# Patient Record
Sex: FEMALE | Race: WHITE | NOT HISPANIC OR LATINO
[De-identification: names, ages, dates, MRNs, and addresses within clinical notes are randomized per-mention and may not be internally consistent; named-entity substitution may affect disease eponyms.]

---

## 2017-01-05 ENCOUNTER — APPOINTMENT (OUTPATIENT)
Dept: UROGYNECOLOGY | Facility: CLINIC | Age: 82
End: 2017-01-05

## 2017-01-07 ENCOUNTER — RX RENEWAL (OUTPATIENT)
Age: 82
End: 2017-01-07

## 2017-01-10 ENCOUNTER — MEDICATION RENEWAL (OUTPATIENT)
Age: 82
End: 2017-01-10

## 2017-03-01 ENCOUNTER — APPOINTMENT (OUTPATIENT)
Dept: CARDIOLOGY | Facility: CLINIC | Age: 82
End: 2017-03-01

## 2017-03-01 ENCOUNTER — RX RENEWAL (OUTPATIENT)
Age: 82
End: 2017-03-01

## 2017-03-01 ENCOUNTER — APPOINTMENT (OUTPATIENT)
Dept: INTERNAL MEDICINE | Facility: CLINIC | Age: 82
End: 2017-03-01

## 2017-03-01 ENCOUNTER — NON-APPOINTMENT (OUTPATIENT)
Age: 82
End: 2017-03-01

## 2017-03-01 VITALS
SYSTOLIC BLOOD PRESSURE: 148 MMHG | BODY MASS INDEX: 24.6 KG/M2 | DIASTOLIC BLOOD PRESSURE: 78 MMHG | HEIGHT: 59 IN | OXYGEN SATURATION: 96 % | WEIGHT: 122 LBS | HEART RATE: 75 BPM | TEMPERATURE: 97.9 F | RESPIRATION RATE: 12 BRPM

## 2017-03-01 RX ORDER — PNEUMOCOCCAL 23-VAL P-SAC VAC 25MCG/0.5
25 VIAL (ML) INJECTION
Qty: 1 | Refills: 0 | Status: ACTIVE | COMMUNITY
Start: 2016-10-25

## 2017-03-28 ENCOUNTER — MEDICATION RENEWAL (OUTPATIENT)
Age: 82
End: 2017-03-28

## 2017-03-28 ENCOUNTER — RX RENEWAL (OUTPATIENT)
Age: 82
End: 2017-03-28

## 2017-04-03 ENCOUNTER — APPOINTMENT (OUTPATIENT)
Dept: UROGYNECOLOGY | Facility: CLINIC | Age: 82
End: 2017-04-03

## 2017-04-10 ENCOUNTER — RX RENEWAL (OUTPATIENT)
Age: 82
End: 2017-04-10

## 2017-04-27 ENCOUNTER — RX RENEWAL (OUTPATIENT)
Age: 82
End: 2017-04-27

## 2017-06-08 ENCOUNTER — RX RENEWAL (OUTPATIENT)
Age: 82
End: 2017-06-08

## 2017-06-12 ENCOUNTER — APPOINTMENT (OUTPATIENT)
Dept: UROLOGY | Facility: CLINIC | Age: 82
End: 2017-06-12

## 2017-06-12 ENCOUNTER — LABORATORY RESULT (OUTPATIENT)
Age: 82
End: 2017-06-12

## 2017-06-12 ENCOUNTER — APPOINTMENT (OUTPATIENT)
Dept: PULMONOLOGY | Facility: CLINIC | Age: 82
End: 2017-06-12

## 2017-06-12 VITALS
WEIGHT: 122 LBS | DIASTOLIC BLOOD PRESSURE: 77 MMHG | SYSTOLIC BLOOD PRESSURE: 148 MMHG | RESPIRATION RATE: 12 BRPM | OXYGEN SATURATION: 98 % | HEART RATE: 62 BPM | HEIGHT: 59 IN | BODY MASS INDEX: 24.6 KG/M2

## 2017-06-12 DIAGNOSIS — H35.30 UNSPECIFIED MACULAR DEGENERATION: ICD-10-CM

## 2017-06-12 DIAGNOSIS — H02.401 UNSPECIFIED PTOSIS OF RIGHT EYELID: ICD-10-CM

## 2017-06-13 LAB
ALBUMIN SERPL ELPH-MCNC: 4.1 G/DL
ALP BLD-CCNC: 48 U/L
ALT SERPL-CCNC: 17 U/L
ANION GAP SERPL CALC-SCNC: 18 MMOL/L
AST SERPL-CCNC: 29 U/L
BASOPHILS # BLD AUTO: 0.05 K/UL
BASOPHILS NFR BLD AUTO: 0.7 %
BILIRUB SERPL-MCNC: 0.4 MG/DL
BUN SERPL-MCNC: 14 MG/DL
CALCIUM SERPL-MCNC: 9.4 MG/DL
CHLORIDE SERPL-SCNC: 102 MMOL/L
CHOLEST SERPL-MCNC: 164 MG/DL
CHOLEST/HDLC SERPL: 3.4 RATIO
CO2 SERPL-SCNC: 22 MMOL/L
CREAT SERPL-MCNC: 0.72 MG/DL
CRP SERPL-MCNC: <0.2 MG/DL
EOSINOPHIL # BLD AUTO: 0.15 K/UL
EOSINOPHIL NFR BLD AUTO: 2 %
ERYTHROCYTE [SEDIMENTATION RATE] IN BLOOD BY WESTERGREN METHOD: 17 MM/HR
GLUCOSE SERPL-MCNC: 52 MG/DL
HBA1C MFR BLD HPLC: 5.4 %
HCT VFR BLD CALC: 35.4 %
HDLC SERPL-MCNC: 48 MG/DL
HGB BLD-MCNC: 11 G/DL
IMM GRANULOCYTES NFR BLD AUTO: 0.4 %
LDLC SERPL CALC-MCNC: 76 MG/DL
LYMPHOCYTES # BLD AUTO: 1.87 K/UL
LYMPHOCYTES NFR BLD AUTO: 24.7 %
MAN DIFF?: NORMAL
MCHC RBC-ENTMCNC: 18.9 PG
MCHC RBC-ENTMCNC: 31.1 GM/DL
MCV RBC AUTO: 60.7 FL
MONOCYTES # BLD AUTO: 0.37 K/UL
MONOCYTES NFR BLD AUTO: 4.9 %
NEUTROPHILS # BLD AUTO: 5.1 K/UL
NEUTROPHILS NFR BLD AUTO: 67.3 %
PLATELET # BLD AUTO: 183 K/UL
POTASSIUM SERPL-SCNC: 4.2 MMOL/L
PROT SERPL-MCNC: 7.7 G/DL
RBC # BLD: 5.83 M/UL
RBC # FLD: 17 %
SODIUM SERPL-SCNC: 142 MMOL/L
T4 SERPL-MCNC: 8.4 UG/DL
TRIGL SERPL-MCNC: 199 MG/DL
TSH SERPL-ACNC: 2.24 UIU/ML
WBC # FLD AUTO: 7.57 K/UL

## 2017-07-10 ENCOUNTER — APPOINTMENT (OUTPATIENT)
Dept: UROGYNECOLOGY | Facility: CLINIC | Age: 82
End: 2017-07-10

## 2017-07-10 RX ORDER — AZITHROMYCIN 250 MG/1
250 TABLET, FILM COATED ORAL
Qty: 1 | Refills: 0 | Status: DISCONTINUED | COMMUNITY
Start: 2017-03-01 | End: 2017-07-10

## 2017-07-20 ENCOUNTER — NON-APPOINTMENT (OUTPATIENT)
Age: 82
End: 2017-07-20

## 2017-07-20 ENCOUNTER — APPOINTMENT (OUTPATIENT)
Dept: CARDIOLOGY | Facility: CLINIC | Age: 82
End: 2017-07-20

## 2017-07-20 ENCOUNTER — APPOINTMENT (OUTPATIENT)
Dept: PULMONOLOGY | Facility: CLINIC | Age: 82
End: 2017-07-20

## 2017-07-20 ENCOUNTER — LABORATORY RESULT (OUTPATIENT)
Age: 82
End: 2017-07-20

## 2017-07-20 VITALS
RESPIRATION RATE: 12 BRPM | OXYGEN SATURATION: 94 % | HEART RATE: 79 BPM | SYSTOLIC BLOOD PRESSURE: 133 MMHG | TEMPERATURE: 98.3 F | HEIGHT: 59 IN | DIASTOLIC BLOOD PRESSURE: 71 MMHG

## 2017-07-20 DIAGNOSIS — R19.7 DIARRHEA, UNSPECIFIED: ICD-10-CM

## 2017-07-20 RX ORDER — FLUTICASONE PROPIONATE 50 UG/1
50 SPRAY, METERED NASAL
Qty: 16 | Refills: 0 | Status: DISCONTINUED | COMMUNITY
Start: 2017-03-01 | End: 2017-07-20

## 2017-07-21 LAB
ALBUMIN SERPL ELPH-MCNC: 4.5 G/DL
ALP BLD-CCNC: 51 U/L
ALT SERPL-CCNC: 19 U/L
ANION GAP SERPL CALC-SCNC: 19 MMOL/L
APPEARANCE: ABNORMAL
AST SERPL-CCNC: 28 U/L
BACTERIA: ABNORMAL
BASOPHILS # BLD AUTO: 0.3 K/UL
BASOPHILS NFR BLD AUTO: 2.8 %
BILIRUB SERPL-MCNC: 0.6 MG/DL
BILIRUBIN URINE: ABNORMAL
BLOOD URINE: ABNORMAL
BUN SERPL-MCNC: 20 MG/DL
CALCIUM SERPL-MCNC: 9.5 MG/DL
CHLORIDE SERPL-SCNC: 105 MMOL/L
CHOLEST SERPL-MCNC: 165 MG/DL
CHOLEST/HDLC SERPL: 3.4 RATIO
CO2 SERPL-SCNC: 22 MMOL/L
COLOR: ABNORMAL
CREAT SERPL-MCNC: 0.78 MG/DL
EOSINOPHIL # BLD AUTO: 0.1 K/UL
EOSINOPHIL NFR BLD AUTO: 0.9 %
GLUCOSE QUALITATIVE U: NORMAL MG/DL
GLUCOSE SERPL-MCNC: 47 MG/DL
HBA1C MFR BLD HPLC: 5.3 %
HCT VFR BLD CALC: 36.8 %
HDLC SERPL-MCNC: 49 MG/DL
HGB BLD-MCNC: 11.6 G/DL
HYALINE CASTS: 0 /LPF
KETONES URINE: NEGATIVE
LDLC SERPL CALC-MCNC: 83 MG/DL
LEUKOCYTE ESTERASE URINE: ABNORMAL
LYMPHOCYTES # BLD AUTO: 1.5 K/UL
LYMPHOCYTES NFR BLD AUTO: 14.2 %
MAN DIFF?: NORMAL
MCHC RBC-ENTMCNC: 19.2 PG
MCHC RBC-ENTMCNC: 31.5 GM/DL
MCV RBC AUTO: 61 FL
MICROSCOPIC-UA: NORMAL
MONOCYTES # BLD AUTO: 0.4 K/UL
MONOCYTES NFR BLD AUTO: 3.8 %
NEUTROPHILS # BLD AUTO: 8.08 K/UL
NEUTROPHILS NFR BLD AUTO: 76.4 %
NITRITE URINE: POSITIVE
PH URINE: 5
PLATELET # BLD AUTO: 206 K/UL
POTASSIUM SERPL-SCNC: 4.8 MMOL/L
PROT SERPL-MCNC: 7.9 G/DL
PROTEIN URINE: 30 MG/DL
RBC # BLD: 6.03 M/UL
RBC # FLD: 17.6 %
RED BLOOD CELLS URINE: 4 /HPF
SODIUM SERPL-SCNC: 146 MMOL/L
SPECIFIC GRAVITY URINE: 1.02
SQUAMOUS EPITHELIAL CELLS: 1 /HPF
T4 SERPL-MCNC: 9.4 UG/DL
TRIGL SERPL-MCNC: 167 MG/DL
TSH SERPL-ACNC: 2.42 UIU/ML
URINE COMMENTS: NORMAL
UROBILINOGEN URINE: 1 MG/DL
WBC # FLD AUTO: 10.58 K/UL
WHITE BLOOD CELLS URINE: 174 /HPF

## 2017-07-25 ENCOUNTER — RX RENEWAL (OUTPATIENT)
Age: 82
End: 2017-07-25

## 2017-07-31 ENCOUNTER — APPOINTMENT (OUTPATIENT)
Dept: OPHTHALMOLOGY | Facility: CLINIC | Age: 82
End: 2017-07-31
Payer: MEDICARE

## 2017-07-31 DIAGNOSIS — H25.9 UNSPECIFIED AGE-RELATED CATARACT: ICD-10-CM

## 2017-07-31 DIAGNOSIS — H35.372 PUCKERING OF MACULA, LEFT EYE: ICD-10-CM

## 2017-07-31 DIAGNOSIS — H35.052 RETINAL NEOVASCULARIZATION, UNSPECIFIED, LEFT EYE: ICD-10-CM

## 2017-07-31 PROCEDURE — 92015 DETERMINE REFRACTIVE STATE: CPT

## 2017-07-31 PROCEDURE — 92012 INTRM OPH EXAM EST PATIENT: CPT

## 2017-08-21 ENCOUNTER — APPOINTMENT (OUTPATIENT)
Dept: PULMONOLOGY | Facility: CLINIC | Age: 82
End: 2017-08-21
Payer: MEDICARE

## 2017-08-21 VITALS — DIASTOLIC BLOOD PRESSURE: 80 MMHG | SYSTOLIC BLOOD PRESSURE: 140 MMHG

## 2017-08-21 VITALS
SYSTOLIC BLOOD PRESSURE: 160 MMHG | HEART RATE: 79 BPM | DIASTOLIC BLOOD PRESSURE: 70 MMHG | RESPIRATION RATE: 12 BRPM | HEIGHT: 59 IN | OXYGEN SATURATION: 94 % | BODY MASS INDEX: 23.99 KG/M2 | TEMPERATURE: 98 F | WEIGHT: 119 LBS

## 2017-08-21 DIAGNOSIS — M25.521 PAIN IN RIGHT ELBOW: ICD-10-CM

## 2017-08-21 DIAGNOSIS — S43.439A SUPERIOR GLENOID LABRUM LESION OF UNSPECIFIED SHOULDER, INITIAL ENCOUNTER: ICD-10-CM

## 2017-08-21 PROCEDURE — 99214 OFFICE O/P EST MOD 30 MIN: CPT

## 2017-10-04 ENCOUNTER — MEDICATION RENEWAL (OUTPATIENT)
Age: 82
End: 2017-10-04

## 2017-10-04 ENCOUNTER — APPOINTMENT (OUTPATIENT)
Dept: PULMONOLOGY | Facility: CLINIC | Age: 82
End: 2017-10-04
Payer: MEDICARE

## 2017-10-04 VITALS
DIASTOLIC BLOOD PRESSURE: 74 MMHG | HEART RATE: 71 BPM | WEIGHT: 123 LBS | OXYGEN SATURATION: 95 % | TEMPERATURE: 98.3 F | SYSTOLIC BLOOD PRESSURE: 160 MMHG | BODY MASS INDEX: 24.8 KG/M2 | HEIGHT: 59 IN | RESPIRATION RATE: 12 BRPM

## 2017-10-04 DIAGNOSIS — R91.1 SOLITARY PULMONARY NODULE: ICD-10-CM

## 2017-10-04 PROCEDURE — 99214 OFFICE O/P EST MOD 30 MIN: CPT

## 2017-10-06 RX ORDER — LEVOFLOXACIN 500 MG/1
500 TABLET, FILM COATED ORAL
Qty: 5 | Refills: 0 | Status: DISCONTINUED | COMMUNITY
Start: 2017-10-04 | End: 2017-10-06

## 2017-10-09 ENCOUNTER — APPOINTMENT (OUTPATIENT)
Dept: UROGYNECOLOGY | Facility: CLINIC | Age: 82
End: 2017-10-09
Payer: MEDICARE

## 2017-10-09 LAB — BACTERIA UR CULT: ABNORMAL

## 2017-10-09 PROCEDURE — 99213 OFFICE O/P EST LOW 20 MIN: CPT

## 2017-10-17 ENCOUNTER — MESSAGE (OUTPATIENT)
Age: 82
End: 2017-10-17

## 2017-10-23 ENCOUNTER — RX RENEWAL (OUTPATIENT)
Age: 82
End: 2017-10-23

## 2017-11-13 ENCOUNTER — MEDICATION RENEWAL (OUTPATIENT)
Age: 82
End: 2017-11-13

## 2017-11-20 ENCOUNTER — APPOINTMENT (OUTPATIENT)
Dept: CARDIOLOGY | Facility: CLINIC | Age: 82
End: 2017-11-20
Payer: MEDICARE

## 2017-11-20 ENCOUNTER — NON-APPOINTMENT (OUTPATIENT)
Age: 82
End: 2017-11-20

## 2017-11-20 VITALS
HEART RATE: 71 BPM | HEIGHT: 59 IN | OXYGEN SATURATION: 95 % | SYSTOLIC BLOOD PRESSURE: 160 MMHG | TEMPERATURE: 98.1 F | DIASTOLIC BLOOD PRESSURE: 72 MMHG | RESPIRATION RATE: 12 BRPM

## 2017-11-20 VITALS — SYSTOLIC BLOOD PRESSURE: 140 MMHG | DIASTOLIC BLOOD PRESSURE: 70 MMHG

## 2017-11-20 PROCEDURE — 93306 TTE W/DOPPLER COMPLETE: CPT

## 2017-11-20 PROCEDURE — 99215 OFFICE O/P EST HI 40 MIN: CPT

## 2017-11-30 ENCOUNTER — APPOINTMENT (OUTPATIENT)
Dept: INTERNAL MEDICINE | Facility: CLINIC | Age: 82
End: 2017-11-30
Payer: MEDICARE

## 2017-11-30 VITALS
BODY MASS INDEX: 24.8 KG/M2 | WEIGHT: 123 LBS | SYSTOLIC BLOOD PRESSURE: 183 MMHG | RESPIRATION RATE: 12 BRPM | HEART RATE: 77 BPM | DIASTOLIC BLOOD PRESSURE: 85 MMHG | TEMPERATURE: 97.9 F | OXYGEN SATURATION: 98 % | HEIGHT: 59 IN

## 2017-11-30 DIAGNOSIS — J06.9 ACUTE UPPER RESPIRATORY INFECTION, UNSPECIFIED: ICD-10-CM

## 2017-11-30 DIAGNOSIS — J34.89 NASAL CONGESTION: ICD-10-CM

## 2017-11-30 DIAGNOSIS — R09.81 NASAL CONGESTION: ICD-10-CM

## 2017-11-30 PROCEDURE — 99214 OFFICE O/P EST MOD 30 MIN: CPT

## 2017-12-01 ENCOUNTER — RX RENEWAL (OUTPATIENT)
Age: 82
End: 2017-12-01

## 2017-12-01 RX ORDER — LEVALBUTEROL TARTRATE 45 UG/1
45 AEROSOL, METERED ORAL
Qty: 15 | Refills: 0 | Status: DISCONTINUED | COMMUNITY
Start: 2017-11-30 | End: 2017-12-01

## 2017-12-07 ENCOUNTER — APPOINTMENT (OUTPATIENT)
Dept: PULMONOLOGY | Facility: CLINIC | Age: 82
End: 2017-12-07
Payer: MEDICARE

## 2017-12-07 ENCOUNTER — RX RENEWAL (OUTPATIENT)
Age: 82
End: 2017-12-07

## 2017-12-07 VITALS
RESPIRATION RATE: 12 BRPM | HEIGHT: 59 IN | HEART RATE: 85 BPM | BODY MASS INDEX: 24.8 KG/M2 | SYSTOLIC BLOOD PRESSURE: 128 MMHG | OXYGEN SATURATION: 93 % | DIASTOLIC BLOOD PRESSURE: 69 MMHG | WEIGHT: 123 LBS | TEMPERATURE: 97.6 F

## 2017-12-07 PROCEDURE — 99214 OFFICE O/P EST MOD 30 MIN: CPT | Mod: 25

## 2017-12-07 PROCEDURE — 96372 THER/PROPH/DIAG INJ SC/IM: CPT

## 2017-12-07 PROCEDURE — 96374 THER/PROPH/DIAG INJ IV PUSH: CPT

## 2017-12-07 RX ADMIN — METHYLPREDNISOLONE SODIUM SUCCINATE MG: 40 INJECTION, POWDER, FOR SOLUTION INTRAMUSCULAR; INTRAVENOUS at 00:00

## 2017-12-13 ENCOUNTER — RX RENEWAL (OUTPATIENT)
Age: 82
End: 2017-12-13

## 2017-12-30 RX ORDER — METHYLPREDNISOLONE 40 MG/ML
40 INJECTION, POWDER, LYOPHILIZED, FOR SOLUTION INTRAMUSCULAR; INTRAVENOUS
Qty: 1 | Refills: 0 | Status: COMPLETED | OUTPATIENT
Start: 2017-12-07

## 2018-01-11 ENCOUNTER — APPOINTMENT (OUTPATIENT)
Dept: UROGYNECOLOGY | Facility: CLINIC | Age: 83
End: 2018-01-11
Payer: MEDICARE

## 2018-01-11 PROCEDURE — 99213 OFFICE O/P EST LOW 20 MIN: CPT

## 2018-01-13 ENCOUNTER — MEDICATION RENEWAL (OUTPATIENT)
Age: 83
End: 2018-01-13

## 2018-01-17 ENCOUNTER — MEDICATION RENEWAL (OUTPATIENT)
Age: 83
End: 2018-01-17

## 2018-01-19 ENCOUNTER — RX RENEWAL (OUTPATIENT)
Age: 83
End: 2018-01-19

## 2018-03-09 ENCOUNTER — RX RENEWAL (OUTPATIENT)
Age: 83
End: 2018-03-09

## 2018-04-09 ENCOUNTER — MEDICATION RENEWAL (OUTPATIENT)
Age: 83
End: 2018-04-09

## 2018-04-12 ENCOUNTER — APPOINTMENT (OUTPATIENT)
Dept: UROGYNECOLOGY | Facility: CLINIC | Age: 83
End: 2018-04-12
Payer: MEDICARE

## 2018-04-12 PROCEDURE — 99213 OFFICE O/P EST LOW 20 MIN: CPT

## 2018-04-18 ENCOUNTER — MEDICATION RENEWAL (OUTPATIENT)
Age: 83
End: 2018-04-18

## 2018-04-23 ENCOUNTER — LABORATORY RESULT (OUTPATIENT)
Age: 83
End: 2018-04-23

## 2018-04-23 ENCOUNTER — APPOINTMENT (OUTPATIENT)
Dept: PULMONOLOGY | Facility: CLINIC | Age: 83
End: 2018-04-23
Payer: MEDICARE

## 2018-04-23 ENCOUNTER — APPOINTMENT (OUTPATIENT)
Dept: CARDIOLOGY | Facility: CLINIC | Age: 83
End: 2018-04-23

## 2018-04-23 VITALS
SYSTOLIC BLOOD PRESSURE: 162 MMHG | HEART RATE: 90 BPM | BODY MASS INDEX: 24.6 KG/M2 | HEIGHT: 59 IN | RESPIRATION RATE: 12 BRPM | DIASTOLIC BLOOD PRESSURE: 76 MMHG | OXYGEN SATURATION: 96 % | WEIGHT: 122 LBS

## 2018-04-23 VITALS — SYSTOLIC BLOOD PRESSURE: 130 MMHG | DIASTOLIC BLOOD PRESSURE: 70 MMHG

## 2018-04-23 DIAGNOSIS — Z87.09 PERSONAL HISTORY OF OTHER DISEASES OF THE RESPIRATORY SYSTEM: ICD-10-CM

## 2018-04-23 PROCEDURE — 99214 OFFICE O/P EST MOD 30 MIN: CPT

## 2018-04-24 LAB
ALBUMIN SERPL ELPH-MCNC: 4.2 G/DL
ALP BLD-CCNC: 59 U/L
ALT SERPL-CCNC: 13 U/L
ANION GAP SERPL CALC-SCNC: 11 MMOL/L
AST SERPL-CCNC: 24 U/L
BASOPHILS # BLD AUTO: 0.06 K/UL
BASOPHILS NFR BLD AUTO: 0.9 %
BILIRUB SERPL-MCNC: 0.4 MG/DL
BUN SERPL-MCNC: 13 MG/DL
CALCIUM SERPL-MCNC: 9.5 MG/DL
CHLORIDE SERPL-SCNC: 103 MMOL/L
CHOLEST SERPL-MCNC: 158 MG/DL
CHOLEST/HDLC SERPL: 3.9 RATIO
CO2 SERPL-SCNC: 29 MMOL/L
CREAT SERPL-MCNC: 0.55 MG/DL
EOSINOPHIL # BLD AUTO: 0.23 K/UL
EOSINOPHIL NFR BLD AUTO: 3.4 %
GLUCOSE SERPL-MCNC: 97 MG/DL
HBA1C MFR BLD HPLC: 5.4 %
HCT VFR BLD CALC: 35.3 %
HDLC SERPL-MCNC: 41 MG/DL
HGB BLD-MCNC: 11.4 G/DL
IMM GRANULOCYTES NFR BLD AUTO: 0.3 %
LDLC SERPL CALC-MCNC: 67 MG/DL
LYMPHOCYTES # BLD AUTO: 1.64 K/UL
LYMPHOCYTES NFR BLD AUTO: 23.9 %
MAN DIFF?: NORMAL
MCHC RBC-ENTMCNC: 19.3 PG
MCHC RBC-ENTMCNC: 32.3 GM/DL
MCV RBC AUTO: 59.6 FL
MONOCYTES # BLD AUTO: 0.34 K/UL
MONOCYTES NFR BLD AUTO: 5 %
NEUTROPHILS # BLD AUTO: 4.57 K/UL
NEUTROPHILS NFR BLD AUTO: 66.5 %
PLATELET # BLD AUTO: 241 K/UL
POTASSIUM SERPL-SCNC: 4.5 MMOL/L
PROT SERPL-MCNC: 7.8 G/DL
RBC # BLD: 5.92 M/UL
RBC # FLD: 17.4 %
SODIUM SERPL-SCNC: 143 MMOL/L
TRIGL SERPL-MCNC: 249 MG/DL
TSH SERPL-ACNC: 2.33 UIU/ML
WBC # FLD AUTO: 6.86 K/UL

## 2018-04-25 ENCOUNTER — MEDICATION RENEWAL (OUTPATIENT)
Age: 83
End: 2018-04-25

## 2018-04-26 ENCOUNTER — MEDICATION RENEWAL (OUTPATIENT)
Age: 83
End: 2018-04-26

## 2018-04-26 LAB — T4 SERPL-MCNC: 9 UG/DL

## 2018-04-30 ENCOUNTER — NON-APPOINTMENT (OUTPATIENT)
Age: 83
End: 2018-04-30

## 2018-04-30 ENCOUNTER — APPOINTMENT (OUTPATIENT)
Dept: CARDIOLOGY | Facility: CLINIC | Age: 83
End: 2018-04-30
Payer: MEDICARE

## 2018-04-30 VITALS
OXYGEN SATURATION: 97 % | HEART RATE: 105 BPM | SYSTOLIC BLOOD PRESSURE: 158 MMHG | RESPIRATION RATE: 12 BRPM | HEIGHT: 59 IN | WEIGHT: 122 LBS | DIASTOLIC BLOOD PRESSURE: 75 MMHG | BODY MASS INDEX: 24.6 KG/M2

## 2018-04-30 VITALS — DIASTOLIC BLOOD PRESSURE: 70 MMHG | SYSTOLIC BLOOD PRESSURE: 138 MMHG

## 2018-04-30 PROCEDURE — 93000 ELECTROCARDIOGRAM COMPLETE: CPT

## 2018-04-30 PROCEDURE — 99214 OFFICE O/P EST MOD 30 MIN: CPT | Mod: 25

## 2018-07-10 ENCOUNTER — APPOINTMENT (OUTPATIENT)
Dept: UROGYNECOLOGY | Facility: CLINIC | Age: 83
End: 2018-07-10
Payer: MEDICARE

## 2018-07-10 DIAGNOSIS — N76.0 ACUTE VAGINITIS: ICD-10-CM

## 2018-07-10 DIAGNOSIS — B96.89 ACUTE VAGINITIS: ICD-10-CM

## 2018-07-10 PROCEDURE — 99213 OFFICE O/P EST LOW 20 MIN: CPT

## 2018-07-24 ENCOUNTER — MEDICATION RENEWAL (OUTPATIENT)
Age: 83
End: 2018-07-24

## 2018-08-20 ENCOUNTER — APPOINTMENT (OUTPATIENT)
Dept: CARDIOLOGY | Facility: CLINIC | Age: 83
End: 2018-08-20
Payer: MEDICARE

## 2018-08-20 ENCOUNTER — NON-APPOINTMENT (OUTPATIENT)
Age: 83
End: 2018-08-20

## 2018-08-20 VITALS
DIASTOLIC BLOOD PRESSURE: 71 MMHG | BODY MASS INDEX: 24.8 KG/M2 | HEIGHT: 59 IN | WEIGHT: 123 LBS | SYSTOLIC BLOOD PRESSURE: 138 MMHG | RESPIRATION RATE: 12 BRPM | HEART RATE: 75 BPM | TEMPERATURE: 98.3 F | OXYGEN SATURATION: 94 %

## 2018-08-20 PROCEDURE — 99214 OFFICE O/P EST MOD 30 MIN: CPT

## 2018-08-22 ENCOUNTER — APPOINTMENT (OUTPATIENT)
Dept: PULMONOLOGY | Facility: CLINIC | Age: 83
End: 2018-08-22
Payer: MEDICARE

## 2018-08-22 VITALS
HEIGHT: 59 IN | WEIGHT: 123 LBS | BODY MASS INDEX: 24.8 KG/M2 | TEMPERATURE: 98.1 F | HEART RATE: 77 BPM | RESPIRATION RATE: 12 BRPM | SYSTOLIC BLOOD PRESSURE: 146 MMHG | OXYGEN SATURATION: 98 % | DIASTOLIC BLOOD PRESSURE: 70 MMHG

## 2018-08-22 DIAGNOSIS — L04.0 ACUTE LYMPHADENITIS OF FACE, HEAD AND NECK: ICD-10-CM

## 2018-08-22 PROCEDURE — 99214 OFFICE O/P EST MOD 30 MIN: CPT

## 2018-09-12 ENCOUNTER — APPOINTMENT (OUTPATIENT)
Dept: PULMONOLOGY | Facility: CLINIC | Age: 83
End: 2018-09-12
Payer: MEDICARE

## 2018-09-12 VITALS
WEIGHT: 123 LBS | RESPIRATION RATE: 14 BRPM | TEMPERATURE: 98.7 F | OXYGEN SATURATION: 95 % | HEART RATE: 95 BPM | HEIGHT: 59 IN | BODY MASS INDEX: 24.8 KG/M2 | SYSTOLIC BLOOD PRESSURE: 147 MMHG | DIASTOLIC BLOOD PRESSURE: 80 MMHG

## 2018-09-12 PROCEDURE — 99214 OFFICE O/P EST MOD 30 MIN: CPT

## 2018-10-08 ENCOUNTER — APPOINTMENT (OUTPATIENT)
Dept: PULMONOLOGY | Facility: CLINIC | Age: 83
End: 2018-10-08
Payer: MEDICARE

## 2018-10-08 ENCOUNTER — APPOINTMENT (OUTPATIENT)
Dept: UROGYNECOLOGY | Facility: CLINIC | Age: 83
End: 2018-10-08
Payer: MEDICARE

## 2018-10-08 ENCOUNTER — LABORATORY RESULT (OUTPATIENT)
Age: 83
End: 2018-10-08

## 2018-10-08 VITALS
WEIGHT: 122 LBS | HEIGHT: 59 IN | TEMPERATURE: 98.2 F | HEART RATE: 80 BPM | DIASTOLIC BLOOD PRESSURE: 70 MMHG | RESPIRATION RATE: 12 BRPM | OXYGEN SATURATION: 95 % | SYSTOLIC BLOOD PRESSURE: 146 MMHG | BODY MASS INDEX: 24.6 KG/M2

## 2018-10-08 DIAGNOSIS — K81.0 ACUTE CHOLECYSTITIS: ICD-10-CM

## 2018-10-08 PROCEDURE — 99213 OFFICE O/P EST LOW 20 MIN: CPT

## 2018-10-08 PROCEDURE — 99214 OFFICE O/P EST MOD 30 MIN: CPT

## 2018-10-09 LAB
ALBUMIN SERPL ELPH-MCNC: 4.3 G/DL
ALP BLD-CCNC: 62 U/L
ALT SERPL-CCNC: 16 U/L
ANION GAP SERPL CALC-SCNC: 12 MMOL/L
AST SERPL-CCNC: 22 U/L
BASOPHILS # BLD AUTO: 0.08 K/UL
BASOPHILS NFR BLD AUTO: 0.9 %
BILIRUB SERPL-MCNC: 0.4 MG/DL
BUN SERPL-MCNC: 16 MG/DL
CALCIUM SERPL-MCNC: 10.2 MG/DL
CHLORIDE SERPL-SCNC: 107 MMOL/L
CO2 SERPL-SCNC: 25 MMOL/L
CREAT SERPL-MCNC: 0.69 MG/DL
EOSINOPHIL # BLD AUTO: 0.22 K/UL
EOSINOPHIL NFR BLD AUTO: 2.6 %
GLUCOSE SERPL-MCNC: 126 MG/DL
HCT VFR BLD CALC: 35.7 %
HGB BLD-MCNC: 11.2 G/DL
LYMPHOCYTES # BLD AUTO: 1.32 K/UL
LYMPHOCYTES NFR BLD AUTO: 15.8 %
MAN DIFF?: NORMAL
MCHC RBC-ENTMCNC: 18.9 PG
MCHC RBC-ENTMCNC: 31.4 GM/DL
MCV RBC AUTO: 60.3 FL
MONOCYTES # BLD AUTO: 0.37 K/UL
MONOCYTES NFR BLD AUTO: 4.4 %
NEUTROPHILS # BLD AUTO: 6.17 K/UL
NEUTROPHILS NFR BLD AUTO: 73.7 %
PLATELET # BLD AUTO: 213 K/UL
POTASSIUM SERPL-SCNC: 4.2 MMOL/L
PROT SERPL-MCNC: 7.5 G/DL
RBC # BLD: 5.92 M/UL
RBC # FLD: 17 %
SODIUM SERPL-SCNC: 144 MMOL/L
WBC # FLD AUTO: 8.37 K/UL

## 2018-10-22 ENCOUNTER — MEDICATION RENEWAL (OUTPATIENT)
Age: 83
End: 2018-10-22

## 2018-10-29 ENCOUNTER — RX RENEWAL (OUTPATIENT)
Age: 83
End: 2018-10-29

## 2018-11-07 LAB — ACID FAST STN SPT: NORMAL

## 2018-11-28 ENCOUNTER — MEDICATION RENEWAL (OUTPATIENT)
Age: 83
End: 2018-11-28

## 2018-12-12 ENCOUNTER — APPOINTMENT (OUTPATIENT)
Dept: UROGYNECOLOGY | Facility: CLINIC | Age: 83
End: 2018-12-12
Payer: MEDICARE

## 2018-12-12 ENCOUNTER — MESSAGE (OUTPATIENT)
Age: 83
End: 2018-12-12

## 2018-12-12 PROCEDURE — 99213 OFFICE O/P EST LOW 20 MIN: CPT

## 2018-12-20 ENCOUNTER — APPOINTMENT (OUTPATIENT)
Dept: CARDIOLOGY | Facility: CLINIC | Age: 83
End: 2018-12-20

## 2018-12-20 ENCOUNTER — APPOINTMENT (OUTPATIENT)
Dept: PULMONOLOGY | Facility: CLINIC | Age: 83
End: 2018-12-20

## 2018-12-28 ENCOUNTER — APPOINTMENT (OUTPATIENT)
Dept: UROGYNECOLOGY | Facility: CLINIC | Age: 83
End: 2018-12-28
Payer: MEDICARE

## 2018-12-28 PROCEDURE — 99213 OFFICE O/P EST LOW 20 MIN: CPT

## 2018-12-28 RX ORDER — AZITHROMYCIN 250 MG/1
250 TABLET, FILM COATED ORAL
Qty: 6 | Refills: 0 | Status: DISCONTINUED | COMMUNITY
Start: 2018-04-23 | End: 2018-12-28

## 2018-12-28 RX ORDER — OMEPRAZOLE 40 MG/1
40 CAPSULE, DELAYED RELEASE ORAL
Qty: 90 | Refills: 1 | Status: DISCONTINUED | COMMUNITY
Start: 2017-11-30 | End: 2018-12-28

## 2018-12-28 RX ORDER — SULFAMETHOXAZOLE AND TRIMETHOPRIM 800; 160 MG/1; MG/1
800-160 TABLET ORAL TWICE DAILY
Qty: 20 | Refills: 0 | Status: DISCONTINUED | COMMUNITY
Start: 2017-07-20 | End: 2018-12-28

## 2018-12-28 RX ORDER — PHENAZOPYRIDINE HYDROCHLORIDE 100 MG/1
100 TABLET ORAL
Qty: 30 | Refills: 1 | Status: DISCONTINUED | COMMUNITY
Start: 2017-10-06 | End: 2018-12-28

## 2018-12-28 RX ORDER — AMOXICILLIN AND CLAVULANATE POTASSIUM 500; 125 MG/1; MG/1
500-125 TABLET, FILM COATED ORAL
Qty: 14 | Refills: 0 | Status: DISCONTINUED | COMMUNITY
Start: 2018-08-22 | End: 2018-12-28

## 2019-01-03 ENCOUNTER — RX RENEWAL (OUTPATIENT)
Age: 84
End: 2019-01-03

## 2019-01-07 ENCOUNTER — APPOINTMENT (OUTPATIENT)
Dept: UROGYNECOLOGY | Facility: CLINIC | Age: 84
End: 2019-01-07
Payer: MEDICARE

## 2019-01-07 PROCEDURE — 99213 OFFICE O/P EST LOW 20 MIN: CPT

## 2019-01-07 NOTE — DISCUSSION/SUMMARY
[FreeTextEntry1] : Pt doing well with pessary.  She will RTO in 3 months or sooner if needed. Pt agrees to call office with any problems/concerns.

## 2019-01-07 NOTE — HISTORY OF PRESENT ILLNESS
[FreeTextEntry1] : Pt presents to office for f/u of prolapse.  Fitted with a smaller pessary 2 weeks ago.  Denies any issues with pessary.

## 2019-01-07 NOTE — PROCEDURE
[Good Fit] : fits well [Pessary Washed] : washed [H2O] : H2O [None] : no bleeding [Refit] : refit is not needed [Erosion] : no evidence of erosion [Erythema] : no erythema [Discharge] : no vaginal discharge [Infection] : no evidence of infection [FreeTextEntry1] : Ring with support #1 [FreeTextEntry3] : continue vaginal estrogen [FreeTextEntry8] : routine maximilian-care

## 2019-01-07 NOTE — PHYSICAL EXAM
[No Acute Distress] : in no acute distress [Well developed] : well developed [Well Nourished] : ~L well nourished [Good Hygeine] : demonstrates good hygeine [Oriented x3] : oriented to person, place, and time [Normal Memory] : ~T memory was ~L unimpaired [Normal Mood/Affect] : mood and affect are normal [Warm and Dry] : was warm and dry to touch [Normal Gait] : gait was normal [Labia Majora] : were normal [Labia Minora] : were normal [Normal Appearance] : general appearance was normal [Atrophy] : atrophy [No Bleeding] : there was no active vaginal bleeding [Normal] : normal [Anxiety] : patient is not anxious [Tenderness] : ~T no ~M abdominal tenderness observed [Distended] : not distended

## 2019-01-10 ENCOUNTER — MEDICATION RENEWAL (OUTPATIENT)
Age: 84
End: 2019-01-10

## 2019-01-14 ENCOUNTER — APPOINTMENT (OUTPATIENT)
Dept: UROGYNECOLOGY | Facility: CLINIC | Age: 84
End: 2019-01-14

## 2019-01-16 ENCOUNTER — APPOINTMENT (OUTPATIENT)
Dept: PULMONOLOGY | Facility: CLINIC | Age: 84
End: 2019-01-16
Payer: MEDICARE

## 2019-01-16 ENCOUNTER — LABORATORY RESULT (OUTPATIENT)
Age: 84
End: 2019-01-16

## 2019-01-16 VITALS
SYSTOLIC BLOOD PRESSURE: 168 MMHG | RESPIRATION RATE: 12 BRPM | DIASTOLIC BLOOD PRESSURE: 75 MMHG | OXYGEN SATURATION: 97 % | WEIGHT: 122 LBS | HEART RATE: 81 BPM | BODY MASS INDEX: 24.6 KG/M2 | TEMPERATURE: 98 F | HEIGHT: 59 IN

## 2019-01-16 LAB
ALBUMIN SERPL ELPH-MCNC: 4.5 G/DL
ALP BLD-CCNC: 51 U/L
ALT SERPL-CCNC: 20 U/L
ANION GAP SERPL CALC-SCNC: 10 MMOL/L
APPEARANCE: CLEAR
AST SERPL-CCNC: 29 U/L
BACTERIA: NEGATIVE
BILIRUB SERPL-MCNC: 0.4 MG/DL
BILIRUBIN URINE: NEGATIVE
BLOOD URINE: NEGATIVE
BUN SERPL-MCNC: 13 MG/DL
CALCIUM SERPL-MCNC: 9.9 MG/DL
CHLORIDE SERPL-SCNC: 104 MMOL/L
CHOLEST SERPL-MCNC: 163 MG/DL
CHOLEST/HDLC SERPL: 2.9 RATIO
CO2 SERPL-SCNC: 26 MMOL/L
COLOR: YELLOW
CREAT SERPL-MCNC: 0.68 MG/DL
GLUCOSE QUALITATIVE U: NEGATIVE MG/DL
GLUCOSE SERPL-MCNC: 87 MG/DL
HDLC SERPL-MCNC: 56 MG/DL
KETONES URINE: NEGATIVE
LDLC SERPL CALC-MCNC: 87 MG/DL
LEUKOCYTE ESTERASE URINE: NEGATIVE
MICROSCOPIC-UA: NORMAL
NITRITE URINE: NEGATIVE
PH URINE: 7.5
POTASSIUM SERPL-SCNC: 4.1 MMOL/L
PROT SERPL-MCNC: 7.8 G/DL
PROTEIN URINE: NEGATIVE MG/DL
RED BLOOD CELLS URINE: 1 /HPF
SODIUM SERPL-SCNC: 140 MMOL/L
SPECIFIC GRAVITY URINE: 1.01
SQUAMOUS EPITHELIAL CELLS: 1 /HPF
TRIGL SERPL-MCNC: 102 MG/DL
UROBILINOGEN URINE: NEGATIVE MG/DL
WHITE BLOOD CELLS URINE: 3 /HPF

## 2019-01-16 PROCEDURE — 99214 OFFICE O/P EST MOD 30 MIN: CPT

## 2019-01-17 ENCOUNTER — APPOINTMENT (OUTPATIENT)
Dept: CARDIOLOGY | Facility: CLINIC | Age: 84
End: 2019-01-17
Payer: MEDICARE

## 2019-01-17 ENCOUNTER — NON-APPOINTMENT (OUTPATIENT)
Age: 84
End: 2019-01-17

## 2019-01-17 VITALS
BODY MASS INDEX: 24.6 KG/M2 | DIASTOLIC BLOOD PRESSURE: 70 MMHG | SYSTOLIC BLOOD PRESSURE: 166 MMHG | TEMPERATURE: 98.4 F | HEART RATE: 70 BPM | OXYGEN SATURATION: 97 % | WEIGHT: 122 LBS | RESPIRATION RATE: 12 BRPM | HEIGHT: 59 IN

## 2019-01-17 VITALS — DIASTOLIC BLOOD PRESSURE: 70 MMHG | SYSTOLIC BLOOD PRESSURE: 154 MMHG

## 2019-01-17 PROCEDURE — 93306 TTE W/DOPPLER COMPLETE: CPT

## 2019-01-17 PROCEDURE — 99214 OFFICE O/P EST MOD 30 MIN: CPT | Mod: 25

## 2019-01-17 NOTE — REVIEW OF SYSTEMS
[see HPI] : see HPI [Negative] : Heme/Lymph [Shortness Of Breath] : no shortness of breath [Dyspnea on exertion] : not dyspnea during exertion [Chest Pain] : no chest pain [Lower Ext Edema] : no extremity edema [Palpitations] : no palpitations

## 2019-01-17 NOTE — HISTORY OF PRESENT ILLNESS
[FreeTextEntry1] : Manasa is getting ready to go to [place] for the winter. She walks daily with no chest pain, palpitations or shortness of breath.

## 2019-01-17 NOTE — PHYSICAL EXAM
[No Deformities] : no deformities [Normal Conjunctiva] : the conjunctiva exhibited no abnormalities [Eyelids - No Xanthelasma] : the eyelids demonstrated no xanthelasmas [Normal Oral Mucosa] : normal oral mucosa [No Oral Pallor] : no oral pallor [No Oral Cyanosis] : no oral cyanosis [Normal Jugular Venous A Waves Present] : normal jugular venous A waves present [Normal Jugular Venous V Waves Present] : normal jugular venous V waves present [No Jugular Venous Romano A Waves] : no jugular venous romano A waves [Respiration, Rhythm And Depth] : normal respiratory rhythm and effort [Exaggerated Use Of Accessory Muscles For Inspiration] : no accessory muscle use [Auscultation Breath Sounds / Voice Sounds] : lungs were clear to auscultation bilaterally [Heart Rate And Rhythm] : heart rate and rhythm were normal [Heart Sounds] : normal S1 and S2 [Systolic grade ___/6] : A grade [unfilled]/6 systolic murmur was heard. [Abdomen Soft] : soft [Abdomen Tenderness] : non-tender [Abdomen Mass (___ Cm)] : no abdominal mass palpated [Abnormal Walk] : normal gait [Gait - Sufficient For Exercise Testing] : the gait was sufficient for exercise testing [Nail Clubbing] : no clubbing of the fingernails [Cyanosis, Localized] : no localized cyanosis [Petechial Hemorrhages (___cm)] : no petechial hemorrhages [Skin Color & Pigmentation] : normal skin color and pigmentation [] : no rash [No Venous Stasis] : no venous stasis [Skin Lesions] : no skin lesions [No Skin Ulcers] : no skin ulcer [No Xanthoma] : no  xanthoma was observed [Oriented To Time, Place, And Person] : oriented to person, place, and time [Affect] : the affect was normal [Mood] : the mood was normal [No Anxiety] : not feeling anxious

## 2019-01-17 NOTE — REASON FOR VISIT
[Follow-Up - Clinic] : a clinic follow-up of [Aortic Stenosis] : aortic stenosis [Hyperlipidemia] : hyperlipidemia [Hypertension] : hypertension

## 2019-01-17 NOTE — DISCUSSION/SUMMARY
[___ Month(s)] : [unfilled] month(s) [FreeTextEntry1] : The patient is an 85-year-old female history of hypertension, hyperlipidemia, hypothyroidism, esophageal reflux, mitral stenosis and aortic stenosis, who is mildly hypertensive. \par #1 Htn- dietary modifications, on losartan, amlodipine , currently off metoprolol\par #2 MS/AS- no change in exam, ECHO preliminary unchanged\par #3 Lipids- lipid panel\par #4 Hypothyroid- levothyroxine\par #5 General- We discussed adherence to a low fat, low cholesterol diet and regular daily exercise.\par

## 2019-01-22 LAB
BASOPHILS # BLD AUTO: 0.03 K/UL
BASOPHILS NFR BLD AUTO: 0.4 %
EOSINOPHIL # BLD AUTO: 0.15 K/UL
EOSINOPHIL NFR BLD AUTO: 2.1 %
HBA1C MFR BLD HPLC: 5.4 %
HCT VFR BLD CALC: 36.3 %
HGB BLD-MCNC: 11.5 G/DL
IMM GRANULOCYTES NFR BLD AUTO: 0.4 %
LYMPHOCYTES # BLD AUTO: 1.74 K/UL
LYMPHOCYTES NFR BLD AUTO: 24.2 %
MAN DIFF?: NORMAL
MCHC RBC-ENTMCNC: 19 PG
MCHC RBC-ENTMCNC: 31.7 GM/DL
MCV RBC AUTO: 59.9 FL
MONOCYTES # BLD AUTO: 0.4 K/UL
MONOCYTES NFR BLD AUTO: 5.6 %
NEUTROPHILS # BLD AUTO: 4.83 K/UL
NEUTROPHILS NFR BLD AUTO: 67.3 %
PLATELET # BLD AUTO: 225 K/UL
RBC # BLD: 6.06 M/UL
RBC # FLD: 16.8 %
T4 SERPL-MCNC: 9.6 UG/DL
TSH SERPL-ACNC: 3.74 UIU/ML
WBC # FLD AUTO: 7.18 K/UL

## 2019-04-08 ENCOUNTER — APPOINTMENT (OUTPATIENT)
Dept: UROGYNECOLOGY | Facility: CLINIC | Age: 84
End: 2019-04-08
Payer: MEDICARE

## 2019-04-08 LAB
BILIRUB UR QL STRIP: NORMAL
CLARITY UR: CLEAR
COLLECTION METHOD: NORMAL
GLUCOSE UR-MCNC: NORMAL
HCG UR QL: 0.2 EU/DL
HGB UR QL STRIP.AUTO: NORMAL
KETONES UR-MCNC: NORMAL
LEUKOCYTE ESTERASE UR QL STRIP: NORMAL
NITRITE UR QL STRIP: NORMAL
PH UR STRIP: 7
PROT UR STRIP-MCNC: NORMAL
SP GR UR STRIP: 1.01

## 2019-04-08 PROCEDURE — 51701 INSERT BLADDER CATHETER: CPT

## 2019-04-08 PROCEDURE — 99214 OFFICE O/P EST MOD 30 MIN: CPT | Mod: 25

## 2019-04-08 NOTE — PROCEDURE
[Straight Catheterization] : insertion of a straight catheter [___ Fr Straight Tip] : a [unfilled] in [place] straight tip catheter [Clear] : clear [No Complications] : no complications [Tolerated Well] : the patient tolerated the procedure well [Good Fit] : fits well [Pessary Washed] : washed [H2O] : H2O [None] : no bleeding [Refit] : refit is not needed [Erosion] : no evidence of erosion [Erythema] : no erythema [Discharge] : no vaginal discharge [Infection] : no evidence of infection [de-identified] : urethra clear, catheter easily passed. [FreeTextEntry1] : ring with support #1 [FreeTextEntry3] : vaginal estrogen [FreeTextEntry8] : routine maximilian-care

## 2019-04-08 NOTE — DISCUSSION/SUMMARY
[FreeTextEntry1] : In office UA negative. Pt advised to increase hydration with water. She reports drinking 6 oz of tomato juice today and no other liquids.   Otherwise she is doing well with pessary. She will RTO in 3 months or sooner if needed. Advised to continue vaginal estrogen and amitriptyline.  She will RTO with any UTI symptoms.

## 2019-04-08 NOTE — HISTORY OF PRESENT ILLNESS
[FreeTextEntry1] : Pt presents to office for f/u of prolapse. Denies any issues with pessary.  Reports s/o urgency every 30 minutes that began this morning.  Denies dysuria, frequency , low back pain or blood in urine.  She also reports 2 separate occasions (February and march) where she had suprapubic pain. She was in [place] at the time. She went to urgent care and was told that the in office UA indicated likely UTI, but cultures were not sent to her knowledge.   She was treated with Bactrim DS both times.  \par \par She is currently taking low dose vaginal estrogen as directed, and amitriptyline for nocturia. She is doing well on both medications.

## 2019-04-11 ENCOUNTER — APPOINTMENT (OUTPATIENT)
Dept: PULMONOLOGY | Facility: CLINIC | Age: 84
End: 2019-04-11
Payer: MEDICARE

## 2019-04-11 ENCOUNTER — LABORATORY RESULT (OUTPATIENT)
Age: 84
End: 2019-04-11

## 2019-04-11 VITALS
OXYGEN SATURATION: 97 % | RESPIRATION RATE: 12 BRPM | HEART RATE: 77 BPM | HEIGHT: 59 IN | BODY MASS INDEX: 24.6 KG/M2 | WEIGHT: 122 LBS | TEMPERATURE: 98.2 F | DIASTOLIC BLOOD PRESSURE: 71 MMHG | SYSTOLIC BLOOD PRESSURE: 166 MMHG

## 2019-04-11 DIAGNOSIS — Z87.09 PERSONAL HISTORY OF OTHER DISEASES OF THE RESPIRATORY SYSTEM: ICD-10-CM

## 2019-04-11 PROCEDURE — 99214 OFFICE O/P EST MOD 30 MIN: CPT | Mod: 25

## 2019-04-11 PROCEDURE — 94729 DIFFUSING CAPACITY: CPT

## 2019-04-11 PROCEDURE — 94726 PLETHYSMOGRAPHY LUNG VOLUMES: CPT

## 2019-04-11 PROCEDURE — 94060 EVALUATION OF WHEEZING: CPT

## 2019-04-11 NOTE — HISTORY OF PRESENT ILLNESS
[FreeTextEntry1] : Patient is a 86-year-old female with past medical history significant for gastroesophageal reflux disorder, Aortic stenosis, anxiety who presents for an acute visit .Patient presents complaining of increasing cough and shortness of breath for the last 3-4 days. She also complains of occasional fever she denies any chest pain abdominal pain nausea vomiting or diarrhea

## 2019-04-11 NOTE — PHYSICAL EXAM
[Normal Appearance] : normal appearance [General Appearance - Well Developed] : well developed [Well Groomed] : well groomed [General Appearance - Well Nourished] : well nourished [General Appearance - In No Acute Distress] : no acute distress [No Deformities] : no deformities [Eyelids - No Xanthelasma] : the eyelids demonstrated no xanthelasmas [Normal Conjunctiva] : the conjunctiva exhibited no abnormalities [Normal Oropharynx] : normal oropharynx [II] : II [Neck Appearance] : the appearance of the neck was normal [Heart Rate And Rhythm] : heart rate and rhythm were normal [Jugular Venous Distention Increased] : there was no jugular-venous distention [Heart Sounds] : normal S1 and S2 [Exaggerated Use Of Accessory Muscles For Inspiration] : no accessory muscle use [Diffuse Wheezing] : diffuse wheezing [Bowel Sounds] : normal bowel sounds [Kyphosis] : kyphosis [Abdomen Soft] : soft [Nail Clubbing] : no clubbing of the fingernails [Cyanosis, Localized] : no localized cyanosis [Abnormal Walk] : normal gait [No Focal Deficits] : no focal deficits [Oriented To Time, Place, And Person] : oriented to person, place, and time [] : no rash [Impaired Insight] : insight and judgment were intact [Affect] : the affect was normal [Mood] : the mood was normal

## 2019-04-11 NOTE — REASON FOR VISIT
[Acute] : an acute visit [COPD] : COPD [Cough] : cough [Shortness of Breath] : shortness of Breath [Wheezing] : wheezing

## 2019-04-11 NOTE — ASSESSMENT
[FreeTextEntry1] : In summary the patient is an 86-year-old female past medical history significant for anxiety, hypothyroidism who presents with signs and symptoms of acute bronchitis. The patient underwent a pulmonary function test which revealed normal lung volumes.\par \par Patient is started on Zithromax and is instructed to followup in 2 weeks

## 2019-04-11 NOTE — REVIEW OF SYSTEMS
[Fever] : no fever [Chills] : no chills [Poor Appetite] : poor appetite [Fatigue] : fatigue [Cough] : cough [Dyspnea] : dyspnea [Hemoptysis] : no hemoptysis [Sputum] : not coughing up ~M sputum [Chest Tightness] : chest tightness [Frequent URIs] : no frequent upper respiratory infections [Pleuritic Pain] : pleuritic pain [Wheezing] : wheezing [Negative] : Sleep Disorder

## 2019-04-12 LAB
ALBUMIN SERPL ELPH-MCNC: 4.4 G/DL
ALP BLD-CCNC: 64 U/L
ALT SERPL-CCNC: 17 U/L
ANION GAP SERPL CALC-SCNC: 15 MMOL/L
AST SERPL-CCNC: 24 U/L
BASOPHILS # BLD AUTO: 0.05 K/UL
BASOPHILS NFR BLD AUTO: 0.6 %
BILIRUB SERPL-MCNC: 0.4 MG/DL
BUN SERPL-MCNC: 18 MG/DL
CALCIUM SERPL-MCNC: 9.4 MG/DL
CHLORIDE SERPL-SCNC: 105 MMOL/L
CHOLEST SERPL-MCNC: 154 MG/DL
CHOLEST/HDLC SERPL: 3.1 RATIO
CO2 SERPL-SCNC: 23 MMOL/L
CREAT SERPL-MCNC: 0.59 MG/DL
EOSINOPHIL # BLD AUTO: 0.2 K/UL
EOSINOPHIL NFR BLD AUTO: 2.2 %
ESTIMATED AVERAGE GLUCOSE: 105 MG/DL
GLUCOSE SERPL-MCNC: 75 MG/DL
HBA1C MFR BLD HPLC: 5.3 %
HCT VFR BLD CALC: 37.4 %
HDLC SERPL-MCNC: 49 MG/DL
HGB BLD-MCNC: 11.4 G/DL
IMM GRANULOCYTES NFR BLD AUTO: 0.3 %
IRON SATN MFR SERPL: 20 %
IRON SERPL-MCNC: 61 UG/DL
LDLC SERPL CALC-MCNC: 71 MG/DL
LYMPHOCYTES # BLD AUTO: 2.18 K/UL
LYMPHOCYTES NFR BLD AUTO: 24.2 %
MAN DIFF?: NORMAL
MCHC RBC-ENTMCNC: 18.7 PG
MCHC RBC-ENTMCNC: 30.5 GM/DL
MCV RBC AUTO: 61.4 FL
MONOCYTES # BLD AUTO: 0.63 K/UL
MONOCYTES NFR BLD AUTO: 7 %
NEUTROPHILS # BLD AUTO: 5.91 K/UL
NEUTROPHILS NFR BLD AUTO: 65.7 %
PLATELET # BLD AUTO: 222 K/UL
POTASSIUM SERPL-SCNC: 4 MMOL/L
PROT SERPL-MCNC: 7.5 G/DL
RBC # BLD: 6.09 M/UL
RBC # FLD: 18.1 %
SODIUM SERPL-SCNC: 143 MMOL/L
T4 SERPL-MCNC: 9.4 UG/DL
TIBC SERPL-MCNC: 305 UG/DL
TRIGL SERPL-MCNC: 170 MG/DL
TSH SERPL-ACNC: 2.89 UIU/ML
UIBC SERPL-MCNC: 244 UG/DL
VIT B12 SERPL-MCNC: 810 PG/ML
WBC # FLD AUTO: 9 K/UL

## 2019-04-23 ENCOUNTER — APPOINTMENT (OUTPATIENT)
Dept: UROGYNECOLOGY | Facility: CLINIC | Age: 84
End: 2019-04-23
Payer: MEDICARE

## 2019-04-23 LAB
BILIRUB UR QL STRIP: NORMAL
CLARITY UR: CLEAR
COLLECTION METHOD: NORMAL
GLUCOSE UR-MCNC: 100
HCG UR QL: 1 EU/DL
HGB UR QL STRIP.AUTO: NORMAL
KETONES UR-MCNC: NORMAL
LEUKOCYTE ESTERASE UR QL STRIP: NORMAL
NITRITE UR QL STRIP: NORMAL
PH UR STRIP: 5
PROT UR STRIP-MCNC: 30
SP GR UR STRIP: <=1.005

## 2019-04-23 PROCEDURE — 99213 OFFICE O/P EST LOW 20 MIN: CPT

## 2019-04-24 NOTE — HISTORY OF PRESENT ILLNESS
[FreeTextEntry1] : Pt w/ known hx DI on Amytriptyline, vaginal atrophy, POP supported by RS #1 pessary presents to office today c/o recurring urinary frequency, urgency and dysuria.  Pt was evaluated in office 2 weeks ago for same symptoms and catheterized office urine dip was negative for UTI.  Pt has con't applying manually twice weekly Estrace cream per vagina and feels it is causing her symptoms to worsen.  Pt reports she applies cream with clean hands and washes external genitalia area appropriately prior to application.  Pt took Uristat medication with mild improvement.  Denies any abd/pelvic/vaginal pain, hematuria, oliguria, F/C/N/V/D/C.  Reports she drinks seltzer water, carbonated beverages, caffeine.

## 2019-04-24 NOTE — DISCUSSION/SUMMARY
[FreeTextEntry1] : Office urine dip clear orange urine d/t Uristat, +Nitrites, +Small Leukocytes, Neg Blood\par UCS sent (unable send UA d/t discolored urine from Uristat Rx) will call pt when UCS results available if need tx\par Advised pt to increase PO fluids daily, avoid carbonated beverages, con't Uristat meds Q6-8hrs PRN for bladder discomforts\par Con't Amytriptyline dialy for DI/Nocturia\par Instructed pt to call the office immediately if any problems or concerns and she verbalized understanding.\par \par

## 2019-04-24 NOTE — PHYSICAL EXAM
[No Acute Distress] : in no acute distress [Well developed] : well developed [Well Nourished] : ~L well nourished [Good Hygeine] : demonstrates good hygeine [Oriented x3] : oriented to person, place, and time [Normal Memory] : ~T memory was ~L unimpaired [Normal Mood/Affect] : mood and affect are normal [Anxiety] : patient is anxious [Warm and Dry] : was warm and dry to touch [Normal Gait] : gait was normal [No Invol. Movements] : no involuntary movements were seen [Vulvar Atrophy] : vulvar atrophy [Labia Majora] : were normal [No Bleeding] : there was no active vaginal bleeding [Atrophy] : atrophy [Labia Minora] : were normal [Tenderness] : ~T no ~M abdominal tenderness observed [Distended] : not distended [de-identified] : no prolapse or pessary visible at vault with valsalva [FreeTextEntry4] : pessary intact in vault

## 2019-04-24 NOTE — PROCEDURE
[Straight Catheterization] : insertion of a straight catheter [Patient] : the patient [Urinary Frequency] : urinary frequency [___ Fr Straight Tip] : a [unfilled] in [place] straight tip catheter [None] : there were no complications with the catheter insertion [Clear] : clear [Culture] : culture [Urinalysis] : urinalysis [No Complications] : no complications [Tolerated Well] : the patient tolerated the procedure well [Post procedure instructions and information given] : Post procedure instructions and information were given and reviewed with patient. [0] : 0 [de-identified] : PVR 50cc's clear orange urine (pt took Uristat); Neg Blood, Small Leukocytes, +Nitrites (d/t Uristat)

## 2019-04-26 ENCOUNTER — RESULT REVIEW (OUTPATIENT)
Age: 84
End: 2019-04-26

## 2019-04-26 LAB — BACTERIA UR CULT: ABNORMAL

## 2019-04-26 RX ORDER — SULFAMETHOXAZOLE AND TRIMETHOPRIM 400; 80 MG/1; MG/1
400-80 TABLET ORAL TWICE DAILY
Qty: 10 | Refills: 0 | Status: DISCONTINUED | COMMUNITY
Start: 2019-01-16 | End: 2019-04-26

## 2019-05-13 ENCOUNTER — APPOINTMENT (OUTPATIENT)
Dept: CARDIOLOGY | Facility: CLINIC | Age: 84
End: 2019-05-13
Payer: MEDICARE

## 2019-05-13 VITALS — SYSTOLIC BLOOD PRESSURE: 140 MMHG | DIASTOLIC BLOOD PRESSURE: 76 MMHG

## 2019-05-13 VITALS
OXYGEN SATURATION: 94 % | RESPIRATION RATE: 12 BRPM | HEIGHT: 59 IN | WEIGHT: 120 LBS | DIASTOLIC BLOOD PRESSURE: 76 MMHG | SYSTOLIC BLOOD PRESSURE: 166 MMHG | BODY MASS INDEX: 24.19 KG/M2 | HEART RATE: 75 BPM

## 2019-05-13 PROCEDURE — 99214 OFFICE O/P EST MOD 30 MIN: CPT | Mod: 25

## 2019-05-13 RX ORDER — OMEPRAZOLE 20 MG/1
20 TABLET, ORALLY DISINTEGRATING, DELAYED RELEASE ORAL
Qty: 30 | Refills: 0 | Status: DISCONTINUED | COMMUNITY
Start: 2019-04-11 | End: 2019-05-13

## 2019-05-13 RX ORDER — OMEPRAZOLE 40 MG/1
40 CAPSULE, DELAYED RELEASE ORAL
Qty: 90 | Refills: 1 | Status: DISCONTINUED | COMMUNITY
Start: 2018-09-12 | End: 2019-05-13

## 2019-05-13 RX ORDER — AZITHROMYCIN 250 MG/1
250 TABLET, FILM COATED ORAL
Qty: 1 | Refills: 0 | Status: DISCONTINUED | COMMUNITY
Start: 2019-04-11 | End: 2019-05-13

## 2019-05-13 RX ORDER — FAMOTIDINE 20 MG/1
20 TABLET, FILM COATED ORAL
Qty: 15 | Refills: 0 | Status: DISCONTINUED | COMMUNITY
Start: 2017-03-01 | End: 2019-05-13

## 2019-05-13 RX ORDER — FLUTICASONE PROPIONATE 50 UG/1
50 SPRAY, METERED NASAL TWICE DAILY
Qty: 1 | Refills: 2 | Status: DISCONTINUED | COMMUNITY
Start: 2017-11-30 | End: 2019-05-13

## 2019-05-13 NOTE — DISCUSSION/SUMMARY
[___ Month(s)] : [unfilled] month(s) [FreeTextEntry1] : The patient is an 86-year-old female history of hypertension, hyperlipidemia, hypothyroidism, esophageal reflux, mitral stenosis and aortic stenosis, who is doing well. \par #1 Htn- dietary modifications, on losartan, amlodipine , currently off metoprolol\par #2 MS/AS- no change in exam, ECHO stable\par #3 Lipids- lipid panel acceptable\par #4 Hypothyroid- levothyroxine\par #5 General-  Podiatry for feet, if neg then neurology.We discussed adherence to a low fat, low cholesterol diet and regular daily exercise.\par

## 2019-05-13 NOTE — HISTORY OF PRESENT ILLNESS
[FreeTextEntry1] : Manasa has tingling on the bottom of her feet. She walks daily with no chest pain, palpitations or shortness of breath.

## 2019-05-13 NOTE — PHYSICAL EXAM
[No Deformities] : no deformities [Normal Conjunctiva] : the conjunctiva exhibited no abnormalities [Eyelids - No Xanthelasma] : the eyelids demonstrated no xanthelasmas [Normal Oral Mucosa] : normal oral mucosa [No Oral Pallor] : no oral pallor [No Oral Cyanosis] : no oral cyanosis [Normal Jugular Venous A Waves Present] : normal jugular venous A waves present [Normal Jugular Venous V Waves Present] : normal jugular venous V waves present [No Jugular Venous Romano A Waves] : no jugular venous romano A waves [Respiration, Rhythm And Depth] : normal respiratory rhythm and effort [Exaggerated Use Of Accessory Muscles For Inspiration] : no accessory muscle use [Auscultation Breath Sounds / Voice Sounds] : lungs were clear to auscultation bilaterally [Heart Rate And Rhythm] : heart rate and rhythm were normal [Heart Sounds] : normal S1 and S2 [Systolic grade ___/6] : A grade [unfilled]/6 systolic murmur was heard. [Abdomen Soft] : soft [Abdomen Tenderness] : non-tender [Abdomen Mass (___ Cm)] : no abdominal mass palpated [Abnormal Walk] : normal gait [Gait - Sufficient For Exercise Testing] : the gait was sufficient for exercise testing [Nail Clubbing] : no clubbing of the fingernails [Cyanosis, Localized] : no localized cyanosis [Petechial Hemorrhages (___cm)] : no petechial hemorrhages [Skin Color & Pigmentation] : normal skin color and pigmentation [] : no rash [No Venous Stasis] : no venous stasis [Skin Lesions] : no skin lesions [No Skin Ulcers] : no skin ulcer [Oriented To Time, Place, And Person] : oriented to person, place, and time [No Xanthoma] : no  xanthoma was observed [Affect] : the affect was normal [Mood] : the mood was normal [No Anxiety] : not feeling anxious

## 2019-06-10 ENCOUNTER — MESSAGE (OUTPATIENT)
Age: 84
End: 2019-06-10

## 2019-06-13 ENCOUNTER — RX RENEWAL (OUTPATIENT)
Age: 84
End: 2019-06-13

## 2019-06-13 ENCOUNTER — APPOINTMENT (OUTPATIENT)
Dept: OPHTHALMOLOGY | Facility: CLINIC | Age: 84
End: 2019-06-13
Payer: MEDICARE

## 2019-06-13 DIAGNOSIS — H31.011 MACULA SCARS OF POSTERIOR POLE (POSTINFLAMMATORY) (POST-TRAUMATIC), RIGHT EYE: ICD-10-CM

## 2019-06-13 DIAGNOSIS — H25.091 OTHER AGE-RELATED INCIPIENT CATARACT, RIGHT EYE: ICD-10-CM

## 2019-06-13 PROCEDURE — 92012 INTRM OPH EXAM EST PATIENT: CPT

## 2019-06-13 PROCEDURE — 92015 DETERMINE REFRACTIVE STATE: CPT

## 2019-07-10 ENCOUNTER — APPOINTMENT (OUTPATIENT)
Dept: UROGYNECOLOGY | Facility: CLINIC | Age: 84
End: 2019-07-10
Payer: MEDICARE

## 2019-07-10 ENCOUNTER — MESSAGE (OUTPATIENT)
Age: 84
End: 2019-07-10

## 2019-07-10 PROCEDURE — 99214 OFFICE O/P EST MOD 30 MIN: CPT | Mod: 25

## 2019-07-10 PROCEDURE — 51701 INSERT BLADDER CATHETER: CPT

## 2019-07-12 NOTE — DISCUSSION/SUMMARY
[FreeTextEntry1] : Could not perform in office UA due to recent dose of Pyridium.  Urine culture sent. Will treat empirically with nitrofurantoin. She will only take if symptoms return. Otherwise she will wait for culture results to start treatment.   \par \par -I offered abx prophylaxis but pt declined stating that her stomach is very sensitive.  She would like to stop vaginal estrogen as she noticed an increase in UTI symptoms when she started vaginal estrogen.   I advised this is unlikely due to the use of vaginal estrogen but she can trial her self off if she prefers.\par \par -Pt to RTO in 3 months for f/u of prolapse or sooner if needed. She agrees to call office with any problems/concerns.

## 2019-07-12 NOTE — HISTORY OF PRESENT ILLNESS
[FreeTextEntry1] : Pt presents to office for s/o dysuria which started yesterday.  She reports improvement in symptoms after Pyridium.  Denies any other symptoms.  this would be the 4th episode of UTI symptoms  but only one confirmed urine culture.  She reports these episodes started after starting vaginal estrogen. \par \par H/o cystocele. Denies any issues with pessary.  she had an appt for next week for f/u of prolapse.

## 2019-07-12 NOTE — PHYSICAL EXAM
[No Acute Distress] : in no acute distress [Well Nourished] : ~L well nourished [Well developed] : well developed [Good Hygeine] : demonstrates good hygeine [Oriented x3] : oriented to person, place, and time [Normal Memory] : ~T memory was ~L unimpaired [Normal Mood/Affect] : mood and affect are normal [Warm and Dry] : was warm and dry to touch [Normal Gait] : gait was normal [Labia Majora] : were normal [Labia Minora] : were normal [Normal] : was normal [Normal Appearance] : general appearance was normal [Atrophy] : atrophy [No Bleeding] : there was no active vaginal bleeding [Anxiety] : patient is not anxious [Tenderness] : ~T no ~M abdominal tenderness observed [Distended] : not distended

## 2019-07-12 NOTE — PROCEDURE
[Straight Catheterization] : insertion of a straight catheter [Other ___] : [unfilled] [___ Fr Straight Tip] : a [unfilled] in [place] citizen straight tip catheter [Clear] : clear [Other: ___] : [unfilled] [Culture] : culture [Good Fit] : fits well [Pessary Washed] : washed [H2O] : H2O [None] : no bleeding [Erosion] : no evidence of erosion [Refit] : refit is not needed [Erythema] : no erythema [Discharge] : no vaginal discharge [Infection] : no evidence of infection [de-identified] : urethra clear, catheter easily passed [FreeTextEntry1] : RS #1 [FreeTextEntry8] : routine maximilian-care

## 2019-07-15 LAB — BACTERIA UR CULT: ABNORMAL

## 2019-07-15 RX ORDER — NITROFURANTOIN (MONOHYDRATE/MACROCRYSTALS) 25; 75 MG/1; MG/1
100 CAPSULE ORAL
Qty: 10 | Refills: 0 | Status: DISCONTINUED | COMMUNITY
Start: 2019-04-26 | End: 2019-07-15

## 2019-07-15 RX ORDER — SULFAMETHOXAZOLE AND TRIMETHOPRIM 800; 160 MG/1; MG/1
800-160 TABLET ORAL
Qty: 6 | Refills: 0 | Status: DISCONTINUED | COMMUNITY
Start: 2019-04-26 | End: 2019-07-15

## 2019-07-16 ENCOUNTER — CLINICAL ADVICE (OUTPATIENT)
Age: 84
End: 2019-07-16

## 2019-08-28 ENCOUNTER — MESSAGE (OUTPATIENT)
Age: 84
End: 2019-08-28

## 2019-08-28 ENCOUNTER — APPOINTMENT (OUTPATIENT)
Dept: UROGYNECOLOGY | Facility: CLINIC | Age: 84
End: 2019-08-28
Payer: MEDICARE

## 2019-08-28 PROCEDURE — 51701 INSERT BLADDER CATHETER: CPT

## 2019-08-28 PROCEDURE — 99213 OFFICE O/P EST LOW 20 MIN: CPT | Mod: 25

## 2019-08-28 NOTE — HISTORY OF PRESENT ILLNESS
[FreeTextEntry1] : Pt presents to office for s/o dysuria x 2 days.  She denies urgency, frequency, fever, chills, low back pain or blood in urine.   Last positive culture was 7/10/19.  She was treated with Keflex.  She stopped vaginal estrogen because she feels that UTI's began when she started the estrogen cream.    She took Uristat last night with no relief.

## 2019-08-28 NOTE — DISCUSSION/SUMMARY
[FreeTextEntry1] : Could not perform in office UA due to recent dose of uristat.  Urine culture sent.  Will treat empirically with Monurol.  i advised pt to restart vaginal estrogen.  She will RTO in October for f/u of prolapse, or sooner if needed.  She agrees to call office with any problems/concerns.

## 2019-08-28 NOTE — PHYSICAL EXAM
[No Acute Distress] : in no acute distress [Well developed] : well developed [Well Nourished] : ~L well nourished [Good Hygeine] : demonstrates good hygeine [Oriented x3] : oriented to person, place, and time [Normal Mood/Affect] : mood and affect are normal [Normal Memory] : ~T memory was ~L unimpaired [Warm and Dry] : was warm and dry to touch [Normal Gait] : gait was normal [Labia Majora] : were normal [Labia Minora] : were normal [Normal] : was normal [Normal Appearance] : general appearance was normal [Atrophy] : atrophy [No Bleeding] : there was no active vaginal bleeding [Tenderness] : ~T no ~M abdominal tenderness observed [Anxiety] : patient is not anxious [Distended] : not distended

## 2019-08-28 NOTE — PROCEDURE
[Straight Catheterization] : insertion of a straight catheter [Other ___] : [unfilled] [___ Fr Straight Tip] : a [unfilled] in [place] straight tip catheter [Other: ___] : [unfilled] [Culture] : culture [de-identified] : urethra clear catheter easily passed.

## 2019-09-03 ENCOUNTER — RESULT REVIEW (OUTPATIENT)
Age: 84
End: 2019-09-03

## 2019-09-03 LAB — BACTERIA UR CULT: ABNORMAL

## 2019-09-11 ENCOUNTER — APPOINTMENT (OUTPATIENT)
Dept: UROGYNECOLOGY | Facility: CLINIC | Age: 84
End: 2019-09-11
Payer: MEDICARE

## 2019-09-11 ENCOUNTER — MESSAGE (OUTPATIENT)
Age: 84
End: 2019-09-11

## 2019-09-11 PROCEDURE — 51701 INSERT BLADDER CATHETER: CPT

## 2019-09-11 PROCEDURE — 99214 OFFICE O/P EST MOD 30 MIN: CPT | Mod: 25

## 2019-09-11 NOTE — PHYSICAL EXAM
[No Acute Distress] : in no acute distress [Well Nourished] : ~L well nourished [Well developed] : well developed [Oriented x3] : oriented to person, place, and time [Good Hygeine] : demonstrates good hygeine [Normal Mood/Affect] : mood and affect are normal [Normal Memory] : ~T memory was ~L unimpaired [Warm and Dry] : was warm and dry to touch [Normal Gait] : gait was normal [Labia Majora] : were normal [Labia Minora] : were normal [Normal Appearance] : general appearance was normal [Atrophy] : atrophy [No Bleeding] : there was no active vaginal bleeding [Normal] : normal [Anxiety] : patient is not anxious [Tenderness] : ~T no ~M abdominal tenderness observed [Distended] : not distended

## 2019-09-11 NOTE — HISTORY OF PRESENT ILLNESS
[FreeTextEntry1] : Pt presents to office for s/o dysuria since yesterday.  She denies urgency, frequency, fever, chills, low back pain or blood in urine. Took Uristat last night with no relief.  Declines to take vaginal estrogen.\par \par This is the 4th set of UTI symptoms  since June.  We have 3 positive urine cultures on file.  On one occasion she was treated without obtaining a urine culture.  \par \par

## 2019-09-11 NOTE — PROCEDURE
[Straight Catheterization] : insertion of a straight catheter [Other ___] : [unfilled] [___ Fr Straight Tip] : a [unfilled] in [place] straight tip catheter [Cloudy] : cloudy [Other: ___] : [unfilled] [Culture] : culture [No Complications] : no complications [Good Fit] : fits well [Tolerated Well] : the patient tolerated the procedure well [Pessary Washed] : washed [H2O] : H2O [None] : no bleeding [Erosion] : no evidence of erosion [Refit] : refit is not needed [Erythema] : no erythema [Discharge] : no vaginal discharge [Infection] : no evidence of infection [de-identified] : urethra clear, catheter easily passed [FreeTextEntry1] : RS #3 [FreeTextEntry8] : routine maximilian-care

## 2019-09-16 ENCOUNTER — OUTPATIENT (OUTPATIENT)
Dept: OUTPATIENT SERVICES | Facility: HOSPITAL | Age: 84
LOS: 1 days | End: 2019-09-16
Payer: MEDICARE

## 2019-09-16 ENCOUNTER — RESULT REVIEW (OUTPATIENT)
Age: 84
End: 2019-09-16

## 2019-09-16 ENCOUNTER — APPOINTMENT (OUTPATIENT)
Dept: ULTRASOUND IMAGING | Facility: CLINIC | Age: 84
End: 2019-09-16
Payer: MEDICARE

## 2019-09-16 DIAGNOSIS — Z00.8 ENCOUNTER FOR OTHER GENERAL EXAMINATION: ICD-10-CM

## 2019-09-16 LAB — BACTERIA UR CULT: ABNORMAL

## 2019-09-16 PROCEDURE — 76775 US EXAM ABDO BACK WALL LIM: CPT | Mod: 26

## 2019-09-16 PROCEDURE — 76775 US EXAM ABDO BACK WALL LIM: CPT

## 2019-10-02 ENCOUNTER — NON-APPOINTMENT (OUTPATIENT)
Age: 84
End: 2019-10-02

## 2019-10-02 ENCOUNTER — APPOINTMENT (OUTPATIENT)
Dept: PULMONOLOGY | Facility: CLINIC | Age: 84
End: 2019-10-02
Payer: MEDICARE

## 2019-10-02 VITALS
BODY MASS INDEX: 23.99 KG/M2 | SYSTOLIC BLOOD PRESSURE: 148 MMHG | HEIGHT: 59 IN | DIASTOLIC BLOOD PRESSURE: 70 MMHG | WEIGHT: 119 LBS | RESPIRATION RATE: 12 BRPM | HEART RATE: 81 BPM | TEMPERATURE: 98.4 F | OXYGEN SATURATION: 95 %

## 2019-10-02 PROCEDURE — 94060 EVALUATION OF WHEEZING: CPT

## 2019-10-02 PROCEDURE — 99214 OFFICE O/P EST MOD 30 MIN: CPT | Mod: 25

## 2019-10-02 NOTE — PHYSICAL EXAM
[General Appearance - Well Developed] : well developed [Normal Appearance] : normal appearance [Well Groomed] : well groomed [General Appearance - Well Nourished] : well nourished [No Deformities] : no deformities [General Appearance - In No Acute Distress] : no acute distress [Eyelids - No Xanthelasma] : the eyelids demonstrated no xanthelasmas [Normal Conjunctiva] : the conjunctiva exhibited no abnormalities [Normal Oropharynx] : normal oropharynx [II] : II [Neck Appearance] : the appearance of the neck was normal [Jugular Venous Distention Increased] : there was no jugular-venous distention [Heart Rate And Rhythm] : heart rate and rhythm were normal [Heart Sounds] : normal S1 and S2 [Diffuse Wheezing] : diffuse wheezing [Exaggerated Use Of Accessory Muscles For Inspiration] : no accessory muscle use [Kyphosis] : kyphosis [Abdomen Soft] : soft [Bowel Sounds] : normal bowel sounds [Abnormal Walk] : normal gait [Nail Clubbing] : no clubbing of the fingernails [] : no rash [Cyanosis, Localized] : no localized cyanosis [No Focal Deficits] : no focal deficits [Oriented To Time, Place, And Person] : oriented to person, place, and time [Impaired Insight] : insight and judgment were intact [Affect] : the affect was normal [Mood] : the mood was normal

## 2019-10-02 NOTE — ASSESSMENT
[FreeTextEntry1] : In summary the patient is an 86-year-old female past medical history significant for anxiety, hypothyroidism who presents with signs and symptoms of acute bronchitis. The patient underwent which revealed normal lung volumes.\par \par patient is started on Symbicort and instructed to followup 2 weeks

## 2019-10-02 NOTE — REVIEW OF SYSTEMS
[Fever] : no fever [Chills] : no chills [Fatigue] : fatigue [Poor Appetite] : poor appetite [Cough] : cough [Sputum] : not coughing up ~M sputum [Hemoptysis] : no hemoptysis [Dyspnea] : dyspnea [Chest Tightness] : chest tightness [Pleuritic Pain] : pleuritic pain [Frequent URIs] : no frequent upper respiratory infections [Wheezing] : wheezing [Negative] : Sleep Disorder

## 2019-10-02 NOTE — REASON FOR VISIT
[Follow-Up] : a follow-up visit [Asthma] : asthma [Shortness of Breath] : shortness of Breath [Wheezing] : wheezing

## 2019-10-04 RX ORDER — FOSFOMYCIN TROMETHAMINE 3 G/1
3 POWDER ORAL
Qty: 1 | Refills: 0 | Status: DISCONTINUED | COMMUNITY
Start: 2019-08-28 | End: 2019-10-04

## 2019-10-14 ENCOUNTER — APPOINTMENT (OUTPATIENT)
Dept: CARDIOLOGY | Facility: CLINIC | Age: 84
End: 2019-10-14
Payer: MEDICARE

## 2019-10-14 ENCOUNTER — NON-APPOINTMENT (OUTPATIENT)
Age: 84
End: 2019-10-14

## 2019-10-14 VITALS
SYSTOLIC BLOOD PRESSURE: 151 MMHG | RESPIRATION RATE: 12 BRPM | HEART RATE: 82 BPM | HEIGHT: 59 IN | DIASTOLIC BLOOD PRESSURE: 73 MMHG | OXYGEN SATURATION: 90 %

## 2019-10-14 VITALS — SYSTOLIC BLOOD PRESSURE: 130 MMHG | DIASTOLIC BLOOD PRESSURE: 74 MMHG

## 2019-10-14 PROCEDURE — 99214 OFFICE O/P EST MOD 30 MIN: CPT

## 2019-10-14 NOTE — PHYSICAL EXAM
[No Deformities] : no deformities [Normal Conjunctiva] : the conjunctiva exhibited no abnormalities [Eyelids - No Xanthelasma] : the eyelids demonstrated no xanthelasmas [Normal Oral Mucosa] : normal oral mucosa [No Oral Pallor] : no oral pallor [No Oral Cyanosis] : no oral cyanosis [Normal Jugular Venous V Waves Present] : normal jugular venous V waves present [Normal Jugular Venous A Waves Present] : normal jugular venous A waves present [Respiration, Rhythm And Depth] : normal respiratory rhythm and effort [Exaggerated Use Of Accessory Muscles For Inspiration] : no accessory muscle use [No Jugular Venous Romano A Waves] : no jugular venous romano A waves [Heart Sounds] : normal S1 and S2 [Auscultation Breath Sounds / Voice Sounds] : lungs were clear to auscultation bilaterally [Heart Rate And Rhythm] : heart rate and rhythm were normal [Abdomen Soft] : soft [Systolic grade ___/6] : A grade [unfilled]/6 systolic murmur was heard. [Abdomen Tenderness] : non-tender [Abdomen Mass (___ Cm)] : no abdominal mass palpated [Abnormal Walk] : normal gait [Cyanosis, Localized] : no localized cyanosis [Gait - Sufficient For Exercise Testing] : the gait was sufficient for exercise testing [Nail Clubbing] : no clubbing of the fingernails [Petechial Hemorrhages (___cm)] : no petechial hemorrhages [Skin Color & Pigmentation] : normal skin color and pigmentation [No Venous Stasis] : no venous stasis [] : no rash [No Xanthoma] : no  xanthoma was observed [Skin Lesions] : no skin lesions [No Skin Ulcers] : no skin ulcer [Mood] : the mood was normal [Oriented To Time, Place, And Person] : oriented to person, place, and time [No Anxiety] : not feeling anxious [Affect] : the affect was normal

## 2019-10-14 NOTE — DISCUSSION/SUMMARY
[___ Month(s)] : [unfilled] month(s) [FreeTextEntry1] : The patient is an 86-year-old female history of hypertension, hyperlipidemia, hypothyroidism, esophageal reflux, mitral stenosis and aortic stenosis, who is doing well. \par #1 Htn- dietary modifications, on losartan, amlodipine , metoprolol\par #2 MS/AS- no change in exam, ECHO next visit\par #3 Lipids- lipid panel acceptable\par #4 Hypothyroid- levothyroxine\par #5 General- We discussed adherence to a low fat, low cholesterol diet and regular daily exercise.\par

## 2019-10-14 NOTE — REVIEW OF SYSTEMS
[see HPI] : see HPI [Negative] : Heme/Lymph [Dyspnea on exertion] : not dyspnea during exertion [Shortness Of Breath] : no shortness of breath [Chest Pain] : no chest pain [Lower Ext Edema] : no extremity edema [Palpitations] : no palpitations

## 2019-10-14 NOTE — HISTORY OF PRESENT ILLNESS
[FreeTextEntry1] : Manasa is walking more recently and the tingling on bottom of her feet slightly improved.  She walks daily with no chest pain, palpitations or shortness of breath.

## 2019-10-14 NOTE — REASON FOR VISIT
[Follow-Up - Clinic] : a clinic follow-up of [Hypertension] : hypertension [Hyperlipidemia] : hyperlipidemia [Aortic Stenosis] : aortic stenosis

## 2019-11-13 ENCOUNTER — RESULT REVIEW (OUTPATIENT)
Age: 84
End: 2019-11-13

## 2019-11-21 ENCOUNTER — MEDICATION RENEWAL (OUTPATIENT)
Age: 84
End: 2019-11-21

## 2019-12-06 ENCOUNTER — APPOINTMENT (OUTPATIENT)
Dept: UROGYNECOLOGY | Facility: CLINIC | Age: 84
End: 2019-12-06
Payer: MEDICARE

## 2019-12-06 PROCEDURE — 99214 OFFICE O/P EST MOD 30 MIN: CPT | Mod: 25

## 2019-12-06 PROCEDURE — 51701 INSERT BLADDER CATHETER: CPT

## 2019-12-10 ENCOUNTER — RESULT REVIEW (OUTPATIENT)
Age: 84
End: 2019-12-10

## 2019-12-10 LAB — BACTERIA UR CULT: ABNORMAL

## 2019-12-12 NOTE — PHYSICAL EXAM
[Well Nourished] : ~L well nourished [Well developed] : well developed [No Acute Distress] : in no acute distress [Good Hygeine] : demonstrates good hygeine [Oriented x3] : oriented to person, place, and time [Normal Memory] : ~T memory was ~L unimpaired [Normal Mood/Affect] : mood and affect are normal [Soft, Nontender] : the abdomen was soft and nontender [None] : no CVA tenderness [Warm and Dry] : was warm and dry to touch [Normal Gait] : gait was normal [Normal Appearance] : general appearance was normal [Atrophy] : atrophy [Cystocele] : a cystocele [Discharge] : a  ~M vaginal discharge was present [Christine] : yellow [Moderate] : there was moderate vaginal bleeding [Normal] : normal [de-identified] : significantly narrow introitus [FreeTextEntry4] : + irritations to right and posterior vaginal wall with bleeding

## 2019-12-12 NOTE — HISTORY OF PRESENT ILLNESS
[FreeTextEntry1] : Pt has hx recurrent UTI.  She is on methenamine ppx daily (as she states BID dosing causes side effects).  She has reactions to many antibiotics including sulfa, nitrofurantoin and cipro.  States she started with mild dysuria last week and took one "sulfa pill" she had at home.   Notes symptoms improved somewhat but returned yesterday with increased frequency, nocturia and dysuria.  Denies any blood in the urine, back pain, fever or chills.  She took 1 dose of pyridium about 5 hours ago.\par \par She also has hx cystocele managed with RS #1 pessary.  Reports good support but notes some increased brown discharge x 1 week.  Denies any pelvic pain, pressure or bleeding.  C/o some vaginal irritation, tried vasiline.  Denies any problems with BM.  She has appointment for pessary next week but will evaluate today.

## 2019-12-12 NOTE — DISCUSSION/SUMMARY
[FreeTextEntry1] : Unable to perform in office dip due to recent use of pyridium.  Will send CS and treat empirically with keflex.  Advised adequate fluid intake.  To stop methenamine during treatment course.\par \par Pessary left out to allow for healing.  Discussed manually reducing prolapse as needed.  F/u in 2 weeks for re-evaluation. \par \par I spent 25 minutes with patient.\par \par

## 2019-12-12 NOTE — PROCEDURE
[Straight Catheterization] : insertion of a straight catheter [Urinary Frequency] : urinary frequency [Patient] : the patient [___ Fr Straight Tip] : a [unfilled] in [place] straight tip catheter [None] : there were no complications with the catheter insertion [Clear] : clear [Culture] : culture [No Complications] : no complications [Tolerated Well] : the patient tolerated the procedure well [Post procedure instructions and information given] : Post procedure instructions and information were given and reviewed with patient. [0] : 0 [Erosion] : evidence of erosion [Good Fit] : fits well [Erythema] : erythema noted [Discharge] : there is vaginal discharge [Pessary Out] : removed [H2O] : H2O [Moderate] : moderate bleeding [Resolved w/pressure] : was resolved by applying pressure [Infection] : no evidence of infection [FreeTextEntry1] : RS #1

## 2019-12-20 ENCOUNTER — APPOINTMENT (OUTPATIENT)
Dept: UROGYNECOLOGY | Facility: CLINIC | Age: 84
End: 2019-12-20
Payer: MEDICARE

## 2019-12-20 PROCEDURE — 99213 OFFICE O/P EST LOW 20 MIN: CPT

## 2019-12-20 RX ORDER — CEPHALEXIN 500 MG/1
500 CAPSULE ORAL
Qty: 14 | Refills: 0 | Status: DISCONTINUED | COMMUNITY
Start: 2019-12-06 | End: 2019-12-20

## 2019-12-20 NOTE — PROCEDURE
[Good Fit] : fits well [Pessary Washed] : washed [H2O] : H2O [None] : no bleeding [Erythema] : no erythema [Refit] : refit is not needed [Infection] : no evidence of infection [FreeTextEntry1] : RS #1 reinserted [Discharge] : no vaginal discharge [de-identified] : small area of irritation on prolapsed portion on uterus.   [FreeTextEntry8] : routine maximilian-care

## 2019-12-20 NOTE — PHYSICAL EXAM
[Well developed] : well developed [Well Nourished] : ~L well nourished [No Acute Distress] : in no acute distress [Good Hygeine] : demonstrates good hygeine [Oriented x3] : oriented to person, place, and time [Normal Memory] : ~T memory was ~L unimpaired [Normal Mood/Affect] : mood and affect are normal [Warm and Dry] : was warm and dry to touch [Normal Gait] : gait was normal [Labia Majora] : were normal [Labia Minora] : were normal [Normal] : was normal [Normal Appearance] : general appearance was normal [Atrophy] : atrophy [Anxiety] : patient is not anxious [Tenderness] : ~T no ~M abdominal tenderness observed [Distended] : not distended

## 2019-12-20 NOTE — HISTORY OF PRESENT ILLNESS
[FreeTextEntry1] : Pt presents to office for f/u of vaginal irritation from pessary.  She reports seeing staining  x 2 days with pessary out.  She sees that the blood is coming from prolapse. \par \par Taking methenamine for recurrent uti's and amitriptyline for nocturia.

## 2019-12-20 NOTE — DISCUSSION/SUMMARY
[FreeTextEntry1] : Pt will RTO in 3 months for f/u of prolapse. Advised to restart vaginal estrogen cream.  She will call office with any problems/concerns.

## 2020-01-02 ENCOUNTER — RX RENEWAL (OUTPATIENT)
Age: 85
End: 2020-01-02

## 2020-01-27 ENCOUNTER — APPOINTMENT (OUTPATIENT)
Dept: CARDIOLOGY | Facility: CLINIC | Age: 85
End: 2020-01-27
Payer: MEDICARE

## 2020-01-27 ENCOUNTER — NON-APPOINTMENT (OUTPATIENT)
Age: 85
End: 2020-01-27

## 2020-01-27 VITALS
SYSTOLIC BLOOD PRESSURE: 150 MMHG | OXYGEN SATURATION: 97 % | DIASTOLIC BLOOD PRESSURE: 80 MMHG | RESPIRATION RATE: 12 BRPM | TEMPERATURE: 98.6 F | HEIGHT: 59 IN | WEIGHT: 117 LBS | BODY MASS INDEX: 23.59 KG/M2 | HEART RATE: 74 BPM

## 2020-01-27 VITALS — SYSTOLIC BLOOD PRESSURE: 142 MMHG | DIASTOLIC BLOOD PRESSURE: 70 MMHG

## 2020-01-27 PROCEDURE — 93306 TTE W/DOPPLER COMPLETE: CPT

## 2020-01-27 PROCEDURE — 99214 OFFICE O/P EST MOD 30 MIN: CPT

## 2020-01-27 RX ORDER — CEPHALEXIN 500 MG/1
500 CAPSULE ORAL
Qty: 14 | Refills: 0 | Status: DISCONTINUED | COMMUNITY
Start: 2019-07-15 | End: 2020-01-27

## 2020-01-27 RX ORDER — SULFAMETHOXAZOLE AND TRIMETHOPRIM 800; 160 MG/1; MG/1
800-160 TABLET ORAL
Qty: 10 | Refills: 0 | Status: DISCONTINUED | COMMUNITY
Start: 2019-09-11 | End: 2020-01-27
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
## 2020-01-27 NOTE — DISCUSSION/SUMMARY
[___ Month(s)] : [unfilled] month(s) [FreeTextEntry1] : The patient is an 86-year-old female history of hypertension, hyperlipidemia, hypothyroidism, esophageal reflux, mitral stenosis and aortic stenosis, who is very anxious\par #1 Htn- dietary modifications, on losartan, amlodipine , metoprolol\par #2 MS/AS- no change in exam, ECHO rsults pending\par #3 Lipids- lipid panel with Dr. Umanzor\par #4 Hypothyroid- levothyroxine\par #5 General- Emotional support provided. She does get exercise daily and gets out of the house daily. \par

## 2020-01-30 ENCOUNTER — RX RENEWAL (OUTPATIENT)
Age: 85
End: 2020-01-30

## 2020-02-10 ENCOUNTER — APPOINTMENT (OUTPATIENT)
Dept: PULMONOLOGY | Facility: CLINIC | Age: 85
End: 2020-02-10
Payer: MEDICARE

## 2020-02-10 ENCOUNTER — LABORATORY RESULT (OUTPATIENT)
Age: 85
End: 2020-02-10

## 2020-02-10 VITALS
BODY MASS INDEX: 23.43 KG/M2 | HEART RATE: 74 BPM | WEIGHT: 116 LBS | RESPIRATION RATE: 12 BRPM | TEMPERATURE: 98.4 F | OXYGEN SATURATION: 97 % | DIASTOLIC BLOOD PRESSURE: 62 MMHG | SYSTOLIC BLOOD PRESSURE: 158 MMHG

## 2020-02-10 DIAGNOSIS — Z00.00 ENCOUNTER FOR GENERAL ADULT MEDICAL EXAMINATION W/OUT ABNORMAL FINDINGS: ICD-10-CM

## 2020-02-10 PROCEDURE — G0439: CPT

## 2020-02-10 PROCEDURE — 99397 PER PM REEVAL EST PAT 65+ YR: CPT | Mod: GY

## 2020-02-14 LAB
ALBUMIN SERPL ELPH-MCNC: 4.7 G/DL
ALP BLD-CCNC: 59 U/L
ALT SERPL-CCNC: 12 U/L
ANION GAP SERPL CALC-SCNC: 13 MMOL/L
AST SERPL-CCNC: 20 U/L
BASOPHILS # BLD AUTO: 0.06 K/UL
BASOPHILS NFR BLD AUTO: 0.7 %
BILIRUB SERPL-MCNC: 0.6 MG/DL
BUN SERPL-MCNC: 17 MG/DL
CALCIUM SERPL-MCNC: 9.8 MG/DL
CHLORIDE SERPL-SCNC: 105 MMOL/L
CHOLEST SERPL-MCNC: 158 MG/DL
CHOLEST/HDLC SERPL: 2.9 RATIO
CO2 SERPL-SCNC: 25 MMOL/L
CREAT SERPL-MCNC: 0.62 MG/DL
EOSINOPHIL # BLD AUTO: 0.15 K/UL
EOSINOPHIL NFR BLD AUTO: 1.6 %
ESTIMATED AVERAGE GLUCOSE: 111 MG/DL
GLUCOSE SERPL-MCNC: 82 MG/DL
HBA1C MFR BLD HPLC: 5.5 %
HCT VFR BLD CALC: 40.8 %
HDLC SERPL-MCNC: 55 MG/DL
HGB BLD-MCNC: 11.7 G/DL
IMM GRANULOCYTES NFR BLD AUTO: 0.3 %
LDLC SERPL CALC-MCNC: 81 MG/DL
LYMPHOCYTES # BLD AUTO: 1.5 K/UL
LYMPHOCYTES NFR BLD AUTO: 16.3 %
MAN DIFF?: NORMAL
MCHC RBC-ENTMCNC: 18.3 PG
MCHC RBC-ENTMCNC: 28.7 GM/DL
MCV RBC AUTO: 63.7 FL
MONOCYTES # BLD AUTO: 0.6 K/UL
MONOCYTES NFR BLD AUTO: 6.5 %
NEUTROPHILS # BLD AUTO: 6.84 K/UL
NEUTROPHILS NFR BLD AUTO: 74.6 %
PLATELET # BLD AUTO: 220 K/UL
POTASSIUM SERPL-SCNC: 4.9 MMOL/L
PROT SERPL-MCNC: 7.1 G/DL
RBC # BLD: 6.41 M/UL
RBC # FLD: 18.9 %
SODIUM SERPL-SCNC: 144 MMOL/L
TRIGL SERPL-MCNC: 108 MG/DL
TSH SERPL-ACNC: 2.19 UIU/ML
WBC # FLD AUTO: 9.18 K/UL

## 2020-03-05 ENCOUNTER — APPOINTMENT (OUTPATIENT)
Dept: UROGYNECOLOGY | Facility: CLINIC | Age: 85
End: 2020-03-05
Payer: MEDICARE

## 2020-03-05 LAB
BILIRUB UR QL STRIP: NORMAL
CLARITY UR: NORMAL
COLLECTION METHOD: NORMAL
GLUCOSE UR-MCNC: NORMAL
HCG UR QL: 0.2 EU/DL
HGB UR QL STRIP.AUTO: NORMAL
KETONES UR-MCNC: NORMAL
LEUKOCYTE ESTERASE UR QL STRIP: NORMAL
NITRITE UR QL STRIP: NORMAL
PH UR STRIP: 6
PROT UR STRIP-MCNC: 30
SP GR UR STRIP: 1.02

## 2020-03-05 PROCEDURE — 51701 INSERT BLADDER CATHETER: CPT

## 2020-03-05 PROCEDURE — 99214 OFFICE O/P EST MOD 30 MIN: CPT | Mod: 25

## 2020-03-05 RX ORDER — TRIMETHOPRIM 100 MG/1
100 TABLET ORAL
Qty: 90 | Refills: 1 | Status: DISCONTINUED | COMMUNITY
Start: 2019-10-11 | End: 2020-03-05

## 2020-03-09 NOTE — PROCEDURE
[Straight Catheterization] : insertion of a straight catheter [Urinary Frequency] : urinary frequency [Other ___] : [unfilled] [___ Fr Straight Tip] : a [unfilled] in [place] straight tip catheter [Cloudy] : cloudy [Culture] : culture [Urinalysis] : urinalysis [No Complications] : no complications [Tolerated Well] : the patient tolerated the procedure well [Good Fit] : fits well [Pessary Washed] : washed [H2O] : H2O [None] : no bleeding [Refit] : refit is not needed [Erythema] : no erythema [Discharge] : no vaginal discharge [Infection] : no evidence of infection [de-identified] : urethra clear, catheter easily passed [FreeTextEntry1] : RS #1 reinserted [de-identified] : small area of irritation on prolapsed portion on uterus.   [FreeTextEntry8] : routine maximilian-care

## 2020-03-09 NOTE — HISTORY OF PRESENT ILLNESS
[FreeTextEntry1] : Pt presents to office with s/o dysuria and frequency x 2 days. She denies fever, chills, low back pain or blood in urine.  Pt has h/o POP as well and denies any issues with pessary.  \par \par Pt has h/o recurrent UTI's for which she was to be taking Methenamine.  She was also taking Amitriptyline for nocturia. She stopped both of these medications when she ran out of supply.  The last dose of Methenamine was 2 weeks ago.  She may have UTI due to this lapse in taking Methenamine.

## 2020-03-09 NOTE — DISCUSSION/SUMMARY
[FreeTextEntry1] : In office UA indicates likely UTI.  Will treat empirically with Keflex.  UA and c/s sent.  She will restart methenamine after Keflex is completed.  For nocturia she will restart Amitriptyline.  She is doing well with pessary. She will RTO in 3 month for f/u of prolapse. She agrees to call office with any problems/concerns.   Pt understands plan and provided with a written copy of plan.

## 2020-03-10 LAB
APPEARANCE: ABNORMAL
BACTERIA: NEGATIVE
BILIRUBIN URINE: NEGATIVE
BLOOD URINE: NORMAL
COLOR: YELLOW
GLUCOSE QUALITATIVE U: NEGATIVE
HYALINE CASTS: 2 /LPF
KETONES URINE: NEGATIVE
LEUKOCYTE ESTERASE URINE: ABNORMAL
MICROSCOPIC-UA: NORMAL
NITRITE URINE: NEGATIVE
PH URINE: 6.5
PROTEIN URINE: ABNORMAL
RED BLOOD CELLS URINE: 13 /HPF
SPECIFIC GRAVITY URINE: 1.02
SQUAMOUS EPITHELIAL CELLS: 0 /HPF
UROBILINOGEN URINE: NORMAL
WHITE BLOOD CELLS URINE: >720 /HPF

## 2020-03-18 ENCOUNTER — APPOINTMENT (OUTPATIENT)
Dept: CARDIOLOGY | Facility: CLINIC | Age: 85
End: 2020-03-18
Payer: MEDICARE

## 2020-03-18 ENCOUNTER — NON-APPOINTMENT (OUTPATIENT)
Age: 85
End: 2020-03-18

## 2020-03-18 VITALS
DIASTOLIC BLOOD PRESSURE: 72 MMHG | RESPIRATION RATE: 12 BRPM | BODY MASS INDEX: 23.39 KG/M2 | HEIGHT: 59 IN | WEIGHT: 116 LBS | SYSTOLIC BLOOD PRESSURE: 147 MMHG | OXYGEN SATURATION: 95 % | HEART RATE: 88 BPM

## 2020-03-18 PROCEDURE — 99214 OFFICE O/P EST MOD 30 MIN: CPT | Mod: 25

## 2020-03-18 PROCEDURE — 93000 ELECTROCARDIOGRAM COMPLETE: CPT

## 2020-03-18 NOTE — REASON FOR VISIT
[Aortic Stenosis] : aortic stenosis [Acute Exacerbation] : an acute exacerbation of [Chest Pain] : chest pain

## 2020-03-18 NOTE — DISCUSSION/SUMMARY
[___ Month(s)] : [unfilled] month(s) [FreeTextEntry1] : The patient is an 87-year-old female history of hypertension, hyperlipidemia, hypothyroidism, esophageal reflux, mitral stenosis and aortic stenosis, with atypical chest pain may be related to breast\par #1 Htn- dietary modifications, on losartan, amlodipine , metoprolol\par #2 MS/AS- no change in exam\par #3 Lipids- lipid panel acceptable\par #4 Hypothyroid- levothyroxine\par #5 General- Refer to gyn for breast lump and pain.\par

## 2020-03-18 NOTE — HISTORY OF PRESENT ILLNESS
[FreeTextEntry1] : Manasa developed pain under left breast two days ago. She felt worse when lying on left side. Last night slept in chair and feels a little better today. Feels lump in left breast.

## 2020-03-30 ENCOUNTER — RX RENEWAL (OUTPATIENT)
Age: 85
End: 2020-03-30

## 2020-04-02 PROBLEM — R09.81 NASAL CONGESTION WITH RHINORRHEA: Status: ACTIVE | Noted: 2017-11-30

## 2020-04-15 ENCOUNTER — APPOINTMENT (OUTPATIENT)
Dept: UROGYNECOLOGY | Facility: CLINIC | Age: 85
End: 2020-04-15
Payer: MEDICARE

## 2020-04-15 PROCEDURE — 99213 OFFICE O/P EST LOW 20 MIN: CPT

## 2020-04-16 NOTE — DISCUSSION/SUMMARY
[FreeTextEntry1] : Pt informed that on exam, there was a friable area of erythema that is the cause for her staining.  Pessary was reinserted. She was counseled on using vaginal estrogen, but she declined. She will try non hormonal vaginal lubricant such as Luvena or Replens.  Pessary precautions and instructions reviewed \par \par She will continue using methenamine hippurate as directed.\par \par She will RTO in 6-8 weeks for f/u of prolapse or sooner if needed.  Pt agrees to call office with any problems/concerns.

## 2020-04-16 NOTE — PROCEDURE
[Good Fit] : fits well [Erythema] : erythema noted [Pessary Washed] : washed [H2O] : H2O [None] : no bleeding [Refit] : refit is not needed [Erosion] : no evidence of erosion [Discharge] : no vaginal discharge [Infection] : no evidence of infection [FreeTextEntry1] : RS #1  [de-identified] : small area of erythema on right proximal vaginal wall that is friable.   [FreeTextEntry8] : routine maximilian-care

## 2020-04-16 NOTE — HISTORY OF PRESENT ILLNESS
[FreeTextEntry1] : Pt presents to office with c/o vaginal staining x once today and prior to this had some days of brown discharge.  h/o POP managed with pessary.  Denies any issues with fit and support of pessary. She stopped using vaginal estrogen as she believes this was causing her to have UTI's.  She was still getting UTI's despite discontinuing estrogen. She is now on methenamine and has been doing well.  Last UTI was 6 week ago.

## 2020-04-29 ENCOUNTER — NON-APPOINTMENT (OUTPATIENT)
Age: 85
End: 2020-04-29

## 2020-04-29 ENCOUNTER — APPOINTMENT (OUTPATIENT)
Dept: CARDIOLOGY | Facility: CLINIC | Age: 85
End: 2020-04-29
Payer: MEDICARE

## 2020-04-29 VITALS
SYSTOLIC BLOOD PRESSURE: 138 MMHG | BODY MASS INDEX: 23.59 KG/M2 | WEIGHT: 117 LBS | HEIGHT: 59 IN | DIASTOLIC BLOOD PRESSURE: 72 MMHG | OXYGEN SATURATION: 99 % | HEART RATE: 85 BPM | RESPIRATION RATE: 12 BRPM | TEMPERATURE: 98.6 F

## 2020-04-29 PROCEDURE — 99214 OFFICE O/P EST MOD 30 MIN: CPT

## 2020-04-29 NOTE — PHYSICAL EXAM
[No Deformities] : no deformities [Normal Conjunctiva] : the conjunctiva exhibited no abnormalities [Normal Oral Mucosa] : normal oral mucosa [Eyelids - No Xanthelasma] : the eyelids demonstrated no xanthelasmas [No Oral Cyanosis] : no oral cyanosis [No Oral Pallor] : no oral pallor [Normal Jugular Venous V Waves Present] : normal jugular venous V waves present [No Jugular Venous Romano A Waves] : no jugular venous romano A waves [Normal Jugular Venous A Waves Present] : normal jugular venous A waves present [Respiration, Rhythm And Depth] : normal respiratory rhythm and effort [Exaggerated Use Of Accessory Muscles For Inspiration] : no accessory muscle use [Auscultation Breath Sounds / Voice Sounds] : lungs were clear to auscultation bilaterally [Heart Rate And Rhythm] : heart rate and rhythm were normal [Heart Sounds] : normal S1 and S2 [Systolic grade ___/6] : A grade [unfilled]/6 systolic murmur was heard. [Abdomen Soft] : soft [Abdomen Tenderness] : non-tender [Abdomen Mass (___ Cm)] : no abdominal mass palpated [Abnormal Walk] : normal gait [Gait - Sufficient For Exercise Testing] : the gait was sufficient for exercise testing [Nail Clubbing] : no clubbing of the fingernails [Cyanosis, Localized] : no localized cyanosis [Petechial Hemorrhages (___cm)] : no petechial hemorrhages [Skin Color & Pigmentation] : normal skin color and pigmentation [No Venous Stasis] : no venous stasis [] : no rash [Skin Lesions] : no skin lesions [No Skin Ulcers] : no skin ulcer [No Xanthoma] : no  xanthoma was observed [Affect] : the affect was normal [Oriented To Time, Place, And Person] : oriented to person, place, and time [Mood] : the mood was normal [No Anxiety] : not feeling anxious

## 2020-04-29 NOTE — DISCUSSION/SUMMARY
[___ Month(s)] : [unfilled] month(s) [FreeTextEntry1] : The patient is an 87-year-old female history of hypertension, hyperlipidemia, hypothyroidism, esophageal reflux, mitral stenosis and aortic stenosis, who is doing well \par #1 Htn- dietary modifications, on losartan, amlodipine , metoprolol\par #2 MS/AS- no change in exam, ECHO next visit\par #3 Lipids- lipid panel acceptable\par #4 Hypothyroid- levothyroxine\par #5 General- Continue daily walking with face covering. \par

## 2020-04-29 NOTE — REASON FOR VISIT
sudden onset [Aortic Stenosis] : aortic stenosis [Acute Exacerbation] : an acute exacerbation of [Chest Pain] : chest pain

## 2020-04-29 NOTE — REVIEW OF SYSTEMS
[see HPI] : see HPI [Negative] : Endocrine [Shortness Of Breath] : no shortness of breath [Dyspnea on exertion] : not dyspnea during exertion [Chest Pain] : no chest pain [Lower Ext Edema] : no extremity edema [Palpitations] : no palpitations

## 2020-04-29 NOTE — HISTORY OF PRESENT ILLNESS
[FreeTextEntry1] : Manasa has been feeling well but very stressed about COVID. Anxious to be sure she is ok. Denies any fever, chills, cough, headaches, abdominal discomfort. She goes for a walk with her son daily and no change in ability.

## 2020-05-11 ENCOUNTER — RX RENEWAL (OUTPATIENT)
Age: 85
End: 2020-05-11

## 2020-05-26 ENCOUNTER — RX RENEWAL (OUTPATIENT)
Age: 85
End: 2020-05-26

## 2020-06-01 ENCOUNTER — APPOINTMENT (OUTPATIENT)
Dept: UROGYNECOLOGY | Facility: CLINIC | Age: 85
End: 2020-06-01
Payer: MEDICARE

## 2020-06-01 VITALS — TEMPERATURE: 97.4 F

## 2020-06-01 PROCEDURE — 99213 OFFICE O/P EST LOW 20 MIN: CPT

## 2020-06-01 NOTE — PROCEDURE
[Good Fit] : fits well [Erythema] : erythema noted [Pessary Washed] : washed [None] : no bleeding [H2O] : H2O [Refit] : refit is not needed [Erosion] : no evidence of erosion [Discharge] : no vaginal discharge [Infection] : no evidence of infection [FreeTextEntry1] : RS #1  [de-identified] : small area of erythema on right proximal vaginal wall that is friable.   [FreeTextEntry8] : routine maximilian-care

## 2020-06-01 NOTE — DISCUSSION/SUMMARY
[FreeTextEntry1] : She will continue using methenamine hippurate as directed.\par \par Pt doing well with pessary. She will RTO in 12 weeks for f/u of prolapse or sooner if needed.  Pt agrees to call office with any problems/concerns.

## 2020-06-01 NOTE — PHYSICAL EXAM
[No Acute Distress] : in no acute distress [Well developed] : well developed [Well Nourished] : ~L well nourished [Good Hygeine] : demonstrates good hygeine [Oriented x3] : oriented to person, place, and time [Normal Memory] : ~T memory was ~L unimpaired [Normal Mood/Affect] : mood and affect are normal [Normal Gait] : gait was normal [Warm and Dry] : was warm and dry to touch [Labia Majora] : were normal [Labia Minora] : were normal [Normal] : was normal [Normal Appearance] : general appearance was normal [Atrophy] : atrophy [Anxiety] : patient is not anxious [Tenderness] : ~T no ~M abdominal tenderness observed [Distended] : not distended

## 2020-06-01 NOTE — HISTORY OF PRESENT ILLNESS
[FreeTextEntry1] : Pt presents to office for f/u prolapse. Denies any issues with pessary.  H/o recurrent UTI's and doing well on methenamine.

## 2020-07-09 ENCOUNTER — APPOINTMENT (OUTPATIENT)
Dept: UROGYNECOLOGY | Facility: CLINIC | Age: 85
End: 2020-07-09
Payer: MEDICARE

## 2020-07-09 PROCEDURE — 99213 OFFICE O/P EST LOW 20 MIN: CPT | Mod: 25

## 2020-07-09 RX ORDER — METHENAMINE HIPPURATE 1 G/1
1 TABLET ORAL TWICE DAILY
Qty: 180 | Refills: 3 | Status: DISCONTINUED | COMMUNITY
Start: 2019-09-11 | End: 2020-07-09

## 2020-07-09 RX ORDER — AMITRIPTYLINE HYDROCHLORIDE 25 MG/1
25 TABLET, FILM COATED ORAL
Qty: 90 | Refills: 1 | Status: DISCONTINUED | COMMUNITY
Start: 2017-10-09 | End: 2020-07-09

## 2020-07-09 RX ORDER — CEPHALEXIN 500 MG/1
500 CAPSULE ORAL
Qty: 14 | Refills: 0 | Status: DISCONTINUED | COMMUNITY
Start: 2020-03-05 | End: 2020-07-09

## 2020-07-13 NOTE — PROCEDURE
[Other ___] : [unfilled] [Straight Catheterization] : insertion of a straight catheter [Other: ___] : [unfilled] [___ Fr Straight Tip] : a [unfilled] in [place] straight tip catheter [Culture] : culture [Tolerated Well] : the patient tolerated the procedure well [No Complications] : no complications [Good Fit] : fits well [Erythema] : erythema noted [Pessary Washed] : washed [Pessary Inserted] : inserted [H2O] : H2O [Refit] : refit is not needed [Erosion] : no evidence of erosion [Discharge] : no vaginal discharge [FreeTextEntry1] : RS #2 [de-identified] : urethra clear, catheter easily passed [de-identified] : +friable cervix [de-identified] : friable cervix, no active bleeding [FreeTextEntry8] : routine maximilian-care

## 2020-07-13 NOTE — DISCUSSION/SUMMARY
[FreeTextEntry1] : Unable to perform in office UA due to recent dose of AZO.  Urine culture sent. Will treat empirically with Bactrim DS.  Regarding prophylaxis, pt declines to take Methenamine hippurate. I counseled pt that it is unlikely that the once daily dose of Methenamine is causing gross hematuria.  It may cause gross hematuria if Methenamine is taken in high doses such as 4 G daily.  Pt would still like to stop Methenamine.  \par \par Will discuss abx prophylaxis once c/s results return.  \par \par "Pink urine" could also be from friable cervix seen on exam, which can cause vaginal staining and might present itself as blood in urine.  She declines vaginal estrogen.  Will monitor her report of "pink urine," as she noted resolution once she stopped methenamine.  \par \par Pt to RTO in 3 months for f/u of prolapse or sooner if needed.  Pt agrees to call office with any problems/concerns.

## 2020-07-13 NOTE — HISTORY OF PRESENT ILLNESS
[FreeTextEntry1] : Pt presents to office for s/o dysuria x 2 days.  She reports that symptoms improved with a dose of AZO last night.  Pt has a h/o recurrent UTI's. She was recently on Methenamine but she feels that it is turning her urine pink. She then decreased it to once daily.  With once daily, she noted "pink urine," that she voided into a cup and occasionally on pad.  She stopped the methenamine and reports that urine went back to yellow color.  \par \par Denies fever, chills, low back pain or blood in urine.\par \par Declines to take vaginal estrogen as she feels this caused her to have UTI's in the past.\par

## 2020-07-13 NOTE — PHYSICAL EXAM
[Well developed] : well developed [No Acute Distress] : in no acute distress [Well Nourished] : ~L well nourished [Good Hygeine] : demonstrates good hygeine [Normal Memory] : ~T memory was ~L unimpaired [Normal Mood/Affect] : mood and affect are normal [Oriented x3] : oriented to person, place, and time [Normal Gait] : gait was normal [Warm and Dry] : was warm and dry to touch [Labia Majora] : were normal [Labia Minora] : were normal [Normal Appearance] : general appearance was normal [Atrophy] : atrophy [Normal] : was normal [No Bleeding] : there was no active vaginal bleeding [Anxiety] : patient is not anxious [Tenderness] : ~T no ~M abdominal tenderness observed [Distended] : not distended

## 2020-07-30 ENCOUNTER — APPOINTMENT (OUTPATIENT)
Dept: CARDIOLOGY | Facility: CLINIC | Age: 85
End: 2020-07-30
Payer: MEDICARE

## 2020-07-30 ENCOUNTER — NON-APPOINTMENT (OUTPATIENT)
Age: 85
End: 2020-07-30

## 2020-07-30 VITALS
BODY MASS INDEX: 21.77 KG/M2 | RESPIRATION RATE: 12 BRPM | SYSTOLIC BLOOD PRESSURE: 148 MMHG | TEMPERATURE: 98.3 F | HEIGHT: 59 IN | DIASTOLIC BLOOD PRESSURE: 77 MMHG | HEART RATE: 69 BPM | OXYGEN SATURATION: 97 % | WEIGHT: 108 LBS

## 2020-07-30 VITALS — DIASTOLIC BLOOD PRESSURE: 72 MMHG | SYSTOLIC BLOOD PRESSURE: 132 MMHG

## 2020-07-30 PROCEDURE — 99214 OFFICE O/P EST MOD 30 MIN: CPT

## 2020-07-30 PROCEDURE — 93306 TTE W/DOPPLER COMPLETE: CPT

## 2020-07-30 NOTE — PHYSICAL EXAM
[No Deformities] : no deformities [Normal Conjunctiva] : the conjunctiva exhibited no abnormalities [Eyelids - No Xanthelasma] : the eyelids demonstrated no xanthelasmas [No Oral Pallor] : no oral pallor [Normal Oral Mucosa] : normal oral mucosa [No Oral Cyanosis] : no oral cyanosis [Normal Jugular Venous V Waves Present] : normal jugular venous V waves present [Normal Jugular Venous A Waves Present] : normal jugular venous A waves present [No Jugular Venous Romano A Waves] : no jugular venous romano A waves [Respiration, Rhythm And Depth] : normal respiratory rhythm and effort [Auscultation Breath Sounds / Voice Sounds] : lungs were clear to auscultation bilaterally [Exaggerated Use Of Accessory Muscles For Inspiration] : no accessory muscle use [Heart Rate And Rhythm] : heart rate and rhythm were normal [Heart Sounds] : normal S1 and S2 [Systolic grade ___/6] : A grade [unfilled]/6 systolic murmur was heard. [Abdomen Tenderness] : non-tender [Abdomen Soft] : soft [Abdomen Mass (___ Cm)] : no abdominal mass palpated [Abnormal Walk] : normal gait [Gait - Sufficient For Exercise Testing] : the gait was sufficient for exercise testing [Nail Clubbing] : no clubbing of the fingernails [Cyanosis, Localized] : no localized cyanosis [Petechial Hemorrhages (___cm)] : no petechial hemorrhages [Skin Color & Pigmentation] : normal skin color and pigmentation [] : no rash [No Venous Stasis] : no venous stasis [Skin Lesions] : no skin lesions [No Xanthoma] : no  xanthoma was observed [No Skin Ulcers] : no skin ulcer [Affect] : the affect was normal [Oriented To Time, Place, And Person] : oriented to person, place, and time [No Anxiety] : not feeling anxious [Mood] : the mood was normal

## 2020-07-30 NOTE — REASON FOR VISIT
[Aortic Stenosis] : aortic stenosis [Chest Pain] : chest pain [Follow-Up - Clinic] : a clinic follow-up of

## 2020-07-30 NOTE — REVIEW OF SYSTEMS
[see HPI] : see HPI [Negative] : Heme/Lymph [Shortness Of Breath] : no shortness of breath [Chest Pain] : no chest pain [Dyspnea on exertion] : not dyspnea during exertion [Lower Ext Edema] : no extremity edema [Palpitations] : no palpitations

## 2020-07-30 NOTE — DISCUSSION/SUMMARY
[___ Month(s)] : [unfilled] month(s) [FreeTextEntry1] : The patient is an 87-year-old female history of hypertension, hyperlipidemia, hypothyroidism, esophageal reflux, mitral stenosis and aortic stenosis, who is doing well \par #1 Htn- dietary modifications, on losartan, amlodipine , metoprolol\par #2 MS/AS- no change in exam, ECHO preliminary no progression of disease\par #3 Lipids- lipid panel acceptable\par #4 Hypothyroid- levothyroxine\par #5 General- Continue daily walking with face covering. \par

## 2020-07-30 NOTE — HISTORY OF PRESENT ILLNESS
[FreeTextEntry1] : Manasa has been feeling well but very stressed about COVID. She goes for a walk with her son daily and no change in ability.

## 2020-08-05 ENCOUNTER — APPOINTMENT (OUTPATIENT)
Dept: PULMONOLOGY | Facility: CLINIC | Age: 85
End: 2020-08-05
Payer: MEDICARE

## 2020-08-05 VITALS
DIASTOLIC BLOOD PRESSURE: 70 MMHG | OXYGEN SATURATION: 96 % | TEMPERATURE: 97.1 F | HEART RATE: 83 BPM | SYSTOLIC BLOOD PRESSURE: 120 MMHG

## 2020-08-05 LAB
BASOPHILS # BLD AUTO: 0.04 K/UL
BASOPHILS NFR BLD AUTO: 0.7 %
EOSINOPHIL # BLD AUTO: 0.1 K/UL
EOSINOPHIL NFR BLD AUTO: 1.7 %
HCT VFR BLD CALC: 37.5 %
HGB BLD-MCNC: 11.4 G/DL
IMM GRANULOCYTES NFR BLD AUTO: 0.3 %
LYMPHOCYTES # BLD AUTO: 1.27 K/UL
LYMPHOCYTES NFR BLD AUTO: 22 %
MAN DIFF?: NORMAL
MCHC RBC-ENTMCNC: 18.5 PG
MCHC RBC-ENTMCNC: 30.4 GM/DL
MCV RBC AUTO: 60.9 FL
MONOCYTES # BLD AUTO: 0.42 K/UL
MONOCYTES NFR BLD AUTO: 7.3 %
NEUTROPHILS # BLD AUTO: 3.93 K/UL
NEUTROPHILS NFR BLD AUTO: 68 %
PLATELET # BLD AUTO: 196 K/UL
RBC # BLD: 6.16 M/UL
RBC # FLD: 18.7 %
WBC # FLD AUTO: 5.78 K/UL

## 2020-08-05 PROCEDURE — 99214 OFFICE O/P EST MOD 30 MIN: CPT

## 2020-08-06 LAB
ALBUMIN SERPL ELPH-MCNC: 4.6 G/DL
ALP BLD-CCNC: 60 U/L
ALT SERPL-CCNC: 13 U/L
ANION GAP SERPL CALC-SCNC: 16 MMOL/L
AST SERPL-CCNC: 19 U/L
BILIRUB SERPL-MCNC: 0.7 MG/DL
BUN SERPL-MCNC: 16 MG/DL
CALCIUM SERPL-MCNC: 9.3 MG/DL
CHLORIDE SERPL-SCNC: 105 MMOL/L
CHOLEST SERPL-MCNC: 155 MG/DL
CHOLEST/HDLC SERPL: 2.9 RATIO
CO2 SERPL-SCNC: 23 MMOL/L
CREAT SERPL-MCNC: 0.64 MG/DL
ESTIMATED AVERAGE GLUCOSE: 105 MG/DL
GLUCOSE SERPL-MCNC: 87 MG/DL
HBA1C MFR BLD HPLC: 5.3 %
HDLC SERPL-MCNC: 53 MG/DL
LDLC SERPL CALC-MCNC: 84 MG/DL
POTASSIUM SERPL-SCNC: 4.4 MMOL/L
PROT SERPL-MCNC: 7.2 G/DL
SARS-COV-2 IGG SERPL IA-ACNC: 0.09 INDEX
SARS-COV-2 IGG SERPL QL IA: NEGATIVE
SODIUM SERPL-SCNC: 143 MMOL/L
TRIGL SERPL-MCNC: 86 MG/DL
TSH SERPL-ACNC: 1.75 UIU/ML

## 2020-08-17 ENCOUNTER — APPOINTMENT (OUTPATIENT)
Dept: UROGYNECOLOGY | Facility: CLINIC | Age: 85
End: 2020-08-17
Payer: MEDICARE

## 2020-08-17 PROCEDURE — 99213 OFFICE O/P EST LOW 20 MIN: CPT | Mod: 25

## 2020-08-17 PROCEDURE — 51701 INSERT BLADDER CATHETER: CPT

## 2020-08-17 NOTE — DISCUSSION/SUMMARY
[FreeTextEntry1] : Unable to perform in office dip d/t recent uristat use.  CS sent.  Pt insists that she "does well" with bactrim.  Discussed resistance as she was recently treated with bactrim DS and discussed treating with a different abx.  She states she gets "bad diarrhea" and is nervous to use keflex.  Will send bactrim DS and adjust based on culture results if needed.  She will consider restarting methenamine after treatment.  Instructed to call with any questions or concerns and she verbalizes understanding.\par

## 2020-08-17 NOTE — PHYSICAL EXAM
[No Acute Distress] : in no acute distress [Well developed] : well developed [Well Nourished] : ~L well nourished [Good Hygeine] : demonstrates good hygeine [Oriented x3] : oriented to person, place, and time [Normal Memory] : ~T memory was ~L unimpaired [Normal Mood/Affect] : mood and affect are normal [Soft, Nontender] : the abdomen was soft and nontender [Normal Gait] : gait was normal [Warm and Dry] : was warm and dry to touch [Normal] : normal external genitalia [FreeTextEntry3] : significantly narrow introitus

## 2020-08-17 NOTE — HISTORY OF PRESENT ILLNESS
[FreeTextEntry1] : Pt presents to office with 1 day history of dysuria and increased frequency.  She has hx OAB.  She also has hx recurrent UTI, not currently on any ppx.  Last UTI was on 7/9/20:50-99K proteus mirabilis treated successfully with bactrim DS.  She had declined wanting to take methenamine ppx as she thought it was causing her to have blood in the urine.  She currently denies any blood in the urine, back pain, fevers or chills.  She has POP managed with RS #2 pessary, last pessary care done 7/9.  She took uristat at 6am.

## 2020-08-17 NOTE — PROCEDURE
[Straight Catheterization] : insertion of a straight catheter [Urinary Tract Infection] : a urinary tract infection [Patient] : the patient [___ Fr Straight Tip] : a [unfilled] in [place] straight tip catheter [None] : there were no complications with the catheter insertion [Other: ___] : [unfilled] [Culture] : culture [No Complications] : no complications [Tolerated Well] : the patient tolerated the procedure well [0] : 0 [Post procedure instructions and information given] : Post procedure instructions and information were given and reviewed with patient.

## 2020-08-31 ENCOUNTER — APPOINTMENT (OUTPATIENT)
Dept: UROGYNECOLOGY | Facility: CLINIC | Age: 85
End: 2020-08-31

## 2020-09-16 ENCOUNTER — APPOINTMENT (OUTPATIENT)
Dept: CARDIOLOGY | Facility: CLINIC | Age: 85
End: 2020-09-16
Payer: MEDICARE

## 2020-09-16 ENCOUNTER — NON-APPOINTMENT (OUTPATIENT)
Age: 85
End: 2020-09-16

## 2020-09-16 VITALS
RESPIRATION RATE: 12 BRPM | OXYGEN SATURATION: 97 % | WEIGHT: 116 LBS | SYSTOLIC BLOOD PRESSURE: 149 MMHG | HEART RATE: 78 BPM | HEIGHT: 59 IN | BODY MASS INDEX: 23.39 KG/M2 | DIASTOLIC BLOOD PRESSURE: 73 MMHG

## 2020-09-16 VITALS — SYSTOLIC BLOOD PRESSURE: 130 MMHG | DIASTOLIC BLOOD PRESSURE: 70 MMHG

## 2020-09-16 DIAGNOSIS — R07.89 OTHER CHEST PAIN: ICD-10-CM

## 2020-09-16 PROCEDURE — 99214 OFFICE O/P EST MOD 30 MIN: CPT

## 2020-09-16 NOTE — DISCUSSION/SUMMARY
[___ Month(s)] : [unfilled] month(s) [FreeTextEntry1] : The patient is an 87-year-old female history of hypertension, hyperlipidemia, hypothyroidism, esophageal reflux, mitral stenosis and aortic stenosis, who is doing well \par #1 Htn- dietary modifications, on losartan, amlodipine , metoprolol\par #2 MS/AS- no change in exam, ECHO no progression of disease\par #3 Lipids- lipid panel acceptable\par #4 Hypothyroid- levothyroxine\par #5 General- Continue daily walking with face covering. \par

## 2020-09-16 NOTE — REASON FOR VISIT
[Follow-Up - Clinic] : a clinic follow-up of [Aortic Stenosis] : aortic stenosis [Chest Pain] : chest pain

## 2020-09-24 ENCOUNTER — APPOINTMENT (OUTPATIENT)
Dept: OPHTHALMOLOGY | Facility: CLINIC | Age: 85
End: 2020-09-24
Payer: MEDICARE

## 2020-09-24 ENCOUNTER — NON-APPOINTMENT (OUTPATIENT)
Age: 85
End: 2020-09-24

## 2020-09-24 PROCEDURE — 92012 INTRM OPH EXAM EST PATIENT: CPT

## 2020-10-09 ENCOUNTER — APPOINTMENT (OUTPATIENT)
Dept: UROGYNECOLOGY | Facility: CLINIC | Age: 85
End: 2020-10-09
Payer: MEDICARE

## 2020-10-09 PROCEDURE — 99213 OFFICE O/P EST LOW 20 MIN: CPT

## 2020-10-09 NOTE — REASON FOR VISIT
After Visit Summary   11/5/2018    Carrie Munoz    MRN: [id number]           Patient Information     Date Of Birth          1982        Visit Information        Provider Department      11/5/2018 8:30 AM Riki Burk MD [facility]        Today's Diagnoses     Need for prophylactic vaccination and inoculation against influenza    -  1    Fibromyalgia        Sacroiliac joint pain          Care Instructions    -Start duloxetine today  -Follow-up with Dr. Sanford in 3 months  -Physical therapy  -continue gabapentin, low-intensity exercise, good sleep hygeine, and healthy diet.   -No need to schedule Rheum follow-up          Follow-ups after your visit        Additional Services     PHYSICAL THERAPY REFERRAL       If you have not heard from the scheduling office within 2 business days, please call [phone] for all locations, with the exception of [place], please call [phone] and [place], please call [phone].    Please be aware that coverage of these services is subject to the terms and limitations of your health insurance plan.  Call member services at your health plan with any benefit or coverage questions.                  Who to contact     If you have questions or need follow up information about today's clinic visit or your schedule please contact [facility] directly at [phone].  Normal or non-critical lab and imaging results will be communicated to you by MyChart, letter or phone within 4 business days after the clinic has received the results. If you do not hear from us within 7 days, please contact the clinic through MyChart or phone. If you have a critical or abnormal lab result, we will notify you by phone as soon as possible.  Submit refill requests through [employer] or call your pharmacy and they will forward the refill request to us. Please allow 3 business days for your refill to be completed.          Additional Information About  Your Visit        [employer] Information     [employer] gives you secure access to your electronic health record. If you see a primary care provider, you can also send messages to your care team and make appointments. If you have questions, please call your primary care clinic.  If you do not have a primary care provider, please call [phone] and they will assist you.        Care EveryWhere ID     This is your Care EveryWhere ID. This could be used by other organizations to access your [facility] medical records  [id number]        Your Vitals Were     Pulse Temperature Pulse Oximetry BMI (Body Mass Index)          79 97.9  F (36.6  C) (Oral) 98% 35.85 kg/m2         Blood Pressure from Last 3 Encounters:   11/05/18 114/77   08/13/18 111/78   06/29/18 110/74    Weight from Last 3 Encounters:   11/05/18 107 kg (235 lb 12.8 oz)   08/13/18 110 kg (242 lb 9.6 oz)   07/02/18 109.1 kg (240 lb 9.6 oz)                 Today's Medication Changes          These changes are accurate as of 11/5/18 11:59 PM.  If you have any questions, ask your nurse or doctor.               Start taking these medicines.        Dose/Directions    DULoxetine 30 MG EC capsule   Commonly known as:  CYMBALTA   Used for:  Fibromyalgia, Sacroiliac joint pain   Started by:  Riki Burk MD        Dose:  30 mg   Take 1 capsule (30 mg) by mouth 2 times daily   Quantity:  90 capsule   Refills:  3            Where to get your medicines      These medications were sent to [place] [id number] [facility] [address] [place] [address] [address] [address]    Hours:  TRANSPLANT PHONE NUMBER [phone] Phone:  [phone]     DULoxetine 30 MG EC capsule                Primary Care Provider Office Phone # Fax #    Narciso Sanford [phone]       [address] [address]        Equal Access to Services     JUANA MOMIN AH: Anthony Hobbs  wadomingoda erikaadaha, qaybta kawarren tirado, myriam chuberonica ah. So [facility] [phone].    ATENCIÓN: Si tamara noland, tiene a kapoor disposición servicios gratuitos de asistencia lingüística. Omar al [phone].    We comply with applicable federal civil rights laws and [place] laws. We do not discriminate on the basis of race, color, national origin, age, disability, sex, sexual orientation, or gender identity.            Thank you!     Thank you for choosing [facility] R[facility]UMATOLOG[facility]  for your care. Our goal is always to provide you with excellent care. Hearing back from our patients is one way we can continue to improve our services. Please take a few minutes to complete the written survey that you may receive in the mail after your visit with us. Thank you!             Your Updated Medication List - Protect others around you: Learn how to safely use, store and throw away your medicines at www.disposemymeds.org.          This list is accurate as of 11/5/18 11:59 PM.  Always use your most recent med list.                   Brand Name Dispense Instructions for use Diagnosis    DULoxetine 30 MG EC capsule    CYMBALTA    90 capsule    Take 1 capsule (30 mg) by mouth 2 times daily    Fibromyalgia, Sacroiliac joint pain       fluticasone 50 MCG/ACT spray    FLONASE    3 Bottle    Spray 1-2 sprays into both nostrils daily    Postnasal drip       gabapentin 300 MG capsule    NEURONTIN    150 capsule    Take 2 tablets (59140 mg) by mouth in AM/morning, 1 tablet (300 mg) at noon and 2 tablets (900 mg) at bedtime for sleep    Acute midline low back pain with right-sided sciatica       ibuprofen 600 MG tablet    ADVIL/MOTRIN    120 tablet    Take 1 tablet (600 mg) by mouth every 6 hours as needed for moderate pain    Acute midline low back pain with right-sided sciatica       levonorgestrel 20 MCG/24HR IUD    MIRENA (52 MG)    1 each    1 each (20 mcg) by Intrauterine route once for 1 dose    Encounter for  IUD insertion          [Follow-Up Visit_____] : a follow-up visit for [unfilled]

## 2020-10-09 NOTE — DISCUSSION/SUMMARY
[FreeTextEntry1] : Follow up in 3 months or sooner.  IF pt have any problems/concern to call office.  PT aware and agrees.

## 2020-10-09 NOTE — PROCEDURE
[Good Fit] : fits well [Discharge] : there is vaginal discharge [Pessary Inserted] : inserted [Pessary Washed] : washed [H2O] : H2O [None] : no bleeding [Erythema] : no erythema [Infection] : no evidence of infection [FreeTextEntry1] : Ring with support # 2 [FreeTextEntry8] : Reinforced daily pericare.

## 2020-10-09 NOTE — HISTORY OF PRESENT ILLNESS
[FreeTextEntry1] : PT doing well with pessary.  Denies any pain, discomfort, or vaginal bleeding.  Voiding and moving BM without problems.  PT on medication Methenamine to help with her recurrent UTI.  She is managing well on it.

## 2020-10-09 NOTE — PHYSICAL EXAM
[No Acute Distress] : in no acute distress [Well Nourished] : ~L well nourished [Good Hygeine] : demonstrates good hygeine [Oriented x3] : oriented to person, place, and time [Normal Memory] : ~T memory was ~L unimpaired [Normal Mood/Affect] : mood and affect are normal [Soft, Nontender] : the abdomen was soft and nontender [Warm and Dry] : was warm and dry to touch [Normal Gait] : gait was normal [Vulvar Atrophy] : vulvar atrophy [Atrophy] : atrophy [Cystocele] : a cystocele [Discharge] : a  ~M vaginal discharge was present [Scant] : scant [Christine] : yellow [No Bleeding] : there was no active vaginal bleeding

## 2020-10-12 ENCOUNTER — APPOINTMENT (OUTPATIENT)
Dept: INTERNAL MEDICINE | Facility: CLINIC | Age: 85
End: 2020-10-12
Payer: MEDICARE

## 2020-10-12 DIAGNOSIS — Z23 ENCOUNTER FOR IMMUNIZATION: ICD-10-CM

## 2020-10-12 PROCEDURE — 90686 IIV4 VACC NO PRSV 0.5 ML IM: CPT

## 2020-10-12 PROCEDURE — G0008: CPT

## 2020-11-04 ENCOUNTER — NON-APPOINTMENT (OUTPATIENT)
Age: 85
End: 2020-11-04

## 2020-11-04 ENCOUNTER — RX RENEWAL (OUTPATIENT)
Age: 85
End: 2020-11-04

## 2020-11-04 ENCOUNTER — APPOINTMENT (OUTPATIENT)
Dept: CARDIOLOGY | Facility: CLINIC | Age: 85
End: 2020-11-04
Payer: MEDICARE

## 2020-11-04 VITALS
RESPIRATION RATE: 12 BRPM | SYSTOLIC BLOOD PRESSURE: 136 MMHG | DIASTOLIC BLOOD PRESSURE: 72 MMHG | HEART RATE: 81 BPM | BODY MASS INDEX: 23.18 KG/M2 | WEIGHT: 115 LBS | TEMPERATURE: 97.1 F | OXYGEN SATURATION: 98 % | HEIGHT: 59 IN

## 2020-11-04 PROCEDURE — 99214 OFFICE O/P EST MOD 30 MIN: CPT

## 2020-11-04 NOTE — HISTORY OF PRESENT ILLNESS
[FreeTextEntry1] : Manasa had an episode getting into car when she got dizziness lasting a few seconds. She opened the windows and eventually felt better. No recurrence. Urinary frequency and now hematuria of concern.

## 2020-11-04 NOTE — DISCUSSION/SUMMARY
[___ Month(s)] : [unfilled] month(s) [FreeTextEntry1] : The patient is an 87-year-old female history of hypertension, hyperlipidemia, hypothyroidism, esophageal reflux, mitral stenosis and aortic stenosis, with episode of dizziness of unclear etiology.\par #1 Htn- dietary modifications, on losartan, amlodipine , metoprolol\par #2 MS/AS- no change in exam, ECHO no progression of disease\par #3 Lipids- lipid panel acceptable\par #4 Hypothyroid- levothyroxine\par #5 General- Continue daily walking with face covering. Refer back to Dr. Meadows for urinary frequency and UTI.\par

## 2020-11-04 NOTE — REASON FOR VISIT
[Follow-Up - Clinic] : a clinic follow-up of [Aortic Stenosis] : aortic stenosis [Chest Pain] : chest pain [Dizziness] : dizziness

## 2020-11-04 NOTE — REVIEW OF SYSTEMS
[see HPI] : see HPI [Negative] : Heme/Lymph [Palpitations] : palpitations [Shortness Of Breath] : no shortness of breath [Dyspnea on exertion] : not dyspnea during exertion [Chest Pain] : no chest pain [Lower Ext Edema] : no extremity edema

## 2020-11-06 ENCOUNTER — APPOINTMENT (OUTPATIENT)
Dept: UROGYNECOLOGY | Facility: CLINIC | Age: 85
End: 2020-11-06
Payer: MEDICARE

## 2020-11-06 DIAGNOSIS — N89.9 NONINFLAMMATORY DISORDER OF VAGINA, UNSPECIFIED: ICD-10-CM

## 2020-11-06 PROCEDURE — 99213 OFFICE O/P EST LOW 20 MIN: CPT

## 2020-11-06 NOTE — PHYSICAL EXAM
[No Acute Distress] : in no acute distress [Well Nourished] : ~L well nourished [Good Hygeine] : demonstrates good hygeine [Oriented x3] : oriented to person, place, and time [Normal Memory] : ~T memory was ~L unimpaired [Normal Mood/Affect] : mood and affect are normal [Soft, Nontender] : the abdomen was soft and nontender [Warm and Dry] : was warm and dry to touch [Normal Gait] : gait was normal [Vulvar Atrophy] : vulvar atrophy [Atrophy] : atrophy [Cystocele] : a cystocele [Discharge] : a  ~M vaginal discharge was present [Scant] : scant [Christine] : yellow [No Bleeding] : there was no active vaginal bleeding [de-identified] : No bleeding noted.  Only mild irritation along right vaginal wall rubbing from pessary.

## 2020-11-06 NOTE — HISTORY OF PRESENT ILLNESS
[FreeTextEntry1] : PT doing well with pessary.  Denies any pain, discomfort.  Voiding and moving BM without problems.  PT on medication Methenamine to help with her recurrent UTI.  She is managing well on it.\par Pt saw a tiny speck of blood but was brown and wanted to make sure she doesn't have bleeding the vagina from pessary.  \par She is using Replens and symptoms subsided but wants to be examined to make sure.

## 2020-11-18 ENCOUNTER — APPOINTMENT (OUTPATIENT)
Dept: UROGYNECOLOGY | Facility: CLINIC | Age: 85
End: 2020-11-18
Payer: MEDICARE

## 2020-11-18 ENCOUNTER — NON-APPOINTMENT (OUTPATIENT)
Age: 85
End: 2020-11-18

## 2020-11-18 VITALS — TEMPERATURE: 94.3 F

## 2020-11-18 LAB
BILIRUB UR QL STRIP: NORMAL
CLARITY UR: CLEAR
COLLECTION METHOD: NORMAL
GLUCOSE UR-MCNC: NORMAL
HCG UR QL: 0.2 EU/DL
HGB UR QL STRIP.AUTO: NORMAL
KETONES UR-MCNC: NORMAL
LEUKOCYTE ESTERASE UR QL STRIP: NORMAL
NITRITE UR QL STRIP: NORMAL
PH UR STRIP: 7
PROT UR STRIP-MCNC: 30
SP GR UR STRIP: 1.02

## 2020-11-18 PROCEDURE — 99213 OFFICE O/P EST LOW 20 MIN: CPT

## 2020-11-18 PROCEDURE — 81003 URINALYSIS AUTO W/O SCOPE: CPT | Mod: QW

## 2020-11-18 NOTE — HISTORY OF PRESENT ILLNESS
[FreeTextEntry1] : Pt presents to the office with c/o some blood she noticed in the morning when she wiped herself after she urinated.  PT has a hx of recurrent UTIs.  She knows what are her symptoms of urinary frequency, urgency, and dysuria but denies any of these symptoms.  She denies any fever, chills, or back pain.  \par The tissue she wiped with noticed the blood, only in the morning and the rest of the day is fine.\par She is on Methenamine once day.\par Last Renal US in 2019 was negative.

## 2020-11-18 NOTE — DISCUSSION/SUMMARY
[FreeTextEntry1] : Urine protein of 30.  Sending formal UA and CS.  Discuss getting another imaging and possible cysto appt.  Pt would like to wait for results UA and CS.  Pt will continue regimen Rx.\par If pt have any problems/concern to call office.  PT aware and agrees.

## 2020-11-18 NOTE — PROCEDURE
[Straight Catheterization] : insertion of a straight catheter [Hematuria] : hematuria [Patient] : the patient [___ Fr Straight Tip] : a [unfilled] in [place] straight tip catheter [Clear] : clear [Culture] : culture [Urinalysis] : urinalysis [No Complications] : no complications [Tolerated Well] : the patient tolerated the procedure well [de-identified] : PVR 50mL [FreeTextEntry9] : Urethra cleared, catheter passed.  No CVAT.

## 2020-11-20 LAB — BACTERIA UR CULT: NORMAL

## 2020-11-23 ENCOUNTER — LABORATORY RESULT (OUTPATIENT)
Age: 85
End: 2020-11-23

## 2020-11-23 ENCOUNTER — APPOINTMENT (OUTPATIENT)
Dept: PULMONOLOGY | Facility: CLINIC | Age: 85
End: 2020-11-23
Payer: MEDICARE

## 2020-11-23 VITALS
HEIGHT: 59 IN | RESPIRATION RATE: 12 BRPM | DIASTOLIC BLOOD PRESSURE: 81 MMHG | BODY MASS INDEX: 22.78 KG/M2 | SYSTOLIC BLOOD PRESSURE: 154 MMHG | WEIGHT: 113 LBS | TEMPERATURE: 97.1 F | OXYGEN SATURATION: 95 % | HEART RATE: 76 BPM

## 2020-11-23 DIAGNOSIS — R53.83 OTHER MALAISE: ICD-10-CM

## 2020-11-23 DIAGNOSIS — R53.81 OTHER MALAISE: ICD-10-CM

## 2020-11-23 PROCEDURE — 99214 OFFICE O/P EST MOD 30 MIN: CPT

## 2020-11-24 ENCOUNTER — NON-APPOINTMENT (OUTPATIENT)
Age: 85
End: 2020-11-24

## 2020-11-24 LAB
ALBUMIN SERPL ELPH-MCNC: 4.6 G/DL
ALP BLD-CCNC: 69 U/L
ALT SERPL-CCNC: 13 U/L
ANION GAP SERPL CALC-SCNC: 12 MMOL/L
AST SERPL-CCNC: 22 U/L
BASOPHILS # BLD AUTO: 0.06 K/UL
BASOPHILS NFR BLD AUTO: 0.7 %
BILIRUB SERPL-MCNC: 0.6 MG/DL
BUN SERPL-MCNC: 16 MG/DL
CALCIUM SERPL-MCNC: 10 MG/DL
CHLORIDE SERPL-SCNC: 103 MMOL/L
CHOLEST SERPL-MCNC: 146 MG/DL
CO2 SERPL-SCNC: 26 MMOL/L
CREAT SERPL-MCNC: 0.59 MG/DL
EOSINOPHIL # BLD AUTO: 0.11 K/UL
EOSINOPHIL NFR BLD AUTO: 1.4 %
ESTIMATED AVERAGE GLUCOSE: 103 MG/DL
GLUCOSE SERPL-MCNC: 87 MG/DL
HBA1C MFR BLD HPLC: 5.2 %
HCT VFR BLD CALC: 39.4 %
HDLC SERPL-MCNC: 58 MG/DL
HGB BLD-MCNC: 11.7 G/DL
IMM GRANULOCYTES NFR BLD AUTO: 0.2 %
LDLC SERPL CALC-MCNC: 77 MG/DL
LYMPHOCYTES # BLD AUTO: 1.59 K/UL
LYMPHOCYTES NFR BLD AUTO: 19.6 %
MAN DIFF?: NORMAL
MCHC RBC-ENTMCNC: 18.4 PG
MCHC RBC-ENTMCNC: 29.7 GM/DL
MCV RBC AUTO: 61.9 FL
MONOCYTES # BLD AUTO: 0.49 K/UL
MONOCYTES NFR BLD AUTO: 6 %
NEUTROPHILS # BLD AUTO: 5.84 K/UL
NEUTROPHILS NFR BLD AUTO: 72.1 %
NONHDLC SERPL-MCNC: 88 MG/DL
PLATELET # BLD AUTO: 211 K/UL
POTASSIUM SERPL-SCNC: 5.3 MMOL/L
PROT SERPL-MCNC: 7.1 G/DL
RBC # BLD: 6.36 M/UL
RBC # FLD: 19.1 %
SODIUM SERPL-SCNC: 141 MMOL/L
TRIGL SERPL-MCNC: 56 MG/DL
TSH SERPL-ACNC: 2.96 UIU/ML
WBC # FLD AUTO: 8.11 K/UL

## 2020-11-25 LAB
APPEARANCE: CLEAR
BACTERIA: NEGATIVE
BILIRUBIN URINE: NEGATIVE
BLOOD URINE: NEGATIVE
COLOR: YELLOW
GLUCOSE QUALITATIVE U: NEGATIVE
HYALINE CASTS: 2 /LPF
KETONES URINE: NEGATIVE
LEUKOCYTE ESTERASE URINE: NEGATIVE
MICROSCOPIC-UA: NORMAL
NITRITE URINE: NEGATIVE
PH URINE: 6.5
PROTEIN URINE: ABNORMAL
RED BLOOD CELLS URINE: 3 /HPF
SPECIFIC GRAVITY URINE: 1.02
SQUAMOUS EPITHELIAL CELLS: 3 /HPF
UROBILINOGEN URINE: NORMAL
WHITE BLOOD CELLS URINE: 5 /HPF

## 2020-12-09 ENCOUNTER — APPOINTMENT (OUTPATIENT)
Dept: PULMONOLOGY | Facility: CLINIC | Age: 85
End: 2020-12-09
Payer: MEDICARE

## 2020-12-09 VITALS
RESPIRATION RATE: 12 BRPM | TEMPERATURE: 97.1 F | DIASTOLIC BLOOD PRESSURE: 72 MMHG | HEART RATE: 77 BPM | HEIGHT: 59 IN | OXYGEN SATURATION: 97 % | SYSTOLIC BLOOD PRESSURE: 168 MMHG

## 2020-12-09 DIAGNOSIS — E87.1 HYPO-OSMOLALITY AND HYPONATREMIA: ICD-10-CM

## 2020-12-09 PROCEDURE — 99214 OFFICE O/P EST MOD 30 MIN: CPT

## 2020-12-10 ENCOUNTER — APPOINTMENT (OUTPATIENT)
Dept: ULTRASOUND IMAGING | Facility: CLINIC | Age: 85
End: 2020-12-10

## 2020-12-10 ENCOUNTER — OUTPATIENT (OUTPATIENT)
Dept: OUTPATIENT SERVICES | Facility: HOSPITAL | Age: 85
LOS: 1 days | End: 2020-12-10
Payer: MEDICARE

## 2020-12-10 DIAGNOSIS — Z00.8 ENCOUNTER FOR OTHER GENERAL EXAMINATION: ICD-10-CM

## 2020-12-10 PROCEDURE — 76775 US EXAM ABDO BACK WALL LIM: CPT

## 2020-12-10 PROCEDURE — 76775 US EXAM ABDO BACK WALL LIM: CPT | Mod: 26

## 2020-12-14 ENCOUNTER — APPOINTMENT (OUTPATIENT)
Dept: UROGYNECOLOGY | Facility: CLINIC | Age: 85
End: 2020-12-14
Payer: MEDICARE

## 2020-12-14 ENCOUNTER — OUTPATIENT (OUTPATIENT)
Dept: OUTPATIENT SERVICES | Facility: HOSPITAL | Age: 85
LOS: 1 days | End: 2020-12-14
Payer: MEDICARE

## 2020-12-14 VITALS
TEMPERATURE: 96.8 F | SYSTOLIC BLOOD PRESSURE: 161 MMHG | OXYGEN SATURATION: 97 % | HEART RATE: 77 BPM | DIASTOLIC BLOOD PRESSURE: 75 MMHG

## 2020-12-14 DIAGNOSIS — Z01.818 ENCOUNTER FOR OTHER PREPROCEDURAL EXAMINATION: ICD-10-CM

## 2020-12-14 DIAGNOSIS — R31.29 OTHER MICROSCOPIC HEMATURIA: ICD-10-CM

## 2020-12-14 PROCEDURE — 52000 CYSTOURETHROSCOPY: CPT

## 2020-12-15 PROBLEM — J06.9 ACUTE URI: Status: RESOLVED | Noted: 2017-11-30 | Resolved: 2020-12-15

## 2020-12-21 PROBLEM — Z87.09 HISTORY OF ACUTE BRONCHITIS WITH BRONCHOSPASM: Status: RESOLVED | Noted: 2017-11-30 | Resolved: 2020-12-21

## 2021-01-06 ENCOUNTER — APPOINTMENT (OUTPATIENT)
Dept: CARDIOLOGY | Facility: CLINIC | Age: 86
End: 2021-01-06

## 2021-01-14 ENCOUNTER — RX RENEWAL (OUTPATIENT)
Age: 86
End: 2021-01-14

## 2021-01-15 ENCOUNTER — APPOINTMENT (OUTPATIENT)
Dept: UROGYNECOLOGY | Facility: CLINIC | Age: 86
End: 2021-01-15
Payer: MEDICARE

## 2021-01-15 DIAGNOSIS — N39.41 URGE INCONTINENCE: ICD-10-CM

## 2021-01-15 PROCEDURE — 99213 OFFICE O/P EST LOW 20 MIN: CPT

## 2021-01-15 NOTE — PROCEDURE
[Good Fit] : fits well [Discharge] : there is vaginal discharge [Pessary Inserted] : inserted [Pessary Washed] : washed [H2O] : H2O [None] : no bleeding [Erythema] : no erythema [FreeTextEntry1] : Ring with support # 2 [Infection] : no evidence of infection [FreeTextEntry8] : Reinforced daily pericare.

## 2021-01-15 NOTE — PHYSICAL EXAM
[No Acute Distress] : in no acute distress [Well Nourished] : ~L well nourished [Good Hygeine] : demonstrates good hygeine [Oriented x3] : oriented to person, place, and time [Normal Memory] : ~T memory was ~L unimpaired [Normal Mood/Affect] : mood and affect are normal [Soft, Nontender] : the abdomen was soft and nontender [Warm and Dry] : was warm and dry to touch [Normal Gait] : gait was normal [Vulvar Atrophy] : vulvar atrophy [Atrophy] : atrophy [Cystocele] : a cystocele [Discharge] : a  ~M vaginal discharge was present [Scant] : scant [Christine] : yellow [No Bleeding] : there was no active vaginal bleeding [de-identified] : No bleeding noted.  Only mild irritation along right vaginal wall rubbing from pessary.

## 2021-01-15 NOTE — END OF VISIT
[FreeTextEntry3] : I have reviewed the above and agree with all pertinent clinical information including history and physical examination and agree with treatment plan.\par \par

## 2021-01-15 NOTE — HISTORY OF PRESENT ILLNESS
[FreeTextEntry1] : PT doing well with pessary.  Denies any pain, discomfort.  Voiding and moving BM without problems.  PT on medication Methenamine to help with her recurrent UTI.  She is managing well on it.\par   \par

## 2021-01-15 NOTE — DISCUSSION/SUMMARY
The following page was sent to Dr. Cates: PACU 1. [place]. Pt will meet Phase I DC criteria shortly pending a brief op note. Just wanted to verify you did not need labwork or DC meds. Thanks! Berto BAIRD [phone]   [FreeTextEntry1] : \par Follow up in 3 months or sooner.  IF pt have any problems/concern to call office.  PT aware and agrees.

## 2021-01-27 ENCOUNTER — APPOINTMENT (OUTPATIENT)
Dept: CARDIOLOGY | Facility: CLINIC | Age: 86
End: 2021-01-27
Payer: MEDICARE

## 2021-01-27 ENCOUNTER — NON-APPOINTMENT (OUTPATIENT)
Age: 86
End: 2021-01-27

## 2021-01-27 VITALS
HEART RATE: 81 BPM | DIASTOLIC BLOOD PRESSURE: 75 MMHG | OXYGEN SATURATION: 96 % | TEMPERATURE: 96.9 F | BODY MASS INDEX: 23.79 KG/M2 | WEIGHT: 118 LBS | SYSTOLIC BLOOD PRESSURE: 152 MMHG | HEIGHT: 59 IN | RESPIRATION RATE: 12 BRPM

## 2021-01-27 VITALS — SYSTOLIC BLOOD PRESSURE: 134 MMHG | DIASTOLIC BLOOD PRESSURE: 74 MMHG

## 2021-01-27 PROCEDURE — 99214 OFFICE O/P EST MOD 30 MIN: CPT

## 2021-01-27 PROCEDURE — 93000 ELECTROCARDIOGRAM COMPLETE: CPT

## 2021-01-27 NOTE — HISTORY OF PRESENT ILLNESS
[FreeTextEntry1] : Manasa is upset about last injection for macular degeneration and now eye totally bloody appearing. She otherwise feels well with no chest pain, palpitations or shortness of breath.

## 2021-01-27 NOTE — DISCUSSION/SUMMARY
[___ Month(s)] : [unfilled] month(s) [FreeTextEntry1] : The patient is an 87-year-old female history of hypertension, hyperlipidemia, hypothyroidism, esophageal reflux, mitral stenosis and aortic stenosis, who is doing well. \par #1 Htn- dietary modifications, on losartan, amlodipine , metoprolol\par #2 MS/AS- no change in exam, ECHO no progression of disease\par #3 Lipids- lipid panel acceptable\par #4 Hypothyroid- levothyroxine\par #5 General- Continue daily walking with face covering. F/u ophtho for bleeding post injection for macular degeneration.\par

## 2021-01-27 NOTE — REVIEW OF SYSTEMS
[see HPI] : see HPI [Palpitations] : palpitations [Negative] : Heme/Lymph [Shortness Of Breath] : no shortness of breath [Dyspnea on exertion] : not dyspnea during exertion [Chest Pain] : no chest pain [Lower Ext Edema] : no extremity edema

## 2021-03-10 ENCOUNTER — APPOINTMENT (OUTPATIENT)
Dept: PULMONOLOGY | Facility: CLINIC | Age: 86
End: 2021-03-10
Payer: MEDICARE

## 2021-03-10 ENCOUNTER — LABORATORY RESULT (OUTPATIENT)
Age: 86
End: 2021-03-10

## 2021-03-10 VITALS
RESPIRATION RATE: 12 BRPM | OXYGEN SATURATION: 97 % | SYSTOLIC BLOOD PRESSURE: 155 MMHG | DIASTOLIC BLOOD PRESSURE: 72 MMHG | TEMPERATURE: 97.7 F | BODY MASS INDEX: 23.18 KG/M2 | HEART RATE: 75 BPM | WEIGHT: 115 LBS | HEIGHT: 59 IN

## 2021-03-10 DIAGNOSIS — S09.8XXA OTHER SPECIFIED INJURIES OF HEAD, INITIAL ENCOUNTER: ICD-10-CM

## 2021-03-10 DIAGNOSIS — R51.9 HEADACHE, UNSPECIFIED: ICD-10-CM

## 2021-03-10 PROCEDURE — 99214 OFFICE O/P EST MOD 30 MIN: CPT

## 2021-03-11 LAB
ALBUMIN SERPL ELPH-MCNC: 4.2 G/DL
ALP BLD-CCNC: 59 U/L
ALT SERPL-CCNC: 13 U/L
ANION GAP SERPL CALC-SCNC: 14 MMOL/L
AST SERPL-CCNC: 24 U/L
BASOPHILS # BLD AUTO: 0.07 K/UL
BASOPHILS NFR BLD AUTO: 1.1 %
BILIRUB SERPL-MCNC: 0.5 MG/DL
BUN SERPL-MCNC: 17 MG/DL
CALCIUM SERPL-MCNC: 9.6 MG/DL
CHLORIDE SERPL-SCNC: 105 MMOL/L
CHOLEST SERPL-MCNC: 149 MG/DL
CO2 SERPL-SCNC: 23 MMOL/L
CREAT SERPL-MCNC: 0.56 MG/DL
EOSINOPHIL # BLD AUTO: 0.11 K/UL
EOSINOPHIL NFR BLD AUTO: 1.7 %
ESTIMATED AVERAGE GLUCOSE: 105 MG/DL
GLUCOSE SERPL-MCNC: 72 MG/DL
HBA1C MFR BLD HPLC: 5.3 %
HCT VFR BLD CALC: 35.5 %
HDLC SERPL-MCNC: 52 MG/DL
HGB BLD-MCNC: 11.2 G/DL
IMM GRANULOCYTES NFR BLD AUTO: 0.3 %
LDLC SERPL CALC-MCNC: 78 MG/DL
LYMPHOCYTES # BLD AUTO: 1.49 K/UL
LYMPHOCYTES NFR BLD AUTO: 23.1 %
MAN DIFF?: NORMAL
MCHC RBC-ENTMCNC: 19 PG
MCHC RBC-ENTMCNC: 31.5 GM/DL
MCV RBC AUTO: 60.2 FL
MONOCYTES # BLD AUTO: 0.48 K/UL
MONOCYTES NFR BLD AUTO: 7.5 %
NEUTROPHILS # BLD AUTO: 4.27 K/UL
NEUTROPHILS NFR BLD AUTO: 66.3 %
NONHDLC SERPL-MCNC: 97 MG/DL
PLATELET # BLD AUTO: 204 K/UL
POTASSIUM SERPL-SCNC: 3.9 MMOL/L
PROT SERPL-MCNC: 7.2 G/DL
RBC # BLD: 5.9 M/UL
RBC # FLD: 18.2 %
SODIUM SERPL-SCNC: 142 MMOL/L
TRIGL SERPL-MCNC: 93 MG/DL
TSH SERPL-ACNC: 2.04 UIU/ML
WBC # FLD AUTO: 6.44 K/UL

## 2021-03-24 ENCOUNTER — APPOINTMENT (OUTPATIENT)
Dept: PULMONOLOGY | Facility: CLINIC | Age: 86
End: 2021-03-24
Payer: MEDICARE

## 2021-03-24 VITALS
OXYGEN SATURATION: 97 % | DIASTOLIC BLOOD PRESSURE: 79 MMHG | BODY MASS INDEX: 22.38 KG/M2 | HEART RATE: 85 BPM | TEMPERATURE: 96.9 F | WEIGHT: 111 LBS | HEIGHT: 59 IN | SYSTOLIC BLOOD PRESSURE: 147 MMHG

## 2021-03-24 PROCEDURE — 99214 OFFICE O/P EST MOD 30 MIN: CPT

## 2021-04-07 DIAGNOSIS — N32.81 OVERACTIVE BLADDER: ICD-10-CM

## 2021-04-08 ENCOUNTER — APPOINTMENT (OUTPATIENT)
Dept: INTERNAL MEDICINE | Facility: CLINIC | Age: 86
End: 2021-04-08

## 2021-04-16 ENCOUNTER — APPOINTMENT (OUTPATIENT)
Dept: UROGYNECOLOGY | Facility: CLINIC | Age: 86
End: 2021-04-16
Payer: MEDICARE

## 2021-04-16 VITALS — HEART RATE: 79 BPM | SYSTOLIC BLOOD PRESSURE: 124 MMHG | DIASTOLIC BLOOD PRESSURE: 71 MMHG | TEMPERATURE: 97.1 F

## 2021-04-16 DIAGNOSIS — N81.4 UTEROVAGINAL PROLAPSE, UNSPECIFIED: ICD-10-CM

## 2021-04-16 DIAGNOSIS — N39.3 STRESS INCONTINENCE (FEMALE) (MALE): ICD-10-CM

## 2021-04-16 PROCEDURE — 99213 OFFICE O/P EST LOW 20 MIN: CPT

## 2021-04-16 NOTE — PHYSICAL EXAM
[No Acute Distress] : in no acute distress [Well Nourished] : ~L well nourished [Good Hygeine] : demonstrates good hygeine [Oriented x3] : oriented to person, place, and time [Normal Memory] : ~T memory was ~L unimpaired [Normal Mood/Affect] : mood and affect are normal [Soft, Nontender] : the abdomen was soft and nontender [Warm and Dry] : was warm and dry to touch [Normal Gait] : gait was normal [Vulvar Atrophy] : vulvar atrophy [Atrophy] : atrophy [Cystocele] : a cystocele [Discharge] : a  ~M vaginal discharge was present [Scant] : scant [Christine] : yellow [No Bleeding] : there was no active vaginal bleeding [de-identified] : No bleeding noted.  Only mild irritation along right vaginal wall rubbing from pessary.

## 2021-04-16 NOTE — DISCUSSION/SUMMARY
[FreeTextEntry1] : Pelvic Prolapse:\par -Continue with Ring with support # 2.\par -Precaution and instruction were reviewed.\par \par Recurrent UTI:\par -Pt on Methenamine and will finish course.\par \par Follow up in 3 months or sooner.  IF pt have any problems/concern to call office.  PT aware and agrees.

## 2021-04-20 ENCOUNTER — RX RENEWAL (OUTPATIENT)
Age: 86
End: 2021-04-20

## 2021-04-21 ENCOUNTER — NON-APPOINTMENT (OUTPATIENT)
Age: 86
End: 2021-04-21

## 2021-04-21 ENCOUNTER — APPOINTMENT (OUTPATIENT)
Dept: CARDIOLOGY | Facility: CLINIC | Age: 86
End: 2021-04-21
Payer: MEDICARE

## 2021-04-21 VITALS
HEIGHT: 59 IN | HEART RATE: 75 BPM | SYSTOLIC BLOOD PRESSURE: 130 MMHG | TEMPERATURE: 96.9 F | BODY MASS INDEX: 22.98 KG/M2 | OXYGEN SATURATION: 95 % | DIASTOLIC BLOOD PRESSURE: 63 MMHG | WEIGHT: 114 LBS

## 2021-04-21 DIAGNOSIS — R07.9 CHEST PAIN, UNSPECIFIED: ICD-10-CM

## 2021-04-21 PROCEDURE — 99214 OFFICE O/P EST MOD 30 MIN: CPT

## 2021-04-21 PROCEDURE — 93000 ELECTROCARDIOGRAM COMPLETE: CPT

## 2021-04-21 NOTE — HISTORY OF PRESENT ILLNESS
[FreeTextEntry1] : Manasa is very nervous about health. She has an appt with neurologist. She had a fall and hit her head. She otherwise feels well with no chest pain, palpitations or shortness of breath.

## 2021-04-21 NOTE — DISCUSSION/SUMMARY
[___ Month(s)] : [unfilled] month(s) [FreeTextEntry1] : The patient is an 88-year-old female history of hypertension, hyperlipidemia, hypothyroidism, esophageal reflux, mitral stenosis and aortic stenosis, who is doing well. \par #1 Htn- dietary modifications, continue losartan, amlodipine , metoprolol\par #2 MS/AS- no change in exam, ECHO no progression of disease\par #3 Lipids- lipid panel acceptable\par #4 Hypothyroid- levothyroxine\par #5 General- Continue daily walking with face covering. Received COVID vaccine\par

## 2021-05-03 ENCOUNTER — RX RENEWAL (OUTPATIENT)
Age: 86
End: 2021-05-03

## 2021-05-24 ENCOUNTER — RX RENEWAL (OUTPATIENT)
Age: 86
End: 2021-05-24

## 2021-05-25 ENCOUNTER — APPOINTMENT (OUTPATIENT)
Dept: UROGYNECOLOGY | Facility: CLINIC | Age: 86
End: 2021-05-25
Payer: MEDICARE

## 2021-05-25 ENCOUNTER — NON-APPOINTMENT (OUTPATIENT)
Age: 86
End: 2021-05-25

## 2021-05-25 VITALS — TEMPERATURE: 97 F | DIASTOLIC BLOOD PRESSURE: 80 MMHG | SYSTOLIC BLOOD PRESSURE: 130 MMHG

## 2021-05-25 PROCEDURE — 99213 OFFICE O/P EST LOW 20 MIN: CPT

## 2021-05-26 ENCOUNTER — NON-APPOINTMENT (OUTPATIENT)
Age: 86
End: 2021-05-26

## 2021-05-26 RX ORDER — SULFAMETHOXAZOLE AND TRIMETHOPRIM 800; 160 MG/1; MG/1
800-160 TABLET ORAL
Qty: 6 | Refills: 0 | Status: COMPLETED | COMMUNITY
Start: 2020-07-09 | End: 2021-05-26

## 2021-05-27 NOTE — HISTORY OF PRESENT ILLNESS
[FreeTextEntry1] : Patient presents to office with complaints of dysuria and urinary frequency since last night. States she feels like she has an infection. Denies fever, chills, back pain or blood in urine. States she took Azo today with minimal relief. She is taking methenamine as prescribed.\par \par She states she has a pessary and is doing well with it.  She does not want routine pessary care today as she has an apt with MICHAEL Cervantes NP in July 2021. Denies pelvic pain, pressure or vaginal bleeding.

## 2021-05-27 NOTE — PROCEDURE
[Straight Catheterization] : insertion of a straight catheter [Other ___] : [unfilled] [Patient] : the patient [___ Fr Straight Tip] : a [unfilled] in [place] straight tip catheter [None] : there were no complications with the catheter insertion [Cloudy] : cloudy [Culture] : culture [Urinalysis] : urinalysis [No Complications] : no complications [Tolerated Well] : the patient tolerated the procedure well [Post procedure instructions and information given] : Post procedure instructions and information were given and reviewed with patient. [0] : 0 [FreeTextEntry1] : Urethra cleared, catheter passed. No CVAT

## 2021-05-27 NOTE — DISCUSSION/SUMMARY
[FreeTextEntry1] : 1. Dysuria, Urinary Urgency\par - Due to patient taking Azo unable to dip urine in office, urine is very orange. Formal UA/CS sent to lab\par - Due to patient symptoms will treat empirically with Cephalexin 500mg BID x 7 days. \par - Advised patient to take OTC probiotics\par - Advised patient to stop methenamine while taking course of cephalexin, once finished with abx may resume course of Abx. \par - Continue use of vaginal estrogen as prescribed. \par - Will RTO for pessary follow up in 2 months or sooner if needed\par \par Patient agrees to call office with any questions or concerns, verbalized understanding.

## 2021-05-27 NOTE — PHYSICAL EXAM
[No Acute Distress] : in no acute distress [Well developed] : well developed [Well Nourished] : ~L well nourished [Good Hygeine] : demonstrates good hygeine [Oriented x3] : oriented to person, place, and time [Normal Mood/Affect] : mood and affect are normal [None] : no CVA tenderness [Warm and Dry] : was warm and dry to touch [Normal Gait] : gait was normal [Vulvar Atrophy] : vulvar atrophy [Labia Majora] : were normal [Labia Minora] : were normal [Normal] : was normal [Atrophy] : atrophy [Tenderness] : ~T no ~M abdominal tenderness observed [Distended] : not distended

## 2021-05-28 LAB
APPEARANCE: CLEAR
BACTERIA UR CULT: NORMAL
BACTERIA: NEGATIVE
BILIRUBIN URINE: NEGATIVE
BLOOD URINE: NEGATIVE
COLOR: ABNORMAL
GLUCOSE QUALITATIVE U: NEGATIVE
HYALINE CASTS: 1 /LPF
KETONES URINE: NEGATIVE
LEUKOCYTE ESTERASE URINE: NEGATIVE
MICROSCOPIC-UA: NORMAL
NITRITE URINE: NEGATIVE
PH URINE: 6
PROTEIN URINE: ABNORMAL
RED BLOOD CELLS URINE: 2 /HPF
SPECIFIC GRAVITY URINE: 1.03
SQUAMOUS EPITHELIAL CELLS: 1 /HPF
UROBILINOGEN URINE: NORMAL
WHITE BLOOD CELLS URINE: 11 /HPF

## 2021-06-11 ENCOUNTER — APPOINTMENT (OUTPATIENT)
Dept: PULMONOLOGY | Facility: CLINIC | Age: 86
End: 2021-06-11
Payer: MEDICARE

## 2021-06-11 VITALS
HEIGHT: 59 IN | SYSTOLIC BLOOD PRESSURE: 127 MMHG | BODY MASS INDEX: 22.78 KG/M2 | RESPIRATION RATE: 14 BRPM | DIASTOLIC BLOOD PRESSURE: 76 MMHG | HEART RATE: 92 BPM | WEIGHT: 113 LBS | TEMPERATURE: 97.7 F | OXYGEN SATURATION: 96 %

## 2021-06-11 PROCEDURE — 99214 OFFICE O/P EST MOD 30 MIN: CPT

## 2021-07-20 ENCOUNTER — APPOINTMENT (OUTPATIENT)
Dept: UROGYNECOLOGY | Facility: CLINIC | Age: 86
End: 2021-07-20
Payer: MEDICARE

## 2021-07-20 VITALS — DIASTOLIC BLOOD PRESSURE: 75 MMHG | HEART RATE: 80 BPM | TEMPERATURE: 97.3 F | SYSTOLIC BLOOD PRESSURE: 137 MMHG

## 2021-07-20 DIAGNOSIS — N89.8 OTHER SPECIFIED NONINFLAMMATORY DISORDERS OF VAGINA: ICD-10-CM

## 2021-07-20 PROCEDURE — 99213 OFFICE O/P EST LOW 20 MIN: CPT

## 2021-07-21 NOTE — HISTORY OF PRESENT ILLNESS
[FreeTextEntry1] : Manasa, 89y/o presenst for follow up pelvic prolapse.  Prolapse supported with Ring with support # 2.  PT doing well with pessary.  Denies any pain, discomfort.  Voiding and moving BM without problems.  Had a speck of blood and she applied Vaseline to area and went away.  \par PT on medication Methenamine to help with her recurrent UTI.  She had an episode of UTI when she was away in [place] last month and was treated with Bactrim DS.\par Recent fall and fx left arm.  In cast and following up with orthopedists.\par   \par

## 2021-07-21 NOTE — DISCUSSION/SUMMARY
[FreeTextEntry1] : Pelvic Prolapse:\par -Continue with Ring with support # 2.\par -Precaution and instruction were reviewed.\par \par Recurrent UTI:\par -Pt on Methenamine and will finish course.\par \par Nocturia:\par -Void prior to bed.  Double void if necessary and bend with void\par \par Follow up in 3 months or sooner.  IF pt have any problems/concern to call office.  PT aware and agrees.

## 2021-08-30 ENCOUNTER — APPOINTMENT (OUTPATIENT)
Dept: CARDIOLOGY | Facility: CLINIC | Age: 86
End: 2021-08-30
Payer: MEDICARE

## 2021-08-30 ENCOUNTER — APPOINTMENT (OUTPATIENT)
Dept: UROLOGY | Facility: CLINIC | Age: 86
End: 2021-08-30
Payer: MEDICARE

## 2021-08-30 ENCOUNTER — APPOINTMENT (OUTPATIENT)
Dept: PULMONOLOGY | Facility: CLINIC | Age: 86
End: 2021-08-30

## 2021-08-30 ENCOUNTER — NON-APPOINTMENT (OUTPATIENT)
Age: 86
End: 2021-08-30

## 2021-08-30 VITALS
HEIGHT: 59 IN | RESPIRATION RATE: 12 BRPM | OXYGEN SATURATION: 95 % | BODY MASS INDEX: 22.38 KG/M2 | HEART RATE: 72 BPM | DIASTOLIC BLOOD PRESSURE: 70 MMHG | SYSTOLIC BLOOD PRESSURE: 161 MMHG | WEIGHT: 111 LBS | TEMPERATURE: 97.5 F

## 2021-08-30 DIAGNOSIS — R35.1 NOCTURIA: ICD-10-CM

## 2021-08-30 DIAGNOSIS — N39.0 URINARY TRACT INFECTION, SITE NOT SPECIFIED: ICD-10-CM

## 2021-08-30 PROCEDURE — 93000 ELECTROCARDIOGRAM COMPLETE: CPT

## 2021-08-30 PROCEDURE — 93306 TTE W/DOPPLER COMPLETE: CPT

## 2021-08-30 PROCEDURE — 99214 OFFICE O/P EST MOD 30 MIN: CPT

## 2021-08-30 PROCEDURE — 99204 OFFICE O/P NEW MOD 45 MIN: CPT

## 2021-08-30 NOTE — ASSESSMENT
[FreeTextEntry1] : Very pleasant 88-year-old woman who presents for evaluation of increased urinary frequency, nocturia, urinary urgency\par -Urine culture reviewed–negative\par -Discussed general preventative strategies for urinary tract infections\par -Discussed different treatment options for urinary urgency and nocturia\par -Trial of Vesicare\par -I discussed the risks, benefits, alternatives, and possible side effects of Vesicare therapy with the patient, including but not limited to urinary retention, dry mouth, dry eyes, constipation, and confusion.  Patient understands that he must call immediately should any of these symptoms occur\par -Follow-up in 3 to 4 weeks

## 2021-08-30 NOTE — HISTORY OF PRESENT ILLNESS
[FreeTextEntry1] : Very pleasant 88-year-old woman who presents for evaluation of urinary urgency, nocturia.  She reports a history of urinary tract infections treated with antibiotics multiple times in the past.  She reports that her most bothersome symptom is that of nocturia up to 15 times per night.  She has tried desmopressin orally which did not help her.  She is currently on methenamine for prevention of urinary tract infections.  She has a pessary.  This is very bothersome to her.  She feels like she empties her bladder completely.  No other complaints.

## 2021-08-30 NOTE — HISTORY OF PRESENT ILLNESS
[FreeTextEntry1] : Manasa had a fall and broke left wrist. CT head negative. Secondary to misstep. \par Biggest complaint is urinating all day and all night so she does not get any sleep. On a year of antibiotics. Frustrated and wants help. \par No chest pain, palpitations, dizziness or shortness of breath.

## 2021-08-30 NOTE — DISCUSSION/SUMMARY
[___ Month(s)] : in [unfilled] month(s) [FreeTextEntry1] : The patient is an 88-year-old female history of hypertension, hyperlipidemia, hypothyroidism, esophageal reflux, mitral stenosis and aortic stenosis, who is frustrated with frequent urination. \par #1 Htn-  continue losartan, amlodipine , metoprolol\par #2 MS/AS- preliminary review slightly increase gradient across AV, awaiting final reading.\par #3 Lipids- lipid panel acceptable\par #4 Hypothyroid- levothyroxine\par #5 General- Continue daily walking with face covering. Received COVID vaccine\par Fall was secondary to misstep, Now starting PT for left wrist. Wants second opinion on urologic complaints. She does have a pessary and recurrent UTI. \par

## 2021-09-13 ENCOUNTER — APPOINTMENT (OUTPATIENT)
Dept: PULMONOLOGY | Facility: CLINIC | Age: 86
End: 2021-09-13
Payer: MEDICARE

## 2021-09-13 VITALS
DIASTOLIC BLOOD PRESSURE: 69 MMHG | HEART RATE: 68 BPM | SYSTOLIC BLOOD PRESSURE: 145 MMHG | TEMPERATURE: 97.3 F | BODY MASS INDEX: 21.97 KG/M2 | HEIGHT: 59 IN | WEIGHT: 109 LBS | RESPIRATION RATE: 12 BRPM | OXYGEN SATURATION: 97 %

## 2021-09-13 DIAGNOSIS — R19.8 OTHER SPECIFIED SYMPTOMS AND SIGNS INVOLVING THE DIGESTIVE SYSTEM AND ABDOMEN: ICD-10-CM

## 2021-09-13 PROCEDURE — 99214 OFFICE O/P EST MOD 30 MIN: CPT

## 2021-09-27 ENCOUNTER — APPOINTMENT (OUTPATIENT)
Dept: UROLOGY | Facility: CLINIC | Age: 86
End: 2021-09-27

## 2021-09-29 ENCOUNTER — APPOINTMENT (OUTPATIENT)
Dept: UROGYNECOLOGY | Facility: CLINIC | Age: 86
End: 2021-09-29
Payer: MEDICARE

## 2021-09-29 VITALS — TEMPERATURE: 97.2 F

## 2021-09-29 DIAGNOSIS — R39.15 URGENCY OF URINATION: ICD-10-CM

## 2021-09-29 DIAGNOSIS — Z87.898 PERSONAL HISTORY OF OTHER SPECIFIED CONDITIONS: ICD-10-CM

## 2021-09-29 DIAGNOSIS — N30.00 ACUTE CYSTITIS W/OUT HEMATURIA: ICD-10-CM

## 2021-09-29 DIAGNOSIS — R30.0 DYSURIA: ICD-10-CM

## 2021-09-29 DIAGNOSIS — N81.9 FEMALE GENITAL PROLAPSE, UNSPECIFIED: ICD-10-CM

## 2021-09-29 DIAGNOSIS — R35.0 FREQUENCY OF MICTURITION: ICD-10-CM

## 2021-09-29 PROCEDURE — 51701 INSERT BLADDER CATHETER: CPT

## 2021-09-29 PROCEDURE — 99214 OFFICE O/P EST MOD 30 MIN: CPT | Mod: 25

## 2021-09-29 NOTE — PHYSICAL EXAM
[No Acute Distress] : in no acute distress [Well Nourished] : ~L well nourished [Good Hygeine] : demonstrates good hygeine [Oriented x3] : oriented to person, place, and time [Normal Memory] : ~T memory was ~L unimpaired [Normal Mood/Affect] : mood and affect are normal [Soft, Nontender] : the abdomen was soft and nontender [Warm and Dry] : was warm and dry to touch [Normal Gait] : gait was normal [Vulvar Atrophy] : vulvar atrophy [Atrophy] : atrophy [Cystocele] : a cystocele [Discharge] : a  ~M vaginal discharge was present [Scant] : scant [Christine] : yellow [No Bleeding] : there was no active vaginal bleeding [None] : no CVA tenderness [Labia Majora] : were normal [Labia Minora] : were normal [Normal] : was normal

## 2021-09-30 PROBLEM — N30.00 ACUTE CYSTITIS WITHOUT HEMATURIA: Status: ACTIVE | Noted: 2021-09-30

## 2021-09-30 NOTE — PROCEDURE
[Good Fit] : fits well [Discharge] : there is vaginal discharge [Pessary Inserted] : inserted [Pessary Washed] : washed [H2O] : H2O [Straight Catheterization] : insertion of a straight catheter [Urinary Tract Infection] : a urinary tract infection [Patient] : the patient [___ Fr Straight Tip] : a [unfilled] in [place] straight tip catheter [None] : there were no complications with the catheter insertion [Other: ___] : [unfilled] [Culture] : culture [No Complications] : no complications [Tolerated Well] : the patient tolerated the procedure well [Post procedure instructions and information given] : Post procedure instructions and information were given and reviewed with patient. [0] : 0 [Refit] : refit is not needed [Erosion] : no evidence of erosion [Erythema] : no erythema [Infection] : no evidence of infection [FreeTextEntry1] : Ring with support # 2 [FreeTextEntry8] : Reinforced daily pericare.

## 2021-09-30 NOTE — DISCUSSION/SUMMARY
[FreeTextEntry1] : #urinary frequency, urgency, dysuria\par - unable to dip urine in office due urine bright orange in color from Azo\par - Formal Urine CS sent to lab\par - Due to patient symptoms will treat empirically with Bactrim DS BID x 3 days\par - Stop methenamine will evaluate need to continue based on formal Urine CS results\par \par #Pelvic Prolapse:\par -Continue with Ring with support # 2.\par -Precaution and instruction were reviewed.\par \par Follow up in 3 months or sooner if needed.  IF pt have any problems/concern to call office.  PT aware and agrees.

## 2021-09-30 NOTE — HISTORY OF PRESENT ILLNESS
[FreeTextEntry1] : Manasa, 89y/o with known hx of POP manages with RS #2 presents to office with complaints of increased urinary frequency, urgency and dysuria x 1 day. States she feels she has a UTI. Denies fever, chills, back pain or blood in urine. States she took OTC Azo with relief in symptoms. She states she recently stopped the methenamine. She states she is scheduled for follow up of POP and pessary care next month but would like an exam today. She is doing well with the pessary. Denies any pelvic pain, pressure or vaginal bleeding. Denies any bowel complaints. \par

## 2021-10-01 ENCOUNTER — NON-APPOINTMENT (OUTPATIENT)
Age: 86
End: 2021-10-01

## 2021-10-01 LAB — BACTERIA UR CULT: NORMAL

## 2021-10-11 ENCOUNTER — RX RENEWAL (OUTPATIENT)
Age: 86
End: 2021-10-11

## 2021-10-15 ENCOUNTER — APPOINTMENT (OUTPATIENT)
Dept: GASTROENTEROLOGY | Facility: CLINIC | Age: 86
End: 2021-10-15

## 2021-10-25 ENCOUNTER — RX RENEWAL (OUTPATIENT)
Age: 86
End: 2021-10-25

## 2021-10-29 ENCOUNTER — APPOINTMENT (OUTPATIENT)
Dept: INTERNAL MEDICINE | Facility: CLINIC | Age: 86
End: 2021-10-29

## 2021-10-29 ENCOUNTER — EMERGENCY (EMERGENCY)
Facility: HOSPITAL | Age: 86
LOS: 1 days | Discharge: HOME | End: 2021-10-29
Attending: EMERGENCY MEDICINE
Payer: MEDICARE

## 2021-10-29 VITALS
SYSTOLIC BLOOD PRESSURE: 125 MMHG | OXYGEN SATURATION: 98 % | HEART RATE: 74 BPM | DIASTOLIC BLOOD PRESSURE: 71 MMHG | RESPIRATION RATE: 16 BRPM | TEMPERATURE: 98 F

## 2021-10-29 VITALS
SYSTOLIC BLOOD PRESSURE: 170 MMHG | OXYGEN SATURATION: 100 % | HEART RATE: 70 BPM | HEIGHT: 60 IN | WEIGHT: 111.99 LBS | DIASTOLIC BLOOD PRESSURE: 80 MMHG | RESPIRATION RATE: 16 BRPM

## 2021-10-29 DIAGNOSIS — K43.9 VENTRAL HERNIA WITHOUT OBSTRUCTION OR GANGRENE: ICD-10-CM

## 2021-10-29 LAB
ALBUMIN SERPL ELPH-MCNC: 4.3 G/DL — SIGNIFICANT CHANGE UP (ref 3.3–5)
ALP SERPL-CCNC: 70 U/L — SIGNIFICANT CHANGE UP (ref 40–120)
ALT FLD-CCNC: 12 U/L — SIGNIFICANT CHANGE UP (ref 10–45)
ANION GAP SERPL CALC-SCNC: 13 MMOL/L — SIGNIFICANT CHANGE UP (ref 5–17)
ANISOCYTOSIS BLD QL: SLIGHT — SIGNIFICANT CHANGE UP
AST SERPL-CCNC: 21 U/L — SIGNIFICANT CHANGE UP (ref 10–40)
BASOPHILS # BLD AUTO: 0 K/UL — SIGNIFICANT CHANGE UP (ref 0–0.2)
BASOPHILS NFR BLD AUTO: 0 % — SIGNIFICANT CHANGE UP (ref 0–2)
BILIRUB SERPL-MCNC: 0.7 MG/DL — SIGNIFICANT CHANGE UP (ref 0.2–1.2)
BUN SERPL-MCNC: 14 MG/DL — SIGNIFICANT CHANGE UP (ref 7–23)
CALCIUM SERPL-MCNC: 9.3 MG/DL — SIGNIFICANT CHANGE UP (ref 8.4–10.5)
CHLORIDE SERPL-SCNC: 105 MMOL/L — SIGNIFICANT CHANGE UP (ref 96–108)
CO2 SERPL-SCNC: 23 MMOL/L — SIGNIFICANT CHANGE UP (ref 22–31)
CREAT SERPL-MCNC: 0.48 MG/DL — LOW (ref 0.5–1.3)
DACRYOCYTES BLD QL SMEAR: SLIGHT — SIGNIFICANT CHANGE UP
ELLIPTOCYTES BLD QL SMEAR: SLIGHT — SIGNIFICANT CHANGE UP
EOSINOPHIL # BLD AUTO: 0 K/UL — SIGNIFICANT CHANGE UP (ref 0–0.5)
EOSINOPHIL NFR BLD AUTO: 0 % — SIGNIFICANT CHANGE UP (ref 0–6)
GLUCOSE SERPL-MCNC: 103 MG/DL — HIGH (ref 70–99)
HCT VFR BLD CALC: 37.4 % — SIGNIFICANT CHANGE UP (ref 34.5–45)
HGB BLD-MCNC: 11.3 G/DL — LOW (ref 11.5–15.5)
LYMPHOCYTES # BLD AUTO: 0.68 K/UL — LOW (ref 1–3.3)
LYMPHOCYTES # BLD AUTO: 10 % — LOW (ref 13–44)
MANUAL SMEAR VERIFICATION: SIGNIFICANT CHANGE UP
MCHC RBC-ENTMCNC: 18.2 PG — LOW (ref 27–34)
MCHC RBC-ENTMCNC: 30.2 GM/DL — LOW (ref 32–36)
MCV RBC AUTO: 60.1 FL — LOW (ref 80–100)
MONOCYTES # BLD AUTO: 0.31 K/UL — SIGNIFICANT CHANGE UP (ref 0–0.9)
MONOCYTES NFR BLD AUTO: 4.5 % — SIGNIFICANT CHANGE UP (ref 2–14)
NEUTROPHILS # BLD AUTO: 5.83 K/UL — SIGNIFICANT CHANGE UP (ref 1.8–7.4)
NEUTROPHILS NFR BLD AUTO: 85.5 % — HIGH (ref 43–77)
PLAT MORPH BLD: NORMAL — SIGNIFICANT CHANGE UP
PLATELET # BLD AUTO: 203 K/UL — SIGNIFICANT CHANGE UP (ref 150–400)
POIKILOCYTOSIS BLD QL AUTO: SIGNIFICANT CHANGE UP
POTASSIUM SERPL-MCNC: 3.8 MMOL/L — SIGNIFICANT CHANGE UP (ref 3.5–5.3)
POTASSIUM SERPL-SCNC: 3.8 MMOL/L — SIGNIFICANT CHANGE UP (ref 3.5–5.3)
PROT SERPL-MCNC: 7.4 G/DL — SIGNIFICANT CHANGE UP (ref 6–8.3)
RBC # BLD: 6.22 M/UL — HIGH (ref 3.8–5.2)
RBC # FLD: 18.3 % — HIGH (ref 10.3–14.5)
RBC BLD AUTO: ABNORMAL
SODIUM SERPL-SCNC: 141 MMOL/L — SIGNIFICANT CHANGE UP (ref 135–145)
TARGETS BLD QL SMEAR: SLIGHT — SIGNIFICANT CHANGE UP
WBC # BLD: 6.82 K/UL — SIGNIFICANT CHANGE UP (ref 3.8–10.5)
WBC # FLD AUTO: 6.82 K/UL — SIGNIFICANT CHANGE UP (ref 3.8–10.5)

## 2021-10-29 PROCEDURE — 70486 CT MAXILLOFACIAL W/O DYE: CPT | Mod: 26,MA

## 2021-10-29 PROCEDURE — 76377 3D RENDER W/INTRP POSTPROCES: CPT

## 2021-10-29 PROCEDURE — 80053 COMPREHEN METABOLIC PANEL: CPT

## 2021-10-29 PROCEDURE — 73070 X-RAY EXAM OF ELBOW: CPT | Mod: 26,RT,76

## 2021-10-29 PROCEDURE — 70450 CT HEAD/BRAIN W/O DYE: CPT | Mod: 26,MA

## 2021-10-29 PROCEDURE — 70450 CT HEAD/BRAIN W/O DYE: CPT | Mod: MA

## 2021-10-29 PROCEDURE — 99284 EMERGENCY DEPT VISIT MOD MDM: CPT | Mod: 25

## 2021-10-29 PROCEDURE — 76377 3D RENDER W/INTRP POSTPROCES: CPT | Mod: 26

## 2021-10-29 PROCEDURE — 73060 X-RAY EXAM OF HUMERUS: CPT | Mod: 26,RT

## 2021-10-29 PROCEDURE — 29130 APPL FINGER SPLINT STATIC: CPT

## 2021-10-29 PROCEDURE — 74177 CT ABD & PELVIS W/CONTRAST: CPT | Mod: 26,MA

## 2021-10-29 PROCEDURE — 29130 APPL FINGER SPLINT STATIC: CPT | Mod: F4

## 2021-10-29 PROCEDURE — 73130 X-RAY EXAM OF HAND: CPT

## 2021-10-29 PROCEDURE — 70486 CT MAXILLOFACIAL W/O DYE: CPT | Mod: MA

## 2021-10-29 PROCEDURE — 36415 COLL VENOUS BLD VENIPUNCTURE: CPT

## 2021-10-29 PROCEDURE — 73060 X-RAY EXAM OF HUMERUS: CPT

## 2021-10-29 PROCEDURE — 85025 COMPLETE CBC W/AUTO DIFF WBC: CPT

## 2021-10-29 PROCEDURE — 73130 X-RAY EXAM OF HAND: CPT | Mod: 26,LT

## 2021-10-29 PROCEDURE — 73090 X-RAY EXAM OF FOREARM: CPT | Mod: 26,LT

## 2021-10-29 PROCEDURE — 74177 CT ABD & PELVIS W/CONTRAST: CPT | Mod: MA

## 2021-10-29 PROCEDURE — 73090 X-RAY EXAM OF FOREARM: CPT

## 2021-10-29 PROCEDURE — 73070 X-RAY EXAM OF ELBOW: CPT

## 2021-10-29 RX ORDER — ACETAMINOPHEN 500 MG
975 TABLET ORAL ONCE
Refills: 0 | Status: COMPLETED | OUTPATIENT
Start: 2021-10-29 | End: 2021-10-29

## 2021-10-29 RX ADMIN — Medication 975 MILLIGRAM(S): at 15:09

## 2021-10-29 NOTE — ED PROVIDER NOTE - NSFOLLOWUPCLINICS_GEN_ALL_ED_FT
[facility]  General Surgery  [address] [place] [address] - 3rd [place] [address]  Phone: [phone]  Fax:   Follow Up Time: 1-3 Days

## 2021-10-29 NOTE — ED PROVIDER NOTE - MUSCULOSKELETAL MINIMAL EXAM
**ATTENDING ADDENDUM (Dr. Nate Chang): POSITIVE limited range of motion of the right elbow (any attempted extension or flexion elicits pain). POSITIVE pain with palpation of the posterior aspect of the elbow (olecranon)./RANGE OF MOTION LIMITED/neck supple/motor intact/TENDERNESS

## 2021-10-29 NOTE — ED PROVIDER NOTE - PHYSICAL EXAMINATION
**ATTENDING ADDENDUM (Dr. Nate Chang): I have reviewed and substantially contributed to the elements of the PE as documented above. I have directly performed an examination of this patient in conjunction with the other members (EM resident/PA/NP) of the patient care team. I have personally reviewed the patient's vital signs at the time of the patient's initial presentation to the ED and repeatedly throughout the ED course. A&Ox3, NAD. Well appearing for age  NCAT. PERRL, EOMI. NO septal hematoma, no periorbital or facial ttp.   Neck supple, no LAD.   Lungs CTAB. No w/r/r  Cardiac +S1S2, RRR, No m/r/g.   Abd soft, NT/ND, +BS, no rebound or guarding.   Extremities: + ttp of R. elbow with effusion, unable to extend arm fully 2/2 pain. NO sternal ttp no midline c/t/l vertebral ttp , hips stable with full strength. cap refill <2, pulses in distal extremities 4+, no edema. sensations intact and motor strength of distal R. arm/hand intact. able to supinate/pronate the r. arm.   Skin without rash.   CN II-XII intact. Strength 5/5 UE/LE. Sensations intact throughout. Gait steady.     **ATTENDING ADDENDUM (Dr. Nate Chang): I have reviewed and substantially contributed to the elements of the PE as documented above. I have directly performed an examination of this patient in conjunction with the other members (EM resident/PA/NP) of the patient care team. I have personally reviewed the patient's vital signs at the time of the patient's initial presentation to the ED and repeatedly throughout the ED course. A&Ox3, NAD. Well appearing for age  NCAT. PERRL, EOMI. NO septal hematoma, no periorbital or facial ttp.   Neck supple, no LAD.   Lungs CTAB. No w/r/r  Cardiac +S1S2, RRR, No m/r/g.   Abd soft, +RLQ ttp, otherwise nontender, nondistended, +BS, no rebound or guarding.   Extremities: + ttp of R. elbow with effusion, unable to extend arm fully 2/2 pain. NO sternal ttp no midline c/t/l vertebral ttp , hips stable with full strength. cap refill <2, pulses in distal extremities 4+, no edema. sensations intact and motor strength of distal R. arm/hand intact. able to supinate/pronate the r. arm.   Skin without rash.   CN II-XII intact. Strength 5/5 UE/LE. Sensations intact throughout. Gait steady.     **ATTENDING ADDENDUM (Dr. Nate Chang): I have reviewed and substantially contributed to the elements of the PE as documented above. I have directly performed an examination of this patient in conjunction with the other members (EM resident/PA/NP) of the patient care team. I have personally reviewed the patient's vital signs at the time of the patient's initial presentation to the ED and repeatedly throughout the ED course. A&Ox3, NAD. Well appearing for age  NCAT. PERRL, EOMI. NO septal hematoma, no periorbital or facial ttp.   Neck supple, no LAD.   Lungs CTAB. No w/r/r  Cardiac +S1S2, RRR, No m/r/g.   Abd soft, +RLQ ttp, otherwise nontender, nondistended, +BS, no rebound or guarding.   Extremities: + ttp of R. elbow with effusion, unable to extend arm fully 2/2 pain. NO sternal ttp no midline c/t/l vertebral ttp , hips stable with full strength. cap refill <2, pulses in distal extremities 4+, no edema. sensations intact and motor strength of distal R. arm/hand intact. able to supinate/pronate the r. arm.   Skin without rash.   CN II-XII intact. Strength 5/5 UE/LE. Sensations intact throughout. Gait steady.   **ATTENDING ADDENDUM (Dr. Nate Chang): I have reviewed and substantially contributed to the elements of the PE as documented above. I have directly performed an examination of this patient in conjunction with the other members (EM resident/PA/NP) of the patient care team. I have personally reviewed the patient's vital signs at the time of the patient's initial presentation to the ED and repeatedly throughout the ED course. Of note, and in addition to the above, the AIRWAY is CLEAR. NO pooling of secretions, POSITIVE gag reflex, NO debris, ABLE TO SELF-PROTECT AIRWAY. POSITIVE full range of motion of the mandible. NO temporomandibular joint tenderness with range of motion or palpation. NO dental trauma. NO malocclusion. NO facial or nasal deformity, but POSITIVE bony tenderness over the nasal bones. NO active epistaxis or septal hematoma noted, but POSITIVE prior epistaxis noted.

## 2021-10-29 NOTE — ED ADULT NURSE NOTE - OBJECTIVE STATEMENT
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 and fell on face; pt denies LOC; 88y f pt arrived via ems; emt reports pt was walking out of [employer] store in parking lot tripped and fell on face and right arm; pt denies LOC; pt denies other pain or injuries; aox3; no resp distress; no chest pain; + abd pain; RLQ; pt was going to have outpatient ct scan of abd today because has been having pain and "theres a bulge and its very hard and painful"; no hematuria; pt urinates frequently; no n/v/d; denies fever/chills; no numbness/tingling; pt unable to hold right arm up due to pain; pt arrived with sling and cold pack on elbow; pt also arrived in c collar; pt denies neck pain; + perrl; + periph pulses; cap refil <2; safety/comfort maintained 88y f pt arrived via ems; emt reports pt was walking out of [employer] store in parking lot tripped and fell on face and right arm; pt denies LOC; pt c/o pain and bleeding to nose and pain r elbow; aox3; no resp distress; no chest pain; + abd pain; RLQ; pt was going to have outpatient ct scan of abd today because has been having pain and "theres a bulge and its very hard and painful"; no hematuria; pt urinates frequently; no n/v/d; denies fever/chills; no numbness/tingling; pt unable to hold right arm up due to pain; pt arrived with sling and cold pack on elbow; pt also arrived in c collar; pt denies neck pain; + perrl; + periph pulses; cap refil <2; safety/comfort maintained

## 2021-10-29 NOTE — ED PROVIDER NOTE - GENITOURINARY [+], MLM
**ATTENDING ADDENDUM (Dr. Nate Chang): POSITIVE history of breast cancer. POSITIVE history of frequent urinary tract infection.

## 2021-10-29 NOTE — ED PROVIDER NOTE - FAMILY DETAILS FREE TEXT FOR MDM ADDL HISTORY OBTAINED FROM QUESTION
**ATTENDING ADDENDUM (Dr. Nate Chang): family member(s) present during patient's ED visit; corroborated CC, HPI and review of systems as provided by patient.

## 2021-10-29 NOTE — ED PROVIDER NOTE - CARE PROVIDER_API CALL
Arun Nj)  Orthopedics  [address] [address]  Phone: [phone]  Fax: [phone]  Follow Up Time: 1-3 Days    Jaime Dykes)  Plastic Surgery; Surgery; Surgical Critical Care  [address] [place] [address]  Phone: [phone]  Fax: [phone]  Follow Up Time: 1-3 Days

## 2021-10-29 NOTE — ED PROVIDER NOTE - NS ED ROS FT
**ATTENDING ADDENDUM (Dr. Nate Chang): During my interview with the patient, I have personally obtained and/or have directly verified the elements in the past medical/surgical history and other histories as noted earlier in the EMR, in conjunction with the other members (EM resident/PA/NP) of the patient care team. I have also personally obtained and/or have directly verified/reviewed the review of systems as documented below, in conjunction with the other members (EM resident/PA/NP) of the patient care team. Constitutional: No fever or chills  Eyes: No visual changes, eye pain or redness  HEENT: No throat pain, ear pain, nasal pain. No nose bleeding.  CV: No chest pain or lower extremity edema  Resp: No SOB no cough  GI: see hpi  : No dysuria, hematuria.   MSK: see hpi  Skin: No rash  Neuro: No headache. No numbness or tingling. No weakness.     **ATTENDING ADDENDUM (Dr. Nate Chang): During my interview with the patient, I have personally obtained and/or have directly verified the elements in the past medical/surgical history and other histories as noted earlier in the EMR, in conjunction with the other members (EM resident/PA/NP) of the patient care team. I have also personally obtained and/or have directly verified/reviewed the review of systems as documented below, in conjunction with the other members (EM resident/PA/NP) of the patient care team. Constitutional: No fever or chills  Eyes: No visual changes, eye pain or redness  HEENT: No throat pain, ear pain, nasal pain. No nose bleeding.  CV: No chest pain or lower extremity edema  Resp: No SOB no cough  GI: see hpi  : No dysuria, hematuria.   MSK: see hpi  Skin: No rash  Neuro: No headache. No numbness or tingling. No weakness.   **ATTENDING ADDENDUM (Dr. Nate Chang): During my interview with the patient, I have personally obtained and/or have directly verified the elements in the past medical/surgical history and other histories as noted earlier in the EMR, in conjunction with the other members (EM resident/PA/NP) of the patient care team. I have also personally obtained and/or have directly verified/reviewed the review of systems as documented below, in conjunction with the other members (EM resident/PA/NP) of the patient care team.

## 2021-10-29 NOTE — ED PROVIDER NOTE - CARE PLAN
Goal:	**ATTENDING ADDENDUM (Dr. Nate Chang): Goals of care include resolution of emergent/urgent symptoms and concerns, and restoration to baseline level of homeostasis.   1 Principal Discharge DX:	Nasal fracture  Goal:	**ATTENDING ADDENDUM (Dr. Nate Chang): Goals of care include resolution of emergent/urgent symptoms and concerns, and restoration to baseline level of homeostasis.  Secondary Diagnosis:	Fracture of olecranon process, right, closed

## 2021-10-29 NOTE — ED PROVIDER NOTE - CHIEF COMPLAINT
The patient is a 88y Female complaining of  The patient is a 88-year-old woman with history of Hypertension and other medical conditions as noted in the EMR (takes aspirin and other medications as noted in the EMR) now presenting with concern for epistaxis and other injuries s/p mechanical trip and fall outside of [employer] shop. POSITIVE elbow pain and facial pain. NO loss of consciousness.

## 2021-10-29 NOTE — ED PROVIDER NOTE - PLAN OF CARE
**ATTENDING ADDENDUM (Dr. Nate Chang): Goals of care include resolution of emergent/urgent symptoms and concerns, and restoration to baseline level of homeostasis.

## 2021-10-29 NOTE — ED PROVIDER NOTE - PROGRESS NOTE DETAILS
**ATTENDING ADDENDUM (Dr. Nate Chang): personally assessed with KEN Ngo. Agree with goals/plan of ED care as described in EMR, including diagnostics, therapeutics and consultation as clinically warranted. Will continue to observe and monitor closely. Anticipatory guidance provided by ED team at time of initial presentation. **ATTENDING ADDENDUM (Dr. Nate Chang): patient serially evaluated throughout ED course by ED team. NO acute deterioration up to this time in the ED. ED diagnostics up to this time acknowledged, reviewed and noted. POSITIVE comminuted right olecranon fracture. Orthopedics present to evaluate patient. Will continue to observe and monitor closely. Anticipatory guidance provided. Discussed all result with patient and daughter at bedside, discussed nasal fracture, hernia and olecranon fracture management. discussed return precautions in detail, all questions answered. -Shelli Ngo PA-C Discussed all result with patient and daughter at bedside, discussed nasal fracture, hernia, ? left 5th metacarpal fx (pt with ecchymosis and point ttp of L. 5th metacarpal head ttp where read of ? old fx is noted) and olecranon fracture management. discussed return precautions in detail, all questions answered. Pt voiced concern of taking care of her self at home, offered pt admission for rehab placement which she declined, daughter will stay with pt to help her until she can have aid to help with adls. -Shelli Ngo PA-C Discussed all result with patient and daughter at bedside, discussed nasal fracture, hernia, ? left 5th metacarpal fx (pt with ecchymosis and point ttp of L. 5th metacarpal head ttp where read of ? old fx is noted) and olecranon fracture management. discussed return precautions in detail, all questions answered. Pt voiced concern of taking care of her self at home, offered pt admission for rehab placement which she declined, daughter will stay with pt to help her until she can have aid to help with adls. -Shelli Ngo PA-C  **ATTENDING ADDENDUM (Dr. Nate Chang): patient serially evaluated throughout ED course by ED team. NO acute deterioration up to this time in the ED. Agree with above notation by KEN Ngo. ED team reviewed goals/care with patient and family member(s) to develop patient-centered collaborative care plan. Patient and family prefer outpatient discharge to admission at this time. All ED diagnostics up to this time acknowledged, reviewed and noted. CT does not reveal actionable pathology requiring emergent consultation with general surgery at this time. Orthopedics involved in fracture care; recommendations and anticipatory guidance provided at time of discharge planning. Patient appears STABLE for discharge at this time.

## 2021-10-29 NOTE — ED PROVIDER NOTE - NEUROLOGICAL, MLM
Alert and oriented, no focal deficits, no motor or sensory deficits. **ATTENDING ADDENDUM (Dr. Nate Chang): Enola coma score = 15 (eyes =4, verbal =5, motor =6). NO posturing. NO focal motor weakness. NO sensory changes. Awake, alert, coherent, interactive, and oriented to person, place, and time.

## 2021-10-29 NOTE — ED PROVIDER NOTE - UPPER EXTREMITY EXAM, RIGHT
**ATTENDING ADDENDUM (Dr. Nate Chang): POSITIVE pain with attempted flexion and extension. POSITIVE able to supinate and pronate the right forearm. Hand grasp and motions of the fingers and hand of the right upper extremity are intact. NO bony tenderness of the shoulder, clavicle, humerus, distal radius, distal ulna, hand (carpals), metacarpals or phalanges of the right upper extremity./BRUISING/DEFORMITY/JOINT SWELLING/LIMITED ROM/SWELLING/TENDERNESS

## 2021-10-29 NOTE — ED ADULT NURSE NOTE - NSICDXPASTMEDICALHX_GEN_ALL_CORE_FT
PAST MEDICAL HISTORY:  Anxiety     Aortic stenosis     Asthma     Benign essential hypertension     Breast cancer     Central retinal vein occlusion of right eye     Hyperlipidemia     Hypothyroid     Recurrent UTI     Uterine prolapse

## 2021-10-29 NOTE — ED PROVIDER NOTE - PROVIDER TOKENS
PROVIDER:[TOKEN:[[id number]],FOLLOWUP:[1-3 Days]],PROVIDER:[TOKEN:[[id number]],FOLLOWUP:[1-3 Days]]

## 2021-10-29 NOTE — ED PROVIDER NOTE - EYES, MLM
Clear bilaterally, pupils equal, round and reactive to light. **ATTENDING ADDENDUM (Dr. Nate Chang): Extraocular muscle movements intact. Clear corneas bilaterally, pupils equal and round. NO nystagmus. NO orbital rim tenderness bilaterally with palpation.

## 2021-10-29 NOTE — ED PROVIDER NOTE - RESPIRATORY, MLM
Breath sounds clear and equal bilaterally. **ATTENDING ADDENDUM (Dr. Nate Chang): BREATHING CLEAR. POSITIVE BILATERAL BREATH SOUNDS auscultated. NO stridor, drooling, tripoding, or wheezing. POSITIVE bilateral chest wall expansion WITHOUT crepitus, tenderness, or deformity. POSITIVE midline trachea.

## 2021-10-29 NOTE — ED PROVIDER NOTE - PATIENT PORTAL LINK FT
You can access the FollowMyHealth Patient Portal offered by [facility] by registering at the following website: [email or web address]. By joining [employer]’s FollowMyHealth portal, you will also be able to view your health information using other applications (apps) compatible with our system.

## 2021-10-29 NOTE — ED PROVIDER NOTE - MUSCULOSKELETAL NECK EXAM
**ATTENDING ADDENDUM (Dr. Nate Chang): NO obvious cervical-thoracic-lumbar-sacral midline bony tenderness or stepoff with palpation./supple/trachea midline

## 2021-10-29 NOTE — ED PROVIDER NOTE - ATTENDING CONTRIBUTION TO CARE
**ATTENDING ADDENDUM (Dr. Nate Chang): I attest that I have directly examined this patient and reviewed and formulated the diagnostic and therapeutic management plan in collaboration with the EM resident. Please see MDM note and remainder of EMR for findings from CC, HPI, ROS, and PE. (Shameem) **ATTENDING ADDENDUM (Dr. Nate Chang): I attest that I have directly examined this patient and reviewed and formulated the diagnostic and therapeutic management plan in collaboration with the EM resident. Please see MDM note and remainder of EMR for findings from CC, HPI, ROS, and PE. (Mayram)  **ATTENDING ADDENDUM (Dr. Nate Chang): I attest that I have directly examined this patient and elicited a comparable history of present illness and review of systems with my collaborating provider (NP/PA). I attest that I have made significant contributions to the documentation where necessary and as noted in the EMR. (Jeni)

## 2021-10-29 NOTE — ED PROVIDER NOTE - NSFOLLOWUPINSTRUCTIONS_ED_ALL_ED_FT
- stay hydrated.   - take tylenol 975mg every 6 hours as needed for pain-take with meals.  -keep arm elevated, ice throughout the day.   -Follow up with  as outpatient early next week, call office for appointment.  - return if symptoms worsen, fever, weakness, numbness/tingling, blurred vision, difficulty ambulating and all other concerns. - stay hydrated.   - take tylenol 975mg every 6 hours as needed for pain-take with meals.  -keep arm elevated, ice throughout the day.   -Follow up with orthopedics doctor for management of your elbow. See Dr. Soni as outpatient early next week, call office for appointment.  -Follow up with the surgery clinic this week for management of your Hernia. Call the number above  -Follow up with plastic surgery for management of your broken  nose if you feel it needs to be corrected. call number above.   - return if symptoms worsen, fever, weakness, numbness/tingling, blurred vision, difficulty ambulating, you have worsening abdominal pain, you are unable to eat/drink and all other concerns.

## 2021-10-29 NOTE — ED PROVIDER NOTE - NOSE FINDINGS
**ATTENDING ADDENDUM (Dr. Nate Chang): POSITIVE epistaxis resolved. POSITIVE mild bony ridge tenderness of the nasal bones./CLOTTED NASAL BLOOD/EPISTAXIS

## 2021-10-29 NOTE — ED ADULT NURSE NOTE - NSIMPLEMENTINTERV_GEN_ALL_ED
Implemented All Fall Risk Interventions:  [place] to call system. Call bell, personal items and telephone within reach. Instruct patient to call for assistance. Room bathroom lighting operational. Non-slip footwear when patient is off stretcher. Physically safe environment: no spills, clutter or unnecessary equipment. Stretcher in lowest position, wheels locked, appropriate side rails in place. Provide visual cue, wrist band, yellow gown, etc. Monitor gait and stability. Monitor for mental status changes and reorient to person, place, and time. Review medications for side effects contributing to fall risk. Reinforce activity limits and safety measures with patient and family.

## 2021-10-29 NOTE — ED PROVIDER NOTE - LOCATION
elbow **ATTENDING ADDENDUM (Dr. Nate Chang): POSITIVE epistaxis (resolved spontaneously with time)./face

## 2021-10-29 NOTE — ED PROVIDER NOTE - OBJECTIVE STATEMENT
87 yo female with pmh htn on baby aspirin here for c/o fall with nose bleeding this morning. Pt states she tripped on the concrete outside of a bagel shop and fell onto her right elbow and her face. No loc, needed assistance getting up but able to ambulate afterwards with steady gait. She experienced nasal bleeding for a few minutes however ceased on its own. Denies numbness, tinglgin, weakness, CP, sob, difficulty breathing, no hip or leg pain. Of note, pt also was scheduled to have out pt CT of the abdomen today as she has been having 2 days of RLQ abd pain and "bulging" sensation. No fevers, chills, nausea, vomiting, dysuria, hematuria, no constipation. 87 yo female with pmh htn on baby aspirin here for c/o fall with nose bleeding this morning. Pt states she tripped on the concrete outside of a bagel shop and fell onto her right elbow and her face. No loc, needed assistance getting up but able to ambulate afterwards with steady gait. She experienced nasal bleeding for a few minutes however ceased on its own. Denies numbness, tinglgin, weakness, CP, sob, difficulty breathing, no hip or leg pain. Of note, pt also was scheduled to have out pt CT of the abdomen today as she has been having 2 days of RLQ abd pain and "bulging" sensation. No fevers, chills, nausea, vomiting, dysuria, hematuria, no constipation.  pt currently declining need for pain medications. 89 yo female with pmh htn on baby aspirin here for c/o fall with nose bleeding this morning. Pt states she tripped on the concrete outside of a bagel shop and fell onto her right elbow and her face. No loc, needed assistance getting up but able to ambulate afterwards with steady gait. She experienced nasal bleeding for a few minutes however ceased on its own. Denies numbness, tingling, weakness, CP, sob, difficulty breathing, no hip or leg pain. Of note, pt also was scheduled to have out pt CT of the abdomen today as she has been having 2 days of RLQ abd pain and "bulging" sensation. No fevers, chills, nausea, vomiting, dysuria, hematuria, no constipation.  pt currently declining need for pain medications.  **ATTENDING ADDENDUM (Dr. Nate Chang): I attest that I have directly examined this patient and elicited a comparable history of present illness and review of systems with my collaborating provider (NP/PA). I attest that I have made significant contributions to the documentation where necessary and as noted in the EMR.

## 2021-10-29 NOTE — ED PROVIDER NOTE - CLINICAL SUMMARY MEDICAL DECISION MAKING FREE TEXT BOX
**ATTENDING MEDICAL DECISION MAKING/SYNTHESIS (Dr. Nate Chang): I have reviewed the Chief Concern(s), the HPI, the ROS, and have directly performed and confirmed the findings on the Physical Examination. I have reviewed the medical decision making with all providers, as applicable. The PROBLEM REPRESENTATION at this time is: 88-year-old woman with past medical/surgical history as noted in the EMR now presenting s/p mechanical fall with nose pain and with right elbow and olecranon pain/tenderness. The MOST LIKELY DIAGNOSIS, and the LIST OF DIFFERENTIAL DIAGNOSES, includes (but is not limited to) the following: SEQUELA OF FALL: cord/spine injury, intracerebral hemorrhage, intra-thoracic hemorrhage (hemothorax), pneumothorax, rib fracture(s) or flail chest, intra-abdominal hemorrhage (hemoperitoneum), pelvis or other extremity injury, hemoperitoneum, concussion, contusions, lacerations, sprain/strain, facial bone fractures, CAUSE FOR FALL OTHER THAN MECHANICAL (unlikely): arrhythmia, dysequilibrium, cerebrovascular accident, transient ischemic attack, acute coronary syndrome, syncope, near-syncope, vasovagal syndrome, dehydration, electrolyte-metabolic-endocrine derangements, anemia, deconditioning, dysequilibrium, orthostasis, POTS. The likelihood of each of these diagnoses has been appropriately considered in the context of this patient's presentation and my evaluation. PLAN: as described in EMR, including diagnostics, therapeutics and consultation as clinically warranted. I will continue to reevaluate the patient, including the results of all testing, and monitor response to therapy throughout the patient's course in the ED. **ATTENDING MEDICAL DECISION MAKING/SYNTHESIS (Dr. Nate Chang): I have reviewed the Chief Concern(s), the HPI, the ROS, and have directly performed and confirmed the findings on the Physical Examination. I have reviewed the medical decision making with all providers, as applicable. The PROBLEM REPRESENTATION at this time is: 88-year-old woman with past medical/surgical history as noted in the EMR now presenting s/p mechanical fall with nose pain and with right elbow and olecranon pain/tenderness. The MOST LIKELY DIAGNOSIS, and the LIST OF DIFFERENTIAL DIAGNOSES, includes (but is not limited to) the following: SEQUELA OF FALL: cord/spine injury, intracerebral hemorrhage, intra-thoracic hemorrhage (hemothorax), pneumothorax, rib fracture(s) or flail chest, intra-abdominal hemorrhage (hemoperitoneum), pelvis or other extremity injury, hemoperitoneum, concussion, contusions, lacerations, sprain/strain, facial bone fractures, CAUSE FOR FALL OTHER THAN MECHANICAL (unlikely): arrhythmia, dysequilibrium, cerebrovascular accident, transient ischemic attack, acute coronary syndrome, syncope, near-syncope, vasovagal syndrome, dehydration, electrolyte-metabolic-endocrine derangements, anemia, deconditioning, dysequilibrium, orthostasis, POTS. RLQ ABDOMINAL PAIN (causing need for outpatient CT): acute appendicitis, acute biliary disease (e.g. cholelithiasis, cholecystitis, or cholangitis), acute intussusception, volvulus, hernia (internal or external, inguinal v. spigelian v. femoral, with or without associated incarceration, strangulation or equivalent), small bowel obstruction, large bowel obstruction, mass/tumor, acute/subacute/chronic vascular pathology e.g. AAA, dissection, mesenteric ischemia, colonic ischemia, colitis/dierticulitis, other serious bacterial infection or sepsis/severe sepsis e.g. urinary tract infection, pyelonephritis, or equivalent, other gynecologic or urologic cause e.g. obstructing ureteral stone, ovarian pathology or equivalent.  The likelihood of each of these diagnoses has been appropriately considered in the context of this patient's presentation and my evaluation. PLAN: as described in EMR, including diagnostics, therapeutics and consultation as clinically warranted. I will continue to reevaluate the patient, including the results of all testing, and monitor response to therapy throughout the patient's course in the ED.

## 2021-10-29 NOTE — ED PROVIDER NOTE - ABDOMINAL EXAM
**ATTENDING ADDENDUM (Dr. Nate Chang): POSITIVE mild right lower quadrant and mild suprapubic ache with palpation. NO guarding, rebound, or rigidity. NO pulsatile or non-pulsatile masses. NO hernias. NO obvious hepatosplenomegaly./soft/tender.../nondistended/no organomegaly

## 2021-10-31 ENCOUNTER — EMERGENCY (EMERGENCY)
Facility: HOSPITAL | Age: 86
LOS: 1 days | Discharge: HOME | End: 2021-10-31
Attending: STUDENT IN AN ORGANIZED HEALTH CARE EDUCATION/TRAINING PROGRAM
Payer: MEDICARE

## 2021-10-31 VITALS
OXYGEN SATURATION: 96 % | RESPIRATION RATE: 18 BRPM | DIASTOLIC BLOOD PRESSURE: 68 MMHG | HEART RATE: 72 BPM | SYSTOLIC BLOOD PRESSURE: 147 MMHG | TEMPERATURE: 98 F

## 2021-10-31 VITALS
DIASTOLIC BLOOD PRESSURE: 62 MMHG | HEIGHT: 60 IN | RESPIRATION RATE: 97 BRPM | TEMPERATURE: 98 F | SYSTOLIC BLOOD PRESSURE: 137 MMHG | OXYGEN SATURATION: 97 % | WEIGHT: 113.98 LBS | HEART RATE: 75 BPM

## 2021-10-31 LAB — D DIMER BLD IA.RAPID-MCNC: 403 NG/ML DDU — HIGH

## 2021-10-31 PROCEDURE — 93005 ELECTROCARDIOGRAM TRACING: CPT

## 2021-10-31 PROCEDURE — 93971 EXTREMITY STUDY: CPT

## 2021-10-31 PROCEDURE — 93010 ELECTROCARDIOGRAM REPORT: CPT | Mod: GC

## 2021-10-31 PROCEDURE — 85379 FIBRIN DEGRADATION QUANT: CPT

## 2021-10-31 PROCEDURE — 99284 EMERGENCY DEPT VISIT MOD MDM: CPT | Mod: 25

## 2021-10-31 PROCEDURE — 99285 EMERGENCY DEPT VISIT HI MDM: CPT | Mod: GC

## 2021-10-31 PROCEDURE — 93971 EXTREMITY STUDY: CPT | Mod: 26,RT

## 2021-10-31 RX ORDER — ACETAMINOPHEN 500 MG
975 TABLET ORAL ONCE
Refills: 0 | Status: COMPLETED | OUTPATIENT
Start: 2021-10-31 | End: 2021-10-31

## 2021-10-31 RX ADMIN — Medication 975 MILLIGRAM(S): at 16:10

## 2021-10-31 NOTE — ED ADULT NURSE NOTE - OBJECTIVE STATEMENT
88 female with PMH recurrent right elbow fracture sp fall, asthma, breast cancer sp lumpectomy, aortic stenosis, HLD, hypothyroidism, anxiety, HTN, UTI, & uterine prolapse,   via walk in presenting with complaints of arm pain. As per patient, pt fell 2 days ago, and had a right olecranon fracture, where cast was placed. Pt states that she has had 10/10 pain to the right arm, under shoulder, feeling like the cast is too tight, "stopping circulation," and resulting in "pinchy pinchy" & numbness feeling,  associated with hand swelling. Pt denies any falls, trauma, or other injuries after initial fall. Pt denies chest pain, palpitations, shortness of breath, headache, visual disturbances, fever, chills, diaphoresis,  nausea, vomiting, constipation, diarrhea, or urinary symptoms.   Pt Axox4, gross neuro intact, PERRL 3 mm. Lungs clear throughout bilateral, respirations even, & non-labored. S1S2 heard, pulses strong and equal bilaterally. Abdomen soft, non-tender, non-distended. RIGHT HAND SWELLING, abrasion to the right elbow. Pt placed in position of comfort. Pt educated on call bell system and provided call bell. Bed in lowest position, wheels locked, appropriate side rails raised. Pt denies needs at this time.

## 2021-10-31 NOTE — ED PROVIDER NOTE - OBJECTIVE STATEMENT
89 yo F pmhx htn, s/p mechanical fall 2 days ago found to have right olecranon fracture, presents to the ED c/o numbness, paresthesias, and swelling to the right upper arm. she states it feels like it is too tight. she denies other trauma/injury to the arm. she denies cp, sob, fever, n/v/d.

## 2021-10-31 NOTE — ED ADULT NURSE REASSESSMENT NOTE - NS ED NURSE REASSESS COMMENT FT1
spoke to US tech - states they have two patients there now and will put in for transport up to US after the other two from the ED.

## 2021-10-31 NOTE — ED PROVIDER NOTE - NS ED ROS FT
Constitutional: no fevers; no chills  HEENT: no visual changes, no sore throat, no rhinorrhea  CV: no cp; no palpitations  Resp: no sob; no cough  GI: no abd pain, no nausea, no vomiting, no diarrhea, no constipation  : no dysuria, no hematuria  MSK: myalgias; arthralgias  skin: no rashes  neuro: no HA, no numbness; no weakness, no tingling  ROS statement: all other ROS negative except as per HPI

## 2021-10-31 NOTE — ED PROVIDER NOTE - PATIENT PORTAL LINK FT
You can access the FollowMyHealth Patient Portal offered by [facility] by registering at the following website: [email or web address]. By joining [facility]’s FollowMyHealth portal, you will also be able to view your health information using other applications (apps) compatible with our system.

## 2021-10-31 NOTE — ED ADULT NURSE REASSESSMENT NOTE - NS ED NURSE REASSESS COMMENT FT1
ED Resident LIBRADO lAfred MD placed cast on patient. Pt states that she feels comfortable with cast placed.

## 2021-10-31 NOTE — ED PROVIDER NOTE - PHYSICAL EXAMINATION
PHYSICAL EXAM:  GENERAL: non-toxic appearing; in no respiratory distress  HEAD Atraumatic, Normocephalic  NECK: No JVD; trachea midline  EYES: PERRL, EOMs intact b/l w/out deficits; normal conjunctiva  CHEST/LUNG: CTAB no wheezes/rhonchi/rales  HEART: RRR no murmur/gallops/rubs  ABDOMEN: +BS, soft, NT, ND  EXTREMITIES: No LE edema, +2 radial pulses b/l, +2 DP/PT pulses b/l  MUSCULOSKELETAL: the RUE is in a splint; it is swollen; cap refill < 2 seconds but mildly cooler to touch;   NERVOUS SYSTEM:  A&Ox3, No motor deficits or sensory deficits; CNII-XII intact; no focal neurologic deficits  SKIN:  Warm and dry as visualized PHYSICAL EXAM:  GENERAL: non-toxic appearing; in no respiratory distress  HEAD Atraumatic, Normocephalic  NECK: No JVD; trachea midline  EYES: PERRL, EOMs intact b/l w/out deficits; normal conjunctiva  CHEST/LUNG: CTAB no wheezes/rhonchi/rales  HEART: RRR no murmur/gallops/rubs  ABDOMEN: +BS, soft, NT, ND  EXTREMITIES: No LE edema, +2 radial pulses b/l  MUSCULOSKELETAL: RUE splint taken down, hand and forearm swollen and cooler compared to contralateral hand, cap refill < 2 seconds  NERVOUS SYSTEM:  A&Ox3, sensation itnact in RUE. Strength exam limited by pain.   SKIN:  Warm and dry aside from above.

## 2021-10-31 NOTE — ED ADULT NURSE REASSESSMENT NOTE - NS ED NURSE REASSESS COMMENT FT1
US tech contacted regarding US schedule, pt very anxious and concerned about timeline of plan of care. US tech states they are ready for patient, and "put in for transport." No available transport team members available to transport patient to US. RN transported pt to US in order to speed up plan of care. Pt instructed to keep all belongings with patient.

## 2021-10-31 NOTE — ED ADULT NURSE REASSESSMENT NOTE - NS ED NURSE REASSESS COMMENT FT1
Pt transport team at bedside, however pt states she has to urinate. Pt assisted to bathroom. Pt assisted back to stretcher. Pt placed in position of comfort with right upper extremity elevated and pt provided warm blankets. Pt's d-dimer obtained and sent to laboratory. Pt awaiting transport to US.

## 2021-10-31 NOTE — ED ADULT NURSE REASSESSMENT NOTE - NS ED NURSE REASSESS COMMENT FT1
ED attending NOLA Wilson and ED Resident LIBRADO lAfred removed cast, and right upper extremity elevated. Pt reports feeling improved.

## 2021-10-31 NOTE — ED PROVIDER NOTE - PROGRESS NOTE DETAILS
Home Alfred MD. cast removed, pt sx improving. pending d-dimer Home Alfred MD. cast removed, pt sx improving. pending d-dimer; if positive, will pursue US Home Alfred MD. there was delay in the US as there was transportation issues, even for pt's return. Home Alfred MD. US technician brought the pt from US down back to the ED. no DVT. ortho cleared for dc. pt was placed back in the plaster splint with 1) webroll, 2) plaster cast, 3) ace bandage. advised pt to f/u with her doctors including her orthopedic surgeon. advised to elevate the right arm when sheh sleeps at night. Return precautions provided. Allowed for questions and all questions answered. Pt to be discharged.

## 2021-10-31 NOTE — ED PROVIDER NOTE - CLINICAL SUMMARY MEDICAL DECISION MAKING FREE TEXT BOX
Home Alfred MD. pt s/p mechanical fall 2 days ago found to have olecranon fracture of right extremity s/p splint by ortho, presents to ed c/o numbness, paresthesias, pain. low concern for compartment syndrome; cap refill < 2 seconds. will speak with ortho re: cast removal. will obtain d-dimer and if negative, will cancel dvt study. Home Alfred MD. pt s/p mechanical fall 2 days ago found to have olecranon fracture of right extremity s/p splint by ortho, presents to ed c/o numbness, paresthesias, pain. low concern for compartment syndrome as compartments are soft, patinet not in pain once splint taken down; cap refill < 2 seconds. will speak with ortho re: cast removal. will obtain d-dimer and if negative, will cancel dvt study.    Attending MD Wilson: Agree with above.

## 2021-10-31 NOTE — ED PROVIDER NOTE - NSFOLLOWUPINSTRUCTIONS_ED_ALL_ED_FT
You had an ultrasound of your right arm which did NOT show a blood clot. The swelling is likely from the fracture. Thus, Please keep your arm elevated at night time. When you sleep, put a pillow underneath your arm.    Please follow up with your orthopedic surgeon. Please call to make an appointment.     Please do not bear weight with your right arm until you see the orthopedic doctor.    You may take Acetaminophen over the counter as needed for pain and/or fever. Use as directed and see medication warnings.  You may take Ibuprofen over the counter as needed for pain and/or fever. Use as directed and see medication warnings.    Please bring a copy of your results with you.  Please return to the emergency department for worsening of your symptoms.

## 2021-11-03 ENCOUNTER — APPOINTMENT (OUTPATIENT)
Dept: UROGYNECOLOGY | Facility: CLINIC | Age: 86
End: 2021-11-03

## 2021-11-10 ENCOUNTER — NON-APPOINTMENT (OUTPATIENT)
Age: 86
End: 2021-11-10

## 2021-11-12 ENCOUNTER — APPOINTMENT (OUTPATIENT)
Dept: UROGYNECOLOGY | Facility: CLINIC | Age: 86
End: 2021-11-12
Payer: MEDICARE

## 2021-11-12 VITALS — TEMPERATURE: 97.1 F

## 2021-11-12 DIAGNOSIS — N81.12 CYSTOCELE, LATERAL: ICD-10-CM

## 2021-11-12 DIAGNOSIS — N95.2 POSTMENOPAUSAL ATROPHIC VAGINITIS: ICD-10-CM

## 2021-11-12 PROCEDURE — 99213 OFFICE O/P EST LOW 20 MIN: CPT

## 2021-11-12 NOTE — PHYSICAL EXAM
[No Acute Distress] : in no acute distress [Well Nourished] : ~L well nourished [Good Hygeine] : demonstrates good hygeine [Oriented x3] : oriented to person, place, and time [Normal Memory] : ~T memory was ~L unimpaired [Normal Mood/Affect] : mood and affect are normal [Soft, Nontender] : the abdomen was soft and nontender [Warm and Dry] : was warm and dry to touch [Normal Gait] : gait was normal [Vulvar Atrophy] : vulvar atrophy [Atrophy] : atrophy [Cystocele] : a cystocele [Discharge] : a  ~M vaginal discharge was present [Scant] : scant [Christine] : yellow [No Bleeding] : there was no active vaginal bleeding [Labia Majora] : were normal [Labia Minora] : were normal

## 2021-11-12 NOTE — HISTORY OF PRESENT ILLNESS
[FreeTextEntry1] : Manasa, 89y/o presents for follow up pelvic prolapse.  Prolapse supported with Ring with support # 2.  PT doing well with pessary.  Denies any pelvic pain, pressure or vaginal bleeding.  Voiding and moving BM without problems.  \par PT on medication Methenamine to help with her recurrent UTI.  She is doing well, denies any recurrent UTI.\par Of note patient recently fell and broke her arm. She is staying with family in [place] until she is well enough to go back home.

## 2021-11-12 NOTE — PROCEDURE
[Good Fit] : fits well [Discharge] : there is vaginal discharge [Pessary Inserted] : inserted [Pessary Washed] : washed [H2O] : H2O [None] : no bleeding [Refit] : refit is not needed [Erosion] : no evidence of erosion [Erythema] : no erythema [Infection] : no evidence of infection [FreeTextEntry1] : Ring with support # 2 [FreeTextEntry8] : Reinforced daily pericare.

## 2021-11-12 NOTE — DISCUSSION/SUMMARY
[FreeTextEntry1] : Pelvic Prolapse:\par -Continue with Ring with support # 2.\par -Precaution and instruction were reviewed.\par \par Recurrent UTI:\par -Pt on Methenamine and will finish course.\par \par \par Will Follow up in 3 months or sooner if needed.  IF pt have any problems/concern to call office.  PT aware and agrees.

## 2021-12-01 ENCOUNTER — APPOINTMENT (OUTPATIENT)
Dept: CARDIOLOGY | Facility: CLINIC | Age: 86
End: 2021-12-01
Payer: MEDICARE

## 2021-12-01 ENCOUNTER — NON-APPOINTMENT (OUTPATIENT)
Age: 86
End: 2021-12-01

## 2021-12-01 ENCOUNTER — APPOINTMENT (OUTPATIENT)
Dept: PULMONOLOGY | Facility: CLINIC | Age: 86
End: 2021-12-01
Payer: MEDICARE

## 2021-12-01 VITALS
WEIGHT: 108 LBS | TEMPERATURE: 97.3 F | HEART RATE: 72 BPM | OXYGEN SATURATION: 98 % | SYSTOLIC BLOOD PRESSURE: 135 MMHG | BODY MASS INDEX: 21.77 KG/M2 | DIASTOLIC BLOOD PRESSURE: 60 MMHG | RESPIRATION RATE: 12 BRPM | HEIGHT: 59 IN

## 2021-12-01 PROCEDURE — 99214 OFFICE O/P EST MOD 30 MIN: CPT

## 2021-12-01 PROCEDURE — 93000 ELECTROCARDIOGRAM COMPLETE: CPT

## 2021-12-01 NOTE — DISCUSSION/SUMMARY
[___ Month(s)] : in [unfilled] month(s) [FreeTextEntry1] : The patient is an 88-year-old female history of hypertension, hyperlipidemia, hypothyroidism, esophageal reflux, mitral stenosis and aortic stenosis, s/p fall with right arm fracture who is still recovering. \par #1 Htn-  continue losartan, amlodipine , metoprolol\par #2 MS/AS- JOCELYNN 1.0\par #3 Lipids- lipid panel acceptable\par #4 Hypothyroid- levothyroxine\par #5 General- Continue daily walking with face covering. Received COVID vaccine\par Fall was secondary to misstep, Now starting PT for right arm. \par

## 2021-12-01 NOTE — HISTORY OF PRESENT ILLNESS
[FreeTextEntry1] : Manasa had fallen and broke her  elbow when she fell outside the office. She had surgery and had to stay with her son until well enough to come home. Finger tips are numb. Now doing OT/PT. Losing weight. Afraid of losing independence.

## 2021-12-13 ENCOUNTER — APPOINTMENT (OUTPATIENT)
Dept: PULMONOLOGY | Facility: CLINIC | Age: 86
End: 2021-12-13
Payer: MEDICARE

## 2021-12-13 ENCOUNTER — LABORATORY RESULT (OUTPATIENT)
Age: 86
End: 2021-12-13

## 2021-12-13 VITALS
OXYGEN SATURATION: 99 % | HEIGHT: 59 IN | DIASTOLIC BLOOD PRESSURE: 77 MMHG | SYSTOLIC BLOOD PRESSURE: 146 MMHG | TEMPERATURE: 97.3 F | RESPIRATION RATE: 12 BRPM | WEIGHT: 110 LBS | BODY MASS INDEX: 22.18 KG/M2 | HEART RATE: 86 BPM

## 2021-12-13 DIAGNOSIS — K21.9 GASTRO-ESOPHAGEAL REFLUX DISEASE W/OUT ESOPHAGITIS: ICD-10-CM

## 2021-12-13 DIAGNOSIS — S42.401A UNSPECIFIED FRACTURE OF LOWER END OF RIGHT HUMERUS, INITIAL ENCOUNTER FOR CLOSED FRACTURE: ICD-10-CM

## 2021-12-13 PROCEDURE — 99214 OFFICE O/P EST MOD 30 MIN: CPT

## 2021-12-15 LAB
ALBUMIN SERPL ELPH-MCNC: 4.5 G/DL
ALP BLD-CCNC: 77 U/L
ALT SERPL-CCNC: 14 U/L
ANION GAP SERPL CALC-SCNC: 13 MMOL/L
AST SERPL-CCNC: 17 U/L
BASOPHILS # BLD AUTO: 0.07 K/UL
BASOPHILS NFR BLD AUTO: 0.9 %
BILIRUB SERPL-MCNC: 0.4 MG/DL
BUN SERPL-MCNC: 16 MG/DL
CALCIUM SERPL-MCNC: 9.5 MG/DL
CHLORIDE SERPL-SCNC: 104 MMOL/L
CHOLEST SERPL-MCNC: 158 MG/DL
CO2 SERPL-SCNC: 23 MMOL/L
CREAT SERPL-MCNC: 0.61 MG/DL
EOSINOPHIL # BLD AUTO: 0.12 K/UL
EOSINOPHIL NFR BLD AUTO: 1.6 %
GLUCOSE SERPL-MCNC: 84 MG/DL
HCT VFR BLD CALC: 36.7 %
HDLC SERPL-MCNC: 61 MG/DL
HGB BLD-MCNC: 10.9 G/DL
IMM GRANULOCYTES NFR BLD AUTO: 0.5 %
LDLC SERPL CALC-MCNC: 79 MG/DL
LYMPHOCYTES # BLD AUTO: 1.41 K/UL
LYMPHOCYTES NFR BLD AUTO: 18.2 %
MAN DIFF?: NORMAL
MCHC RBC-ENTMCNC: 18.4 PG
MCHC RBC-ENTMCNC: 29.7 GM/DL
MCV RBC AUTO: 61.9 FL
MONOCYTES # BLD AUTO: 0.47 K/UL
MONOCYTES NFR BLD AUTO: 6.1 %
NEUTROPHILS # BLD AUTO: 5.63 K/UL
NEUTROPHILS NFR BLD AUTO: 72.7 %
NONHDLC SERPL-MCNC: 97 MG/DL
PLATELET # BLD AUTO: 228 K/UL
POTASSIUM SERPL-SCNC: 3.8 MMOL/L
PROT SERPL-MCNC: 6.8 G/DL
RBC # BLD: 5.93 M/UL
RBC # FLD: 18.3 %
SODIUM SERPL-SCNC: 141 MMOL/L
TRIGL SERPL-MCNC: 90 MG/DL
TSH SERPL-ACNC: 2.27 UIU/ML
WBC # FLD AUTO: 7.74 K/UL

## 2021-12-28 LAB
ESTIMATED AVERAGE GLUCOSE: 103 MG/DL
HBA1C MFR BLD HPLC: 5.2 %

## 2021-12-29 ENCOUNTER — APPOINTMENT (OUTPATIENT)
Dept: UROGYNECOLOGY | Facility: CLINIC | Age: 86
End: 2021-12-29

## 2022-01-20 ENCOUNTER — APPOINTMENT (OUTPATIENT)
Dept: UROLOGY | Facility: CLINIC | Age: 87
End: 2022-01-20

## 2022-01-27 ENCOUNTER — APPOINTMENT (OUTPATIENT)
Dept: OPHTHALMOLOGY | Facility: CLINIC | Age: 87
End: 2022-01-27
Payer: MEDICARE

## 2022-01-27 ENCOUNTER — NON-APPOINTMENT (OUTPATIENT)
Age: 87
End: 2022-01-27

## 2022-01-27 PROCEDURE — 92015 DETERMINE REFRACTIVE STATE: CPT

## 2022-01-27 PROCEDURE — 92012 INTRM OPH EXAM EST PATIENT: CPT

## 2022-02-07 ENCOUNTER — APPOINTMENT (OUTPATIENT)
Dept: PULMONOLOGY | Facility: CLINIC | Age: 87
End: 2022-02-07
Payer: MEDICARE

## 2022-02-07 ENCOUNTER — NON-APPOINTMENT (OUTPATIENT)
Age: 87
End: 2022-02-07

## 2022-02-07 ENCOUNTER — APPOINTMENT (OUTPATIENT)
Dept: CARDIOLOGY | Facility: CLINIC | Age: 87
End: 2022-02-07
Payer: MEDICARE

## 2022-02-07 VITALS — DIASTOLIC BLOOD PRESSURE: 70 MMHG | SYSTOLIC BLOOD PRESSURE: 130 MMHG

## 2022-02-07 VITALS
DIASTOLIC BLOOD PRESSURE: 73 MMHG | SYSTOLIC BLOOD PRESSURE: 130 MMHG | OXYGEN SATURATION: 99 % | HEART RATE: 79 BPM | RESPIRATION RATE: 12 BRPM

## 2022-02-07 DIAGNOSIS — R06.02 SHORTNESS OF BREATH: ICD-10-CM

## 2022-02-07 PROCEDURE — 99214 OFFICE O/P EST MOD 30 MIN: CPT | Mod: 25

## 2022-02-07 PROCEDURE — 93000 ELECTROCARDIOGRAM COMPLETE: CPT

## 2022-02-07 PROCEDURE — 99214 OFFICE O/P EST MOD 30 MIN: CPT

## 2022-02-07 RX ORDER — CEPHALEXIN 500 MG/1
500 TABLET ORAL
Qty: 14 | Refills: 0 | Status: DISCONTINUED | COMMUNITY
Start: 2021-05-25 | End: 2022-02-07

## 2022-02-07 NOTE — HISTORY OF PRESENT ILLNESS
[TextBox_4] : Patient is an 88-year-old female with multiple medical problems including moderate aortic stenosis congestive heart failure asthma Raynaud's disease and chronic UTIs who presents for an acute visit.  The patient now complains of worsening shortness of breath and dyspnea on exertion.  She states she is only able to ambulate 20 feet without stopping for rest.  She complains of fatigue and malaise.  She currently denies fevers chills chest pain weight loss or hemoptysis.  She states that she is compliant with her diet

## 2022-02-07 NOTE — REASON FOR VISIT
[Acute] : an acute visit [Asthma] : asthma [Cough] : cough [Shortness of Breath] : shortness of breath

## 2022-02-07 NOTE — ASSESSMENT
[FreeTextEntry1] : In summary the patient is an 88-year-old female with multiple medical problems including anxiety asthma moderate aortic stenosis, hypertension, dyslipidemia who presents today for an acute visit.  Her physical exam is significant for 2+ pitting edema as well as bibasilar Rales.  The patient has acute on chronic congestive heart failure with in my opinion worsening aortic stenosis.  She is now symptomatic and is being started on a diuretic.  An electrocardiogram revealed no acute ischemic changes.  She is being referred to cardiology today and most likely would benefit from a TAVR.  The patient is instructed to follow-up in 1 week
H/O circumcision    History of ankle surgery    Scalp laceration  s/p staples Summer 2015

## 2022-02-07 NOTE — HISTORY OF PRESENT ILLNESS
[FreeTextEntry1] : Manasa is very SOB on walking around the house and cannot even go to the corner. No CP, palpitations, lightheadedness or dizziness.

## 2022-02-07 NOTE — DISCUSSION/SUMMARY
[___ Week(s)] : in [unfilled] week(s) [FreeTextEntry1] : The patient is an 88-year-old female history of hypertension, hyperlipidemia, hypothyroidism, esophageal reflux, mitral stenosis and aortic stenosis, with acute heart failure \par #1 Htn-  continue losartan, amlodipine , metoprolol\par #2 MS/AS- JOCELYNN 1.0, start lasix 20mg, BNP today RTO one week\par #3 Lipids- lipid panel acceptable\par #4 Hypothyroid- levothyroxine\par #5 General- Continue daily walking with face covering. Received COVID vaccine\par Fall was secondary to misstep, Now starting PT for right arm. \par

## 2022-02-07 NOTE — PHYSICAL EXAM
[No Acute Distress] : no acute distress [Normal Oropharynx] : normal oropharynx [Normal Appearance] : normal appearance [No Neck Mass] : no neck mass [Normal Rate/Rhythm] : normal rate/rhythm [Normal S1, S2] : normal s1, s2 [No Murmurs] : no murmurs [No Resp Distress] : no resp distress [Rales] : rales [No Abnormalities] : no abnormalities [Benign] : benign [Normal Gait] : normal gait [No Clubbing] : no clubbing [No Cyanosis] : no cyanosis [2+ Pitting] : 2+ pitting [Normal Color/ Pigmentation] : normal color/ pigmentation [No Focal Deficits] : no focal deficits [Oriented x3] : oriented x3 [Normal Affect] : normal affect

## 2022-02-08 LAB
ANION GAP SERPL CALC-SCNC: 19 MMOL/L
BUN SERPL-MCNC: 17 MG/DL
CALCIUM SERPL-MCNC: 9.4 MG/DL
CHLORIDE SERPL-SCNC: 104 MMOL/L
CO2 SERPL-SCNC: 19 MMOL/L
CREAT SERPL-MCNC: 0.74 MG/DL
GLUCOSE SERPL-MCNC: 84 MG/DL
NT-PROBNP SERPL-MCNC: 446 PG/ML
POTASSIUM SERPL-SCNC: 5.4 MMOL/L
SODIUM SERPL-SCNC: 142 MMOL/L

## 2022-02-09 ENCOUNTER — APPOINTMENT (OUTPATIENT)
Dept: PULMONOLOGY | Facility: CLINIC | Age: 87
End: 2022-02-09

## 2022-02-09 NOTE — PHYSICAL EXAM
[No Acute Distress] : no acute distress [Normal Oropharynx] : normal oropharynx [Normal Appearance] : normal appearance [No Neck Mass] : no neck mass [Normal Rate/Rhythm] : normal rate/rhythm [Normal S1, S2] : normal s1, s2 [No Resp Distress] : no resp distress [Clear to Auscultation Bilaterally] : clear to auscultation bilaterally [No Abnormalities] : no abnormalities [Benign] : benign [Normal Gait] : normal gait [No Clubbing] : no clubbing [No Cyanosis] : no cyanosis [FROM] : FROM [Normal Color/ Pigmentation] : normal color/ pigmentation [No Focal Deficits] : no focal deficits [Oriented x3] : oriented x3 [Normal Affect] : normal affect [TextBox_54] : + murmur [TextBox_105] : + dependent edema

## 2022-02-09 NOTE — HISTORY OF PRESENT ILLNESS
[Never] : never [TextBox_4] : Patient is an 88-year-old female with history of  hypertension, anxiety, asthma, aortic stenosis, rheumatoid arthritis who presents for evaluation of dyspnea\par \par \par Patient reports for the past week she has been becoming dyspneic on exertion with minimal activity. Denies fevers, chills, cough, hemoptysis, chest pain or palpitations.\par \par

## 2022-02-14 ENCOUNTER — APPOINTMENT (OUTPATIENT)
Dept: PULMONOLOGY | Facility: CLINIC | Age: 87
End: 2022-02-14
Payer: MEDICARE

## 2022-02-14 ENCOUNTER — APPOINTMENT (OUTPATIENT)
Dept: CARDIOLOGY | Facility: CLINIC | Age: 87
End: 2022-02-14
Payer: MEDICARE

## 2022-02-14 ENCOUNTER — RX RENEWAL (OUTPATIENT)
Age: 87
End: 2022-02-14

## 2022-02-14 VITALS
TEMPERATURE: 97.2 F | OXYGEN SATURATION: 97 % | RESPIRATION RATE: 12 BRPM | DIASTOLIC BLOOD PRESSURE: 51 MMHG | HEART RATE: 76 BPM | SYSTOLIC BLOOD PRESSURE: 142 MMHG

## 2022-02-14 PROCEDURE — 99214 OFFICE O/P EST MOD 30 MIN: CPT

## 2022-02-14 NOTE — HISTORY OF PRESENT ILLNESS
[FreeTextEntry1] : Manasa is breathing better on lasix but c/o urination every hour at night and inability to sleep. Also has Raynauds. Has appt with urogyn.

## 2022-02-14 NOTE — ASSESSMENT
[FreeTextEntry1] : In summary the patient is an 88-year-old female with past medical history significant for worsening aortic stenosis, hypertension, Raynaud's disease who presents for follow-up.  Patient states that her shortness of breath improved with her diuretic.  She is still considering undergoing a TAVR.  The patient's physical exam is significant for improved air entry bilaterally.  I have instructed her to continue her current medication and have vehemently recommended that she undergo a TAVR.  She is instructed to continue with cardiology and follow-up in 3 months

## 2022-02-14 NOTE — PHYSICAL EXAM
[No Acute Distress] : no acute distress [Normal Oropharynx] : normal oropharynx [Normal Appearance] : normal appearance [No Neck Mass] : no neck mass [Normal Rate/Rhythm] : normal rate/rhythm [Normal S1, S2] : normal s1, s2 [No Resp Distress] : no resp distress [Clear to Auscultation Bilaterally] : clear to auscultation bilaterally [No Abnormalities] : no abnormalities [Benign] : benign [Normal Gait] : normal gait [No Clubbing] : no clubbing [No Cyanosis] : no cyanosis [FROM] : FROM [Normal Color/ Pigmentation] : normal color/ pigmentation [No Focal Deficits] : no focal deficits [Oriented x3] : oriented x3 [Normal Affect] : normal affect [TextBox_54] : +murmur [TextBox_105] : + trace ankle edema

## 2022-02-14 NOTE — HISTORY OF PRESENT ILLNESS
[Former] : former [Never] : never [TextBox_4] : Patient is an 88-year-old female with multiple medical problems including moderate aortic stenosis congestive heart failure asthma Raynaud's disease and chronic UTIs who presents for follow-up \par \par Patient reports shortness of breath is much improved. She currently denies fevers chills chest pain weight loss or hemoptysis. She states that she is compliant with her diet and medication regimen.\par

## 2022-02-14 NOTE — DISCUSSION/SUMMARY
[___ Month(s)] : in [unfilled] month(s) [FreeTextEntry1] : The patient is an 88-year-old female history of hypertension, hyperlipidemia, hypothyroidism, esophageal reflux, mitral stenosis and aortic stenosis, with acute heart failure that has responded to low dose lasix\par #1 Htn-  continue losartan, amlodipine , metoprolol\par #2 MS/AS- JOCELYNN 1.0,continue lasix 20mg,ECHO and appt with valve clinic\par #3 Lipids- lipid panel acceptable\par #4 Hypothyroid- levothyroxine\par #5 General- Continue daily walking with face covering. Received COVID vaccine\par Discussed decreasing metoprolol for Raynauds but wants to wait. \par

## 2022-02-14 NOTE — PHYSICAL EXAM
[Well Developed] : well developed [Well Nourished] : well nourished [No Acute Distress] : no acute distress [Normal Conjunctiva] : normal conjunctiva [Normal Venous Pressure] : normal venous pressure [No Carotid Bruit] : no carotid bruit [Normal S1, S2] : normal S1, S2 [No Murmur] : no murmur [No Rub] : no rub [No Gallop] : no gallop [Clear Lung Fields] : clear lung fields [Good Air Entry] : good air entry [No Respiratory Distress] : no respiratory distress  [Non Tender] : non-tender [Soft] : abdomen soft [No Masses/organomegaly] : no masses/organomegaly [Normal Bowel Sounds] : normal bowel sounds [Normal Gait] : normal gait [No Edema] : no edema [No Cyanosis] : no cyanosis [No Clubbing] : no clubbing [No Varicosities] : no varicosities [No Skin Lesions] : no skin lesions [No Rash] : no rash [Moves all extremities] : moves all extremities [No Focal Deficits] : no focal deficits [Normal Speech] : normal speech [Alert and Oriented] : alert and oriented [Normal memory] : normal memory

## 2022-03-01 ENCOUNTER — RX CHANGE (OUTPATIENT)
Age: 87
End: 2022-03-01

## 2022-03-03 ENCOUNTER — OUTPATIENT (OUTPATIENT)
Dept: OUTPATIENT SERVICES | Facility: HOSPITAL | Age: 87
LOS: 1 days | End: 2022-03-03
Payer: MEDICARE

## 2022-03-03 DIAGNOSIS — I35.0 NONRHEUMATIC AORTIC (VALVE) STENOSIS: ICD-10-CM

## 2022-03-03 PROCEDURE — 93306 TTE W/DOPPLER COMPLETE: CPT | Mod: 26

## 2022-03-03 PROCEDURE — 93306 TTE W/DOPPLER COMPLETE: CPT

## 2022-03-04 ENCOUNTER — NON-APPOINTMENT (OUTPATIENT)
Age: 87
End: 2022-03-04

## 2022-03-04 ENCOUNTER — APPOINTMENT (OUTPATIENT)
Dept: CARDIOTHORACIC SURGERY | Facility: CLINIC | Age: 87
End: 2022-03-04
Payer: MEDICARE

## 2022-03-04 ENCOUNTER — APPOINTMENT (OUTPATIENT)
Dept: CARDIOTHORACIC SURGERY | Facility: CLINIC | Age: 87
End: 2022-03-04

## 2022-03-04 VITALS
WEIGHT: 114 LBS | BODY MASS INDEX: 22.98 KG/M2 | OXYGEN SATURATION: 97 % | SYSTOLIC BLOOD PRESSURE: 136 MMHG | HEART RATE: 66 BPM | HEIGHT: 59 IN | RESPIRATION RATE: 14 BRPM | DIASTOLIC BLOOD PRESSURE: 72 MMHG

## 2022-03-04 DIAGNOSIS — I05.0 RHEUMATIC MITRAL STENOSIS: ICD-10-CM

## 2022-03-04 PROCEDURE — 99204 OFFICE O/P NEW MOD 45 MIN: CPT

## 2022-03-04 RX ORDER — GABAPENTIN 100 MG/1
100 CAPSULE ORAL 3 TIMES DAILY
Qty: 90 | Refills: 3 | Status: DISCONTINUED | COMMUNITY
Start: 2020-12-09 | End: 2022-03-04

## 2022-03-04 RX ORDER — OMEPRAZOLE 40 MG/1
40 CAPSULE, DELAYED RELEASE ORAL
Qty: 90 | Refills: 3 | Status: DISCONTINUED | COMMUNITY
Start: 2018-11-27 | End: 2022-03-04

## 2022-03-04 RX ORDER — CONJUGATED ESTROGENS 0.62 MG/G
0.62 CREAM VAGINAL
Qty: 1 | Refills: 1 | Status: DISCONTINUED | COMMUNITY
Start: 2019-01-03 | End: 2022-03-04

## 2022-03-04 RX ORDER — QUETIAPINE FUMARATE 25 MG/1
25 TABLET ORAL
Qty: 30 | Refills: 3 | Status: DISCONTINUED | COMMUNITY
Start: 2021-03-24 | End: 2022-03-04

## 2022-03-04 RX ORDER — SOLIFENACIN SUCCINATE 10 MG/1
10 TABLET ORAL
Qty: 30 | Refills: 1 | Status: DISCONTINUED | COMMUNITY
Start: 2021-08-30 | End: 2022-03-04

## 2022-03-04 RX ORDER — ALBUTEROL SULFATE 90 UG/1
108 (90 BASE) AEROSOL, METERED RESPIRATORY (INHALATION)
Qty: 1 | Refills: 1 | Status: DISCONTINUED | COMMUNITY
Start: 2017-03-01 | End: 2022-03-04

## 2022-03-04 RX ORDER — ZOLPIDEM TARTRATE 5 MG/1
5 TABLET ORAL
Qty: 30 | Refills: 0 | Status: DISCONTINUED | COMMUNITY
Start: 2019-01-16 | End: 2022-03-04

## 2022-03-04 RX ORDER — SULFAMETHOXAZOLE AND TRIMETHOPRIM 800; 160 MG/1; MG/1
800-160 TABLET ORAL
Qty: 6 | Refills: 0 | Status: DISCONTINUED | COMMUNITY
Start: 2021-07-09 | End: 2022-03-04

## 2022-03-04 NOTE — DISCUSSION/SUMMARY
[FreeTextEntry1] : Ms Nevarez is referred for evaluation of complex valvular heart disease with MS and AS. Her recent TTE demonstrates at least moderate MS with calcified MV leaflets and mean gradients reported in the range of 8-10 mmHg; there is also aortic stenosis, however with low mean gradients, which may be a result of the MS (and diminished LV preload and stroke volume). She is not interested in considering surgical AVR/MVR, which I agree would likely be a high-risk option for her. As such, I am recommending a AMRIK to get a better sense of her MS and AS severity--essentially to determine if it would be reasonable to consider proceeding with an isolated TAVR. I made no changes to her regimen today--her BP is at goal and she has only trace edema. Ultimately, if TAVR is to be considered, she will see Dr Boyd (CTS, she was scheduled to see her today, but she was in the OR) and undergo both a cardiac structural CT and coronary angiography. I asked that she call me to discuss the AMRIK results once completed; it is being scheduled with Dr Dennison.

## 2022-03-04 NOTE — HISTORY OF PRESENT ILLNESS
[FreeTextEntry1] : Ms. Nevarez is an 88 year old female referred by Dr. John for evaluation of complex valvular heart disease with both aortic and mitral stenosis. She reports not feeling "myself". She states she feels tired, and her balance is off. She states that she will get fatigued and SOB with activity. She has intermittent dizziness but never has had a syncopal episode. She reports not really noticing any pedal edema, but when she last went to her PCP, he had noticed she had some slight edema. She is a former smoker and quit 50 years ago. She describes disturbed sleep due to frequent urination, but has not had any issues with SOB at night. She will use 2 pillows normally. She has had a good appetite. She has had no heart failure related admissions. She lives at home on her own and is independent in her ADL's. She is accompanied by a long time friend today.\par \par Her past medical history includes asthma, GERD, HTN, HLD, hypothyroidism, breast cancer, pulmonary nodule, and recently diagnosed Raynaud's Syndrome.\par \par She notes feeling fatigued, "my balance is not good any more", and "I'm not me, I'm not myself", which her friend corroborates. Her friend notes "she has slowed down to a snail's pace" and her functional capacity has diminished over the past year. She "tripped and fell twice in the street" and required "pins" in her right elbow; she states it was not syncope. She reports "a couple of months of Raynaud's" which is very bothersome to her. Lasix was recently added "because they saw water in my chest" and she reports no change in symptoms since starting it. She urinates "every hour, up to 12 times a night; 8 times is a good night" and notes "I have had this urinating problem for a while, which nobody seems to help" and is seeing a Nephrologist (she does not remember who). She reports no angina. She becomes winded with climbing a FOS and notes "I can't do stairs" and her legs feel "weak" at times. She feels "in one year, I turned old" and was previously very active doing "exercise and walking" regularly. Her appetite is "very unusual, I am hungry all the time" and she is unsure why.

## 2022-03-04 NOTE — REVIEW OF SYSTEMS
[Fever] : no fever [Headache] : no headache [Chills] : no chills [Chest Discomfort] : no chest discomfort [Cough] : no cough [Wheezing] : no wheezing [Abdominal Pain] : no abdominal pain [Nausea] : no nausea [Vomiting] : no vomiting [Dysphagia] : no dysphagia [Constipation] : no constipation [Blood in Stool] : no blood in stool [FreeTextEntry7] : Increased appetite

## 2022-03-04 NOTE — CARDIOLOGY SUMMARY
[de-identified] : NSR 74\par LAE [de-identified] : 8/30/21\par EF 70%, JOCELYNN 1sqcm, mGr 22 mmHg, PGr 38 mmHg, AoV 3.1 m/s, moderate AS\par \par 3/3//21: JOCELYNN 1.0, mGr 18, pGr 34, AoV 2.9, No AI, Moderate AS, DVi= 0.39  EF 75\par Mean Mitral gradient 9, Mild to moderate MR, \par Moderate to severe MS

## 2022-03-10 ENCOUNTER — APPOINTMENT (OUTPATIENT)
Dept: CV DIAGNOSITCS | Facility: HOSPITAL | Age: 87
End: 2022-03-10

## 2022-03-10 ENCOUNTER — OUTPATIENT (OUTPATIENT)
Dept: OUTPATIENT SERVICES | Facility: HOSPITAL | Age: 87
LOS: 1 days | End: 2022-03-10
Payer: MEDICARE

## 2022-03-10 VITALS
RESPIRATION RATE: 16 BRPM | OXYGEN SATURATION: 95 % | DIASTOLIC BLOOD PRESSURE: 58 MMHG | HEART RATE: 68 BPM | SYSTOLIC BLOOD PRESSURE: 109 MMHG | TEMPERATURE: 97 F

## 2022-03-10 VITALS
DIASTOLIC BLOOD PRESSURE: 67 MMHG | TEMPERATURE: 97 F | OXYGEN SATURATION: 100 % | SYSTOLIC BLOOD PRESSURE: 140 MMHG | HEART RATE: 66 BPM | RESPIRATION RATE: 16 BRPM

## 2022-03-10 DIAGNOSIS — I34.0 NONRHEUMATIC MITRAL (VALVE) INSUFFICIENCY: ICD-10-CM

## 2022-03-10 PROCEDURE — 93320 DOPPLER ECHO COMPLETE: CPT | Mod: 26

## 2022-03-10 PROCEDURE — 93325 DOPPLER ECHO COLOR FLOW MAPG: CPT

## 2022-03-10 PROCEDURE — 76377 3D RENDER W/INTRP POSTPROCES: CPT

## 2022-03-10 PROCEDURE — 93320 DOPPLER ECHO COMPLETE: CPT

## 2022-03-10 PROCEDURE — 93312 ECHO TRANSESOPHAGEAL: CPT | Mod: 26

## 2022-03-10 PROCEDURE — 93325 DOPPLER ECHO COLOR FLOW MAPG: CPT | Mod: 26

## 2022-03-10 PROCEDURE — 93312 ECHO TRANSESOPHAGEAL: CPT

## 2022-03-10 PROCEDURE — 76377 3D RENDER W/INTRP POSTPROCES: CPT | Mod: 26

## 2022-03-10 RX ORDER — ONDANSETRON 8 MG/1
4 TABLET, FILM COATED ORAL ONCE
Refills: 0 | Status: DISCONTINUED | OUTPATIENT
Start: 2022-03-10 | End: 2022-03-24

## 2022-03-10 RX ORDER — FENTANYL CITRATE 50 UG/ML
25 INJECTION INTRAVENOUS
Refills: 0 | Status: DISCONTINUED | OUTPATIENT
Start: 2022-03-10 | End: 2022-03-10

## 2022-03-16 ENCOUNTER — APPOINTMENT (OUTPATIENT)
Dept: UROGYNECOLOGY | Facility: CLINIC | Age: 87
End: 2022-03-16

## 2022-03-17 ENCOUNTER — RESULT REVIEW (OUTPATIENT)
Age: 87
End: 2022-03-17

## 2022-03-21 ENCOUNTER — APPOINTMENT (OUTPATIENT)
Dept: CARDIOLOGY | Facility: CLINIC | Age: 87
End: 2022-03-21

## 2022-03-25 ENCOUNTER — OUTPATIENT (OUTPATIENT)
Dept: OUTPATIENT SERVICES | Facility: HOSPITAL | Age: 87
LOS: 1 days | End: 2022-03-25
Payer: MEDICARE

## 2022-03-25 ENCOUNTER — APPOINTMENT (OUTPATIENT)
Dept: CARDIOTHORACIC SURGERY | Facility: CLINIC | Age: 87
End: 2022-03-25

## 2022-03-25 ENCOUNTER — APPOINTMENT (OUTPATIENT)
Dept: CARDIOLOGY | Facility: CLINIC | Age: 87
End: 2022-03-25

## 2022-03-25 DIAGNOSIS — I35.0 NONRHEUMATIC AORTIC (VALVE) STENOSIS: ICD-10-CM

## 2022-03-25 DIAGNOSIS — Z00.00 ENCOUNTER FOR GENERAL ADULT MEDICAL EXAMINATION WITHOUT ABNORMAL FINDINGS: ICD-10-CM

## 2022-03-25 PROCEDURE — 75572 CT HRT W/3D IMAGE: CPT | Mod: 26,ME

## 2022-03-25 PROCEDURE — 75572 CT HRT W/3D IMAGE: CPT | Mod: ME

## 2022-03-25 PROCEDURE — G1004: CPT

## 2022-03-29 ENCOUNTER — OUTPATIENT (OUTPATIENT)
Dept: OUTPATIENT SERVICES | Facility: HOSPITAL | Age: 87
LOS: 1 days | End: 2022-03-29
Payer: MEDICARE

## 2022-03-29 DIAGNOSIS — Z11.52 ENCOUNTER FOR SCREENING FOR COVID-19: ICD-10-CM

## 2022-03-29 LAB — SARS-COV-2 RNA SPEC QL NAA+PROBE: SIGNIFICANT CHANGE UP

## 2022-03-29 PROCEDURE — U0005: CPT

## 2022-03-29 PROCEDURE — C9803: CPT

## 2022-03-29 PROCEDURE — U0003: CPT

## 2022-03-30 ENCOUNTER — FORM ENCOUNTER (OUTPATIENT)
Age: 87
End: 2022-03-30

## 2022-03-31 ENCOUNTER — TRANSCRIPTION ENCOUNTER (OUTPATIENT)
Age: 87
End: 2022-03-31

## 2022-03-31 ENCOUNTER — OUTPATIENT (OUTPATIENT)
Dept: OUTPATIENT SERVICES | Facility: HOSPITAL | Age: 87
LOS: 1 days | End: 2022-03-31
Payer: MEDICARE

## 2022-03-31 VITALS
OXYGEN SATURATION: 100 % | DIASTOLIC BLOOD PRESSURE: 63 MMHG | HEIGHT: 59 IN | TEMPERATURE: 98 F | RESPIRATION RATE: 16 BRPM | HEART RATE: 65 BPM | WEIGHT: 115.08 LBS | SYSTOLIC BLOOD PRESSURE: 136 MMHG

## 2022-03-31 VITALS
HEART RATE: 73 BPM | OXYGEN SATURATION: 96 % | RESPIRATION RATE: 16 BRPM | SYSTOLIC BLOOD PRESSURE: 131 MMHG | DIASTOLIC BLOOD PRESSURE: 59 MMHG

## 2022-03-31 DIAGNOSIS — I35.0 NONRHEUMATIC AORTIC (VALVE) STENOSIS: ICD-10-CM

## 2022-03-31 LAB
ALBUMIN SERPL ELPH-MCNC: 4.4 G/DL — SIGNIFICANT CHANGE UP (ref 3.3–5)
ALP SERPL-CCNC: 75 U/L — SIGNIFICANT CHANGE UP (ref 40–120)
ALT FLD-CCNC: 17 U/L — SIGNIFICANT CHANGE UP (ref 10–45)
ANION GAP SERPL CALC-SCNC: 12 MMOL/L — SIGNIFICANT CHANGE UP (ref 5–17)
AST SERPL-CCNC: 21 U/L — SIGNIFICANT CHANGE UP (ref 10–40)
BILIRUB SERPL-MCNC: 0.6 MG/DL — SIGNIFICANT CHANGE UP (ref 0.2–1.2)
BUN SERPL-MCNC: 19 MG/DL — SIGNIFICANT CHANGE UP (ref 7–23)
CALCIUM SERPL-MCNC: 9.3 MG/DL — SIGNIFICANT CHANGE UP (ref 8.4–10.5)
CHLORIDE SERPL-SCNC: 106 MMOL/L — SIGNIFICANT CHANGE UP (ref 96–108)
CO2 SERPL-SCNC: 25 MMOL/L — SIGNIFICANT CHANGE UP (ref 22–31)
CREAT SERPL-MCNC: 0.53 MG/DL — SIGNIFICANT CHANGE UP (ref 0.5–1.3)
EGFR: 88 ML/MIN/1.73M2 — SIGNIFICANT CHANGE UP
GLUCOSE SERPL-MCNC: 96 MG/DL — SIGNIFICANT CHANGE UP (ref 70–99)
HCT VFR BLD CALC: 35.2 % — SIGNIFICANT CHANGE UP (ref 34.5–45)
HGB BLD-MCNC: 10.9 G/DL — LOW (ref 11.5–15.5)
MCHC RBC-ENTMCNC: 18.3 PG — LOW (ref 27–34)
MCHC RBC-ENTMCNC: 31 GM/DL — LOW (ref 32–36)
MCV RBC AUTO: 59.2 FL — LOW (ref 80–100)
NRBC # BLD: 0 /100 WBCS — SIGNIFICANT CHANGE UP (ref 0–0)
PLATELET # BLD AUTO: 216 K/UL — SIGNIFICANT CHANGE UP (ref 150–400)
POTASSIUM SERPL-MCNC: 3.8 MMOL/L — SIGNIFICANT CHANGE UP (ref 3.5–5.3)
POTASSIUM SERPL-SCNC: 3.8 MMOL/L — SIGNIFICANT CHANGE UP (ref 3.5–5.3)
PROT SERPL-MCNC: 6.9 G/DL — SIGNIFICANT CHANGE UP (ref 6–8.3)
RBC # BLD: 5.95 M/UL — HIGH (ref 3.8–5.2)
RBC # FLD: 18.6 % — HIGH (ref 10.3–14.5)
SODIUM SERPL-SCNC: 143 MMOL/L — SIGNIFICANT CHANGE UP (ref 135–145)
WBC # BLD: 8.08 K/UL — SIGNIFICANT CHANGE UP (ref 3.8–10.5)
WBC # FLD AUTO: 8.08 K/UL — SIGNIFICANT CHANGE UP (ref 3.8–10.5)

## 2022-03-31 PROCEDURE — C1769: CPT

## 2022-03-31 PROCEDURE — 36415 COLL VENOUS BLD VENIPUNCTURE: CPT

## 2022-03-31 PROCEDURE — 93010 ELECTROCARDIOGRAM REPORT: CPT

## 2022-03-31 PROCEDURE — 99152 MOD SED SAME PHYS/QHP 5/>YRS: CPT

## 2022-03-31 PROCEDURE — 93454 CORONARY ARTERY ANGIO S&I: CPT

## 2022-03-31 PROCEDURE — 80053 COMPREHEN METABOLIC PANEL: CPT

## 2022-03-31 PROCEDURE — 85027 COMPLETE CBC AUTOMATED: CPT

## 2022-03-31 PROCEDURE — 93454 CORONARY ARTERY ANGIO S&I: CPT | Mod: 26

## 2022-03-31 PROCEDURE — C1894: CPT

## 2022-03-31 PROCEDURE — 93005 ELECTROCARDIOGRAM TRACING: CPT | Mod: XU

## 2022-03-31 PROCEDURE — C1887: CPT

## 2022-03-31 NOTE — ASU DISCHARGE PLAN (ADULT/PEDIATRIC) - NS MD DC FALL RISK RISK
For information on Fall & Injury Prevention, visit: [email or web address] OR  [email or web address] OR  https://www.cdc.gov/steadi/patient.html

## 2022-03-31 NOTE — ASU DISCHARGE PLAN (ADULT/PEDIATRIC) - CARE PROVIDER_API CALL
Antonia John)  Cardiovascular Disease; Interventional Cardiology  [address] [place] [address]  Phone: [phone]  Fax: [phone]  Follow Up Time: 2 weeks

## 2022-03-31 NOTE — H&P CARDIOLOGY - HISTORY OF PRESENT ILLNESS
This is a 88 yo  F with PH of  HTN, HLD, AS and MS, central retinal vein occlusion, hypothyroid, mild persistent asthma, breast cancer 25yrs ago s/p lumpectomy/RXT, uterine prolapse (with pessary), anxiety, recurrent UTI (on methenamine for prophylaxis), pulm nodule and Raynaud's Syndrome.  Referred by Dr. John to Dr Lazar for evaluation of complex valvular heart disease with both aortic and mitral stenosis. She reports not feeling "myself". She states she feels tired, and her balance is off. She states that she will get fatigued and SOB with activity. She has intermittent dizziness but never has had a syncopal episode. She reports not really noticing any pedal edema, but when she last went to her PCP, he had noticed she had some slight edema. She is a former smoker and quit 50 years ago. She describes disturbed sleep due to frequent urination, but has not had any issues with SOB at night. She will use 2 pillows normally. She has had a good appetite. She has had no heart failure related admissions. She lives at home on her own and is independent in her ADL's.     She notes feeling fatigued, "my balance is not good any more", and "I'm not me, I'm not myself", which her friend corroborates. Her friend notes "she has slowed down to a snail's pace" and her functional capacity has diminished over the past year. She "tripped and fell twice in the street" and required "pins" in her right elbow; she states it was not syncope. She reports "a couple of months of Raynaud's" which is very bothersome to her. Lasix was recently added "because they saw water in my chest" and she reports no change in symptoms since starting it. She urinates "every hour, up to 12 times a night; 8 times is a good night" and notes "I have had this urinating problem for a while, which nobody seems to help" and is seeing a Nephrologist (she does not remember who). She reports no angina. She becomes winded with climbing a FOS and notes "I can't do stairs" and her legs feel "weak" at times. She feels "in one year, I turned old" and was previously very active doing "exercise and walking" regularly. Her appetite is "very unusual, I am hungry all the time" and she is unsure why.     Presents here today for [facility] for TAVR  pre op work up.        Cardiology Summary    ECG: NSR 74  LAE   Echo: 8/30/21  EF 70%, JOCELYNN 1sqcm, mGr 22 mmHg, PGr 38 mmHg, AoV 3.1 m/s, moderate AS    3/3//21: JOCELYNN 1.0, mGr 18, pGr 34, AoV 2.9, No AI, Moderate AS, DVi= 0.39 EF 75  Mean Mitral gradient 9, Mild to moderate MR,   Moderate to severe MS     < from: Transesophageal Echocardiogram w/o TTE (03.10.22 @ 08:14) >  Conclusions:  1. There is extenisve dytrophic mitral annular, leaflet and  chordal calcification. There are two jets origination from  A2/P2.  The total 3D VCA= 23mm2 with a regurgitant volume= 44ml.  MR VTI= 191cm, MR Vmax= 5.5m/s.  There is systolic blunting in the left pulmonary vein.  There is systolic dominance in the right pulmonary vein.  There is moderate mitral stenosis.  The MVOA by 3D planimetry= 1.6cm2.  The peak/mean diastolic mitral gradients= 15/7mmHG (HR=  70bpm)  2. Calcified trileaflet aortic valve.  The right coronary  cusp is the most calcified and immobile.  There is severe aortic stenosis.  The JOCELYNN by 3Dplanimetry= 0.87cm2. Peak transaortic valve  gradient equals 31 mm Hg, mean transaortic valve gradient  equals 18 mm Hg, estimated aortic valve area equals 0.7  sqcm (by continuity equation), aortic valve velocity time  integral equals 75 cm, the DVI= 0.23, consistent with  severe aortic stenosis.  LV SV= 52ml, LV SV index= 35ml/m2  Low flow, low gradient, severe aortic stenosis in the  setting of normal left ventricular function. No aortic  valve regurgitation seen.    < end of copied text >    < from: CT Heart without Coronaries w/ IV Cont (03.25.22 @ 10:15) >    IMPRESSION:  1.  Severely calcified (Agatston calcium score 3322) trileaflet aortic   valve.  2.  Aortic and peripheral access vessel measurements as reported above.    < end of copied text >

## 2022-03-31 NOTE — H&P CARDIOLOGY - NSICDXPASTMEDICALHX_GEN_ALL_CORE_FT
PAST MEDICAL HISTORY:  Anxiety     Aortic stenosis     Asthma     Benign essential hypertension     Breast cancer     Central retinal vein occlusion of right eye     H/O Raynaud's syndrome     Hyperlipidemia     Hypothyroid     Recurrent UTI     Uterine prolapse

## 2022-04-01 ENCOUNTER — APPOINTMENT (OUTPATIENT)
Dept: CARDIOTHORACIC SURGERY | Facility: CLINIC | Age: 87
End: 2022-04-01
Payer: MEDICARE

## 2022-04-01 ENCOUNTER — NON-APPOINTMENT (OUTPATIENT)
Age: 87
End: 2022-04-01

## 2022-04-01 VITALS
OXYGEN SATURATION: 98 % | HEIGHT: 59 IN | WEIGHT: 115 LBS | RESPIRATION RATE: 16 BRPM | DIASTOLIC BLOOD PRESSURE: 74 MMHG | HEART RATE: 68 BPM | TEMPERATURE: 97.7 F | SYSTOLIC BLOOD PRESSURE: 148 MMHG | BODY MASS INDEX: 23.18 KG/M2

## 2022-04-01 PROBLEM — Z86.79 PERSONAL HISTORY OF OTHER DISEASES OF THE CIRCULATORY SYSTEM: Chronic | Status: ACTIVE | Noted: 2022-03-31

## 2022-04-01 PROCEDURE — 99203 OFFICE O/P NEW LOW 30 MIN: CPT

## 2022-04-01 NOTE — PHYSICAL EXAM
[General Appearance - Alert] : alert [General Appearance - In No Acute Distress] : in no acute distress [General Appearance - Well Nourished] : well nourished [General Appearance - Well Developed] : well developed [Sclera] : the sclera and conjunctiva were normal [Neck Appearance] : the appearance of the neck was normal [Jugular Venous Distention Increased] : there was no jugular-venous distention [] : no respiratory distress [Respiration, Rhythm And Depth] : normal respiratory rhythm and effort [Auscultation Breath Sounds / Voice Sounds] : lungs were clear to auscultation bilaterally [III] : a grade 3 [Abnormal Walk] : normal gait [Musculoskeletal - Swelling] : no joint swelling seen [Skin Color & Pigmentation] : normal skin color and pigmentation [Skin Turgor] : normal skin turgor [Cranial Nerves] : cranial nerves 2-12 were intact [Sensation] : the sensory exam was normal to light touch and pinprick [Oriented To Time, Place, And Person] : oriented to person, place, and time [Impaired Insight] : insight and judgment were intact

## 2022-04-04 NOTE — REVIEW OF SYSTEMS
[Feeling Tired] : feeling tired [Shortness Of Breath] : shortness of breath [SOB on Exertion] : shortness of breath during exertion [Limb Swelling] : limb swelling [Negative] : Neurological [FreeTextEntry9] : BLL

## 2022-04-04 NOTE — DATA REVIEWED
[FreeTextEntry1] : Cardiac Cath: 3/31/2022:The coronary circulation is co-dominant. LM Left main artery: Angiography shows no disease. LAD  Proximal left anterior descending: There is a 40 % stenosis. First diagonal: Angiography shows mild atherosclerosis.CX Proximal circumflex: Angiography shows moderate atherosclerosis.Distal circumflex: There is a 60 % stenosis.RCA Proximal right coronary artery: There is a 20 % stenosis.  \par \par AMRIK 3/10/2022: There is extenisve dytrophic mitral annular, leaflet and chordal calcification. There are two jets origination from A2/P2. The total 3D VCA= 23mm2 with a regurgitant volume= 44ml. MR VTI= 191cm, MR Vmax= 5.5m/s. There is systolic blunting in the left pulmonary vein. There is systolic dominance in the right pulmonary vein. There is moderate mitral stenosis. The MVOA by 3D planimetry= 1.6cm2. The peak/mean diastolic mitral gradients= 15/7mmHG (HR=70bpm) Calcified trileaflet aortic valve.  The right coronary cusp is the most calcified and immobile.There is severe aortic stenosis. The JOCELYNN by Dplanimetry= 0.87cm2. Peak transaortic valve gradient equals 31 mm Hg, mean transaortic valve gradient\par equals 18 mm Hg, estimated aortic valve area equals 0.7 sqcm (by continuity equation), aortic valve velocity time integral equals 75 cm, the DVI= 0.23, consistent with severe aortic stenosis. LV SV= 52ml, LV SV index= 35ml/m2 Low flow, low gradient, severe aortic stenosis in the setting of normal left ventricular function. No aortic valve regurgitation seen.\par \par TTE from 3/3//21: JOCELYNN 1.0, mGr 18, pGr 34, AoV 2.9, No AI, Moderate AS, DVi= 0.39 EF 75\par Mean Mitral gradient 9, Mild to moderate MR, Moderate to severe MS\par \par TTE  from 8/30/2021 showed calcified aortic valve, PVG 38 mmHg, MVG 22 mmHg and JOCELYNN 1.0 cm2 consistent with mild AS. Also showed moderate MS, PVG 19 mmHg, MVG 7mmHg, valve area 1.4cm2 consistent with moderate MS, severely dilated LA, EF 70%.

## 2022-04-04 NOTE — CONSULT LETTER
[FreeTextEntry2] : Dr. Antonia John [FreeTextEntry3] : Magnus Boyd MD\par Attending Surgeon\par Cardiovascular & Thoracic Surgery\par  \par [facility]

## 2022-04-04 NOTE — ASSESSMENT
[FreeTextEntry1] :  89 year old female with symptomatic severe AS and moderate MS.  Risks and benefits of TAVR explained to patient who agrees to proceed with TAVR.\par \par 1) Will schedule TAVR\par 2) PST prior to TAVR\par

## 2022-04-15 ENCOUNTER — OUTPATIENT (OUTPATIENT)
Dept: OUTPATIENT SERVICES | Facility: HOSPITAL | Age: 87
LOS: 1 days | End: 2022-04-15

## 2022-04-15 ENCOUNTER — OUTPATIENT (OUTPATIENT)
Dept: OUTPATIENT SERVICES | Facility: HOSPITAL | Age: 87
LOS: 1 days | End: 2022-04-15
Payer: MEDICARE

## 2022-04-15 ENCOUNTER — NON-APPOINTMENT (OUTPATIENT)
Age: 87
End: 2022-04-15

## 2022-04-15 VITALS
RESPIRATION RATE: 18 BRPM | DIASTOLIC BLOOD PRESSURE: 70 MMHG | HEART RATE: 74 BPM | WEIGHT: 110.89 LBS | OXYGEN SATURATION: 97 % | HEIGHT: 57 IN | SYSTOLIC BLOOD PRESSURE: 132 MMHG | TEMPERATURE: 97 F

## 2022-04-15 DIAGNOSIS — Z90.722 ACQUIRED ABSENCE OF OVARIES, BILATERAL: Chronic | ICD-10-CM

## 2022-04-15 DIAGNOSIS — Z01.818 ENCOUNTER FOR OTHER PREPROCEDURAL EXAMINATION: ICD-10-CM

## 2022-04-15 DIAGNOSIS — I35.0 NONRHEUMATIC AORTIC (VALVE) STENOSIS: ICD-10-CM

## 2022-04-15 DIAGNOSIS — Z11.52 ENCOUNTER FOR SCREENING FOR COVID-19: ICD-10-CM

## 2022-04-15 DIAGNOSIS — I10 ESSENTIAL (PRIMARY) HYPERTENSION: ICD-10-CM

## 2022-04-15 DIAGNOSIS — Z29.9 ENCOUNTER FOR PROPHYLACTIC MEASURES, UNSPECIFIED: ICD-10-CM

## 2022-04-15 DIAGNOSIS — E78.5 HYPERLIPIDEMIA, UNSPECIFIED: ICD-10-CM

## 2022-04-15 LAB
A1C WITH ESTIMATED AVERAGE GLUCOSE RESULT: 5.5 % — SIGNIFICANT CHANGE UP (ref 4–5.6)
ANION GAP SERPL CALC-SCNC: 14 MMOL/L — SIGNIFICANT CHANGE UP (ref 5–17)
BLD GP AB SCN SERPL QL: NEGATIVE — SIGNIFICANT CHANGE UP
BUN SERPL-MCNC: 16 MG/DL — SIGNIFICANT CHANGE UP (ref 7–23)
CALCIUM SERPL-MCNC: 9.4 MG/DL — SIGNIFICANT CHANGE UP (ref 8.4–10.5)
CHLORIDE SERPL-SCNC: 106 MMOL/L — SIGNIFICANT CHANGE UP (ref 96–108)
CO2 SERPL-SCNC: 24 MMOL/L — SIGNIFICANT CHANGE UP (ref 22–31)
CREAT SERPL-MCNC: 0.52 MG/DL — SIGNIFICANT CHANGE UP (ref 0.5–1.3)
EGFR: 89 ML/MIN/1.73M2 — SIGNIFICANT CHANGE UP
ESTIMATED AVERAGE GLUCOSE: 111 MG/DL — SIGNIFICANT CHANGE UP (ref 68–114)
GLUCOSE SERPL-MCNC: 85 MG/DL — SIGNIFICANT CHANGE UP (ref 70–99)
HCT VFR BLD CALC: 36.8 % — SIGNIFICANT CHANGE UP (ref 34.5–45)
HGB BLD-MCNC: 11.1 G/DL — LOW (ref 11.5–15.5)
MCHC RBC-ENTMCNC: 18.4 PG — LOW (ref 27–34)
MCHC RBC-ENTMCNC: 30.2 GM/DL — LOW (ref 32–36)
MCV RBC AUTO: 61.1 FL — LOW (ref 80–100)
MRSA PCR RESULT.: SIGNIFICANT CHANGE UP
NRBC # BLD: 0 /100 WBCS — SIGNIFICANT CHANGE UP (ref 0–0)
PLATELET # BLD AUTO: 216 K/UL — SIGNIFICANT CHANGE UP (ref 150–400)
POTASSIUM SERPL-MCNC: 4.2 MMOL/L — SIGNIFICANT CHANGE UP (ref 3.5–5.3)
POTASSIUM SERPL-SCNC: 4.2 MMOL/L — SIGNIFICANT CHANGE UP (ref 3.5–5.3)
RBC # BLD: 6.02 M/UL — HIGH (ref 3.8–5.2)
RBC # FLD: 18.5 % — HIGH (ref 10.3–14.5)
RH IG SCN BLD-IMP: NEGATIVE — SIGNIFICANT CHANGE UP
S AUREUS DNA NOSE QL NAA+PROBE: SIGNIFICANT CHANGE UP
SARS-COV-2 RNA SPEC QL NAA+PROBE: SIGNIFICANT CHANGE UP
SODIUM SERPL-SCNC: 144 MMOL/L — SIGNIFICANT CHANGE UP (ref 135–145)
WBC # BLD: 7.1 K/UL — SIGNIFICANT CHANGE UP (ref 3.8–10.5)
WBC # FLD AUTO: 7.1 K/UL — SIGNIFICANT CHANGE UP (ref 3.8–10.5)

## 2022-04-15 PROCEDURE — 71046 X-RAY EXAM CHEST 2 VIEWS: CPT | Mod: 26

## 2022-04-15 PROCEDURE — 93880 EXTRACRANIAL BILAT STUDY: CPT | Mod: 26

## 2022-04-15 RX ORDER — CEFUROXIME AXETIL 250 MG
1500 TABLET ORAL ONCE
Refills: 0 | Status: DISCONTINUED | OUTPATIENT
Start: 2022-04-18 | End: 2022-04-19

## 2022-04-15 RX ORDER — FUROSEMIDE 40 MG
1 TABLET ORAL
Qty: 0 | Refills: 0 | DISCHARGE

## 2022-04-15 NOTE — H&P PST ADULT - NSICDXPASTMEDICALHX_GEN_ALL_CORE_FT
PAST MEDICAL HISTORY:  Anxiety     Aortic stenosis     Asthma     Benign essential hypertension     Breast cancer right  breast cancer  1990    Central retinal vein occlusion of right eye     H/O Raynaud's syndrome     Hyperlipidemia     Hypothyroid     Uterine prolapse      PAST MEDICAL HISTORY:  Anxiety no med    Aortic stenosis     Asthma no med    Benign essential hypertension     Breast cancer right  breast cancer  1990    Central retinal vein occlusion of right eye     H/O Raynaud's syndrome     Hyperlipidemia     Hypothyroid     Uterine prolapse

## 2022-04-15 NOTE — H&P PST ADULT - FALL HARM RISK - HARM RISK INTERVENTIONS

## 2022-04-15 NOTE — H&P PST ADULT - ASSESSMENT
ILAI VTE 2.0 SCORE [CLOT updated 2019]    AGE RELATED RISK FACTORS                                                       MOBILITY RELATED FACTORS  [ ] Age 41-60 years                                            (1 Point)                    [ ] Bed rest                                                        (1 Point)  [ ] Age: 61-74 years                                           (2 Points)                  [ ] Plaster cast                                                   (2 Points)  [x Age= 75 years                                              (3 Points)                    [ ] Bed bound for more than 72 hours                 (2 Points)    DISEASE RELATED RISK FACTORS                                               GENDER SPECIFIC FACTORS  [x ] Edema in the lower extremities                       (1 Point)              [ ] Pregnancy                                                     (1 Point)  [ ] Varicose veins                                               (1 Point)                     [ ] Post-partum < 6 weeks                                   (1 Point)             [ ] BMI > 25 Kg/m2                                            (1 Point)                     [ ] Hormonal therapy  or oral contraception          (1 Point)                 [ ] Sepsis (in the previous month)                        (1 Point)               [ ] History of pregnancy complications                 (1 point)  [ ] Pneumonia or serious lung disease                                               [ ] Unexplained or recurrent                     (1 Point)           (in the previous month)                               (1 Point)  [ ] Abnormal pulmonary function test                     (1 Point)                 SURGERY RELATED RISK FACTORS  [ ] Acute myocardial infarction                              (1 Point)               [ ]  Section                                             (1 Point)  [x ] Congestive heart failure (in the previous month)  (1 Point)      [ ] Minor surgery                                                  (1 Point)   [ ] Inflammatory bowel disease                             (1 Point)               [ ] Arthroscopic surgery                                        (2 Points)  [ ] Central venous access                                      (2 Points)                [x ] General surgery lasting more than 45 minutes (2 points)  [ ] Malignancy- Present or previous                   (2 Points)                [ ] Elective arthroplasty                                         (5 points)    [ ] Stroke (in the previous month)                          (5 Points)                                                                                                                                                           HEMATOLOGY RELATED FACTORS                                                 TRAUMA RELATED RISK FACTORS  [ ] Prior episodes of VTE                                     (3 Points)                [ ] Fracture of the hip, pelvis, or leg                       (5 Points)  [ ] Positive family history for VTE                         (3 Points)             [ ] Acute spinal cord injury (in the previous month)  (5 Points)  [ ] Prothrombin 72554 A                                     (3 Points)               [ ] Paralysis  (less than 1 month)                             (5 Points)  [ ] Factor V Leiden                                             (3 Points)                  [ ] Multiple Trauma within 1 month                        (5 Points)  [ ] Lupus anticoagulants                                     (3 Points)                                                           [ ] Anticardiolipin antibodies                               (3 Points)                                                       [ ] High homocysteine in the blood                      (3 Points)                                             [ ] Other congenital or acquired thrombophilia      (3 Points)                                                [ ] Heparin induced thrombocytopenia                  (3 Points)                                     Total Score [      7    ]

## 2022-04-15 NOTE — H&P PST ADULT - HISTORY OF PRESENT ILLNESS
This is a 89 y/o female with PMH of CHF, HTN, HLD, hypothyroidism, breast cancer, Raynaulds, asthma, pulmonary nodule, c/o feeling tired, more fatigue than normal and some SOB when walking, AMRIK 3/10/22 revealed moderate MS and severe AS, cardiac cath 3/31/22 showed pRCA 20% stenosis, distal circumflex 60% stenosis, pLAD 40%  first diagonal mild atherosclerosis, proximal circumflex with moderate atherosclerosis, she presents today for  TAVR 4/18/22  covid swab to be done later today at [facility]  denies recent travel or covid infection

## 2022-04-18 ENCOUNTER — INPATIENT (INPATIENT)
Facility: HOSPITAL | Age: 87
LOS: 0 days | Discharge: HOME HEALTH | DRG: 267 | End: 2022-04-19
Attending: STUDENT IN AN ORGANIZED HEALTH CARE EDUCATION/TRAINING PROGRAM | Admitting: STUDENT IN AN ORGANIZED HEALTH CARE EDUCATION/TRAINING PROGRAM
Payer: MEDICARE

## 2022-04-18 ENCOUNTER — APPOINTMENT (OUTPATIENT)
Dept: CARDIOTHORACIC SURGERY | Facility: HOSPITAL | Age: 87
End: 2022-04-18

## 2022-04-18 VITALS
HEART RATE: 64 BPM | DIASTOLIC BLOOD PRESSURE: 75 MMHG | HEIGHT: 57 IN | WEIGHT: 114.2 LBS | RESPIRATION RATE: 18 BRPM | SYSTOLIC BLOOD PRESSURE: 177 MMHG | OXYGEN SATURATION: 97 % | TEMPERATURE: 97 F

## 2022-04-18 DIAGNOSIS — Z90.722 ACQUIRED ABSENCE OF OVARIES, BILATERAL: Chronic | ICD-10-CM

## 2022-04-18 DIAGNOSIS — Z95.2 PRESENCE OF PROSTHETIC HEART VALVE: ICD-10-CM

## 2022-04-18 DIAGNOSIS — Z95.0 PRESENCE OF CARDIAC PACEMAKER: ICD-10-CM

## 2022-04-18 DIAGNOSIS — I35.0 NONRHEUMATIC AORTIC (VALVE) STENOSIS: ICD-10-CM

## 2022-04-18 LAB
GLUCOSE BLDC GLUCOMTR-MCNC: 95 MG/DL — SIGNIFICANT CHANGE UP (ref 70–99)
RH IG SCN BLD-IMP: NEGATIVE — SIGNIFICANT CHANGE UP

## 2022-04-18 PROCEDURE — 71045 X-RAY EXAM CHEST 1 VIEW: CPT | Mod: 26

## 2022-04-18 PROCEDURE — 33361 REPLACE AORTIC VALVE PERQ: CPT | Mod: 62,Q0

## 2022-04-18 PROCEDURE — 93306 TTE W/DOPPLER COMPLETE: CPT | Mod: 26

## 2022-04-18 PROCEDURE — 33274 TCAT INSJ/RPL PERM LDLS PM: CPT | Mod: Q0

## 2022-04-18 PROCEDURE — 99024 POSTOP FOLLOW-UP VISIT: CPT

## 2022-04-18 PROCEDURE — 93010 ELECTROCARDIOGRAM REPORT: CPT

## 2022-04-18 DEVICE — INTRO CATH MICRA 23FR: Type: IMPLANTABLE DEVICE | Status: FUNCTIONAL

## 2022-04-18 DEVICE — VALVE TAVR EVOLUT PROPLUS 26MM: Type: IMPLANTABLE DEVICE | Status: FUNCTIONAL

## 2022-04-18 DEVICE — WIRE GD SAFARI2 275CM XSML CRV: Type: IMPLANTABLE DEVICE | Status: FUNCTIONAL

## 2022-04-18 DEVICE — SUT PERCLOSE PROGLIDE 6FR: Type: IMPLANTABLE DEVICE | Status: FUNCTIONAL

## 2022-04-18 DEVICE — GWIRE GUID  0.035INX150CM: Type: IMPLANTABLE DEVICE | Status: FUNCTIONAL

## 2022-04-18 DEVICE — WIRE GUIDE EXCHANGE J TIP 3MM X 260CM: Type: IMPLANTABLE DEVICE | Status: FUNCTIONAL

## 2022-04-18 DEVICE — SHEATH INTRODUCER TERUMO PINNACLE CORONARY 6FR X 10CM X 0.038" MINI WIRE: Type: IMPLANTABLE DEVICE | Status: FUNCTIONAL

## 2022-04-18 DEVICE — CATH VASCULAR EXPO VENTRICULAR PIGTAIL CURVE (PIG 145) 0.045" X 5FR X 10CM: Type: IMPLANTABLE DEVICE | Status: FUNCTIONAL

## 2022-04-18 DEVICE — IMPLANTABLE DEVICE: Type: IMPLANTABLE DEVICE | Status: FUNCTIONAL

## 2022-04-18 DEVICE — CATH VASCULAR EXPO VENTRICULAR PIGTAIL CURVE (PIG) 0.045" X 5FR X 100CM: Type: IMPLANTABLE DEVICE | Status: FUNCTIONAL

## 2022-04-18 DEVICE — GWIRE STR .038X180 STIFF LONG TAPR: Type: IMPLANTABLE DEVICE | Status: FUNCTIONAL

## 2022-04-18 DEVICE — INTRODUCER SHEATH DRYSEAL FLEX 18FR X 33CM: Type: IMPLANTABLE DEVICE | Status: FUNCTIONAL

## 2022-04-18 DEVICE — CATH VASCULAR EXPO EXPO AMPLATZ LEFT CURVE (AL1) 0.045" X 5FR X 100CM: Type: IMPLANTABLE DEVICE | Status: FUNCTIONAL

## 2022-04-18 DEVICE — PACING CATH PACEL VENTRICULAR RIGHT HEART CURVE 4FR: Type: IMPLANTABLE DEVICE | Status: FUNCTIONAL

## 2022-04-18 DEVICE — LOADING SYSTEM EVOLUT PRO 23-29MM: Type: IMPLANTABLE DEVICE | Status: FUNCTIONAL

## 2022-04-18 DEVICE — SYS DELIVERY EVOLUT PROPLUS 23/26/29 MM: Type: IMPLANTABLE DEVICE | Status: FUNCTIONAL

## 2022-04-18 DEVICE — SHEATH INTRODUCER TERUMO PINNACLE CORONARY 8FR X 10CM X 0.038" MINI WIRE: Type: IMPLANTABLE DEVICE | Status: FUNCTIONAL

## 2022-04-18 DEVICE — GUIDEWIRE AMPLATZ EXTRA-STIFF CURVED .035" X 260CM: Type: IMPLANTABLE DEVICE | Status: FUNCTIONAL

## 2022-04-18 DEVICE — CATH VASCULAR EXPO FEMORAL RIGHT CURVE (FR5) 0.045" X 5FR X 10OCM: Type: IMPLANTABLE DEVICE | Status: FUNCTIONAL

## 2022-04-18 DEVICE — ANGIOSEAL VASC CLOS VIP 6FR: Type: IMPLANTABLE DEVICE | Status: FUNCTIONAL

## 2022-04-18 RX ORDER — CEFUROXIME AXETIL 250 MG
1500 TABLET ORAL EVERY 8 HOURS
Refills: 0 | Status: COMPLETED | OUTPATIENT
Start: 2022-04-18 | End: 2022-04-19

## 2022-04-18 RX ORDER — LOSARTAN POTASSIUM 100 MG/1
1 TABLET, FILM COATED ORAL
Qty: 0 | Refills: 0 | DISCHARGE

## 2022-04-18 RX ORDER — SODIUM CHLORIDE 9 MG/ML
3 INJECTION INTRAMUSCULAR; INTRAVENOUS; SUBCUTANEOUS EVERY 8 HOURS
Refills: 0 | Status: DISCONTINUED | OUTPATIENT
Start: 2022-04-18 | End: 2022-04-18

## 2022-04-18 RX ORDER — LEVOTHYROXINE SODIUM 125 MCG
1 TABLET ORAL
Qty: 0 | Refills: 0 | DISCHARGE

## 2022-04-18 RX ORDER — ASPIRIN/CALCIUM CARB/MAGNESIUM 324 MG
1 TABLET ORAL
Qty: 0 | Refills: 0 | DISCHARGE

## 2022-04-18 RX ORDER — ASPIRIN/CALCIUM CARB/MAGNESIUM 324 MG
81 TABLET ORAL DAILY
Refills: 0 | Status: DISCONTINUED | OUTPATIENT
Start: 2022-04-18 | End: 2022-04-19

## 2022-04-18 RX ORDER — LIDOCAINE HCL 20 MG/ML
0.2 VIAL (ML) INJECTION ONCE
Refills: 0 | Status: DISCONTINUED | OUTPATIENT
Start: 2022-04-18 | End: 2022-04-18

## 2022-04-18 RX ORDER — LOVASTATIN 20 MG
20 TABLET ORAL AT BEDTIME
Refills: 0 | Status: DISCONTINUED | OUTPATIENT
Start: 2022-04-18 | End: 2022-04-19

## 2022-04-18 RX ORDER — CHLORHEXIDINE GLUCONATE 213 G/1000ML
1 SOLUTION TOPICAL ONCE
Refills: 0 | Status: DISCONTINUED | OUTPATIENT
Start: 2022-04-18 | End: 2022-04-18

## 2022-04-18 RX ORDER — LEVOTHYROXINE SODIUM 125 MCG
50 TABLET ORAL DAILY
Refills: 0 | Status: DISCONTINUED | OUTPATIENT
Start: 2022-04-18 | End: 2022-04-19

## 2022-04-18 RX ORDER — METOPROLOL TARTRATE 50 MG
1 TABLET ORAL
Qty: 0 | Refills: 0 | DISCHARGE

## 2022-04-18 RX ORDER — AMLODIPINE BESYLATE 2.5 MG/1
10 TABLET ORAL DAILY
Refills: 0 | Status: DISCONTINUED | OUTPATIENT
Start: 2022-04-18 | End: 2022-04-19

## 2022-04-18 RX ORDER — METHENAMINE MANDELATE 1 G
1 TABLET ORAL
Qty: 0 | Refills: 0 | DISCHARGE

## 2022-04-18 RX ORDER — AMLODIPINE BESYLATE 2.5 MG/1
1 TABLET ORAL
Qty: 0 | Refills: 0 | DISCHARGE

## 2022-04-18 RX ADMIN — Medication 81 MILLIGRAM(S): at 16:51

## 2022-04-18 RX ADMIN — Medication 20 MILLIGRAM(S): at 21:32

## 2022-04-18 RX ADMIN — Medication 100 MILLIGRAM(S): at 16:51

## 2022-04-18 RX ADMIN — AMLODIPINE BESYLATE 10 MILLIGRAM(S): 2.5 TABLET ORAL at 16:51

## 2022-04-18 NOTE — PROGRESS NOTE ADULT - PROBLEM SELECTOR PLAN 1
S/p TAVR   ASA only   TELE   Daily EKG  ECHO in am  XRAY PA/ lateral for PPM in am   resume synthroid lovastatin   Losartan 100 review bp before resuming   Discharge home  Tuesday

## 2022-04-18 NOTE — PATIENT PROFILE ADULT - FALL HARM RISK - HARM RISK INTERVENTIONS

## 2022-04-18 NOTE — CHART NOTE - NSCHARTNOTEFT_GEN_A_CORE
HPI:  This is a 88 y/o female with PMH of CHF, HTN, HLD, hypothyroidism, breast cancer, Raynaulds, asthma, pulmonary nodule, c/o feeling tired, more fatigue than normal and some SOB when walking, AMRIK 3/10/22 revealed moderate MS and severe AS, cardiac cath 3/31/22 showed pRCA 20% stenosis, distal circumflex 60% stenosis, pLAD 40%  first diagonal mild atherosclerosis, proximal circumflex with moderate atherosclerosis, she presents today for  TAVR 4/18/22  covid swab to be done later today at [facility]  denies recent travel or covid infection (15 Apr 2022 10:31)    Pt. s/p TAVR via RFA s/p Perclose x 2; LFV manual compression, LFA s/p angioseal.  Pt. s/p MICRA ppm via RFV purse string suture in place to be removed 4/19 AM.  Pt. converted from moderate sedation to general anesthesia.  IV tylenol given, Zinacef 2 more doses.  ASA only at 1600 qd.

## 2022-04-18 NOTE — PROGRESS NOTE ADULT - ASSESSMENT
This is a 89 y/o female with PMH of CHF, HTN, HLD, hypothyroidism, breast cancer, Raynaulds, asthma, pulmonary nodule, c/o feeling tired, more fatigue than normal and some SOB when walking, AMRIK 3/10/22 revealed moderate MS and severe AS, cardiac cath 3/31/22 showed pRCA 20% stenosis, distal circumflex 60% stenosis, pLAD 40%  first diagonal mild atherosclerosis, proximal circumflex with moderate atherosclerosis,.  4/18 Underwent  TAVR   Pt developed complete HB after TAVR deployment.  EPS consulted.  Decision made for micra placement. Recovered in PACU fluid resuscitation and albumin given . Stabilized and transferred to [address] [place] [address] XRAY for PPM pending. ECHO in am Pa/lateral in am daily EKG. Likly discharge home Tuesday

## 2022-04-18 NOTE — PRE-ANESTHESIA EVALUATION ADULT - NSANTHPEFT_GEN_ALL_CORE
GENERAL: NAD  HEAD:  Atraumatic, Normocephalic  CHEST/LUNG: Clear to auscultation bilaterally  HEART: Normal S1/S2; +systolic murmur  PSYCH: AAOx3  NEUROLOGY: non-focal  SKIN: No obvious rashes or lesions

## 2022-04-18 NOTE — PRE-ANESTHESIA EVALUATION ADULT - NSANTHPMHFT_GEN_ALL_CORE
89 y/o female with PMH of CHF, HTN, HLD, hypothyroidism, breast cancer, Raynaulds, asthma, pulmonary nodule, Central retinal vein occlusion of right eye (decreased vision of right eye) ,c/o feeling tired, more fatigue than normal and some SOB when walking, AMRIK 3/10/22 revealed moderate MS and severe AS, cardiac cath 3/31/22 showed pRCA 20% stenosis, distal circumflex 60% stenosis, pLAD 40%  first diagonal mild atherosclerosis, proximal circumflex with moderate atherosclerosis,

## 2022-04-18 NOTE — PRE-ANESTHESIA EVALUATION ADULT - LAST ECHOCARDIOGRAM
3/10/2022 EF 65-70%, Mod MS, JOCELYNN 1.6, MG 7. SEVERE AS - low flow low gradient, JOCELYNN 0.87, MG 18; normal RV function

## 2022-04-19 ENCOUNTER — TRANSCRIPTION ENCOUNTER (OUTPATIENT)
Age: 87
End: 2022-04-19

## 2022-04-19 ENCOUNTER — RX RENEWAL (OUTPATIENT)
Age: 87
End: 2022-04-19

## 2022-04-19 VITALS — WEIGHT: 114.2 LBS

## 2022-04-19 LAB
ANION GAP SERPL CALC-SCNC: 14 MMOL/L — SIGNIFICANT CHANGE UP (ref 5–17)
BUN SERPL-MCNC: 17 MG/DL — SIGNIFICANT CHANGE UP (ref 7–23)
CALCIUM SERPL-MCNC: 9.1 MG/DL — SIGNIFICANT CHANGE UP (ref 8.4–10.5)
CHLORIDE SERPL-SCNC: 105 MMOL/L — SIGNIFICANT CHANGE UP (ref 96–108)
CO2 SERPL-SCNC: 22 MMOL/L — SIGNIFICANT CHANGE UP (ref 22–31)
CREAT SERPL-MCNC: 0.66 MG/DL — SIGNIFICANT CHANGE UP (ref 0.5–1.3)
EGFR: 84 ML/MIN/1.73M2 — SIGNIFICANT CHANGE UP
GLUCOSE SERPL-MCNC: 104 MG/DL — HIGH (ref 70–99)
HCT VFR BLD CALC: 30.7 % — LOW (ref 34.5–45)
HGB BLD-MCNC: 9.4 G/DL — LOW (ref 11.5–15.5)
MCHC RBC-ENTMCNC: 18.5 PG — LOW (ref 27–34)
MCHC RBC-ENTMCNC: 30.6 GM/DL — LOW (ref 32–36)
MCV RBC AUTO: 60.6 FL — LOW (ref 80–100)
NRBC # BLD: 0 /100 WBCS — SIGNIFICANT CHANGE UP (ref 0–0)
PLATELET # BLD AUTO: 169 K/UL — SIGNIFICANT CHANGE UP (ref 150–400)
POTASSIUM SERPL-MCNC: 3.9 MMOL/L — SIGNIFICANT CHANGE UP (ref 3.5–5.3)
POTASSIUM SERPL-SCNC: 3.9 MMOL/L — SIGNIFICANT CHANGE UP (ref 3.5–5.3)
RBC # BLD: 5.07 M/UL — SIGNIFICANT CHANGE UP (ref 3.8–5.2)
RBC # FLD: 17.9 % — HIGH (ref 10.3–14.5)
SODIUM SERPL-SCNC: 141 MMOL/L — SIGNIFICANT CHANGE UP (ref 135–145)
WBC # BLD: 11.36 K/UL — HIGH (ref 3.8–10.5)
WBC # FLD AUTO: 11.36 K/UL — HIGH (ref 3.8–10.5)

## 2022-04-19 PROCEDURE — 99024 POSTOP FOLLOW-UP VISIT: CPT

## 2022-04-19 PROCEDURE — 93306 TTE W/DOPPLER COMPLETE: CPT | Mod: 26

## 2022-04-19 PROCEDURE — 71046 X-RAY EXAM CHEST 2 VIEWS: CPT | Mod: 26

## 2022-04-19 PROCEDURE — 99239 HOSP IP/OBS DSCHRG MGMT >30: CPT

## 2022-04-19 PROCEDURE — 93010 ELECTROCARDIOGRAM REPORT: CPT

## 2022-04-19 RX ORDER — ACETAMINOPHEN 500 MG
650 TABLET ORAL EVERY 6 HOURS
Refills: 0 | Status: DISCONTINUED | OUTPATIENT
Start: 2022-04-19 | End: 2022-04-19

## 2022-04-19 RX ORDER — ACETAMINOPHEN 500 MG
2 TABLET ORAL
Qty: 0 | Refills: 0 | DISCHARGE
Start: 2022-04-19

## 2022-04-19 RX ORDER — POTASSIUM CHLORIDE 20 MEQ
20 PACKET (EA) ORAL ONCE
Refills: 0 | Status: COMPLETED | OUTPATIENT
Start: 2022-04-19 | End: 2022-04-19

## 2022-04-19 RX ORDER — LANOLIN ALCOHOL/MO/W.PET/CERES
3 CREAM (GRAM) TOPICAL AT BEDTIME
Refills: 0 | Status: DISCONTINUED | OUTPATIENT
Start: 2022-04-19 | End: 2022-04-19

## 2022-04-19 RX ADMIN — Medication 20 MILLIEQUIVALENT(S): at 08:10

## 2022-04-19 RX ADMIN — Medication 3 MILLIGRAM(S): at 03:50

## 2022-04-19 RX ADMIN — Medication 650 MILLIGRAM(S): at 02:40

## 2022-04-19 RX ADMIN — Medication 50 MICROGRAM(S): at 05:03

## 2022-04-19 RX ADMIN — Medication 81 MILLIGRAM(S): at 08:10

## 2022-04-19 RX ADMIN — Medication 100 MILLIGRAM(S): at 00:21

## 2022-04-19 RX ADMIN — AMLODIPINE BESYLATE 10 MILLIGRAM(S): 2.5 TABLET ORAL at 05:03

## 2022-04-19 RX ADMIN — Medication 650 MILLIGRAM(S): at 02:10

## 2022-04-19 NOTE — DISCHARGE NOTE PROVIDER - NSDCPNSUBOBJ_GEN_ALL_CORE
Vital Signs Last 24 Hrs  T(C): 36.7 (19 Apr 2022 04:36), Max: 36.7 (18 Apr 2022 12:02)  T(F): 98.1 (19 Apr 2022 04:36), Max: 98.1 (18 Apr 2022 12:02)  HR: 60 (19 Apr 2022 04:36) (56 - 69)  BP: 111/54 (19 Apr 2022 04:36) (107/55 - 144/55)  BP(mean): 72 (18 Apr 2022 20:27) (72 - 72)  RR: 18 (19 Apr 2022 04:36) (16 - 22)  SpO2: 98% (19 Apr 2022 04:36) (92% - 98%)      PHYSICAL EXAM  Neurology: A&Ox3, NAD  CV : RRR+S1S2  Lungs: Respirations non-labored, B/L BS CTA  Abdomen: Soft, NT/ND, +BSx4Q  : Voiding without difficulty  Extremities: B/L LE no edema, negative calf tenderness, +PP                     B/L groins soft CDI MARY, no bleeding, no hematoma    45 minutes face to face spent on total encounter, patient counseling and discharge instructions.

## 2022-04-19 NOTE — DISCHARGE NOTE PROVIDER - NSDCMRMEDTOKEN_GEN_ALL_CORE_FT
amLODIPine 10 mg oral tablet: 1 tab(s) orally once a day  aspirin 81 mg oral delayed release tablet: 1 tab(s) orally once a day (at bedtime)  levothyroxine 50 mcg (0.05 mg) oral tablet: 1 tab(s) orally once a day  losartan 100 mg oral tablet: 1 tab(s) orally once a day  lovastatin 20 mg oral tablet: 1 tab(s) orally once a day  methenamine hippurate 1 g oral tablet: 1 tab(s) orally 2 times a day  Toprol-XL 50 mg oral tablet, extended release: 1 tab(s) orally once a day   acetaminophen 325 mg oral tablet: 2 tab(s) orally every 6 hours, As needed, Mild Pain (1 - 3)  amLODIPine 10 mg oral tablet: 1 tab(s) orally once a day  aspirin 81 mg oral delayed release tablet: 1 tab(s) orally once a day (at bedtime)  levothyroxine 50 mcg (0.05 mg) oral tablet: 1 tab(s) orally once a day  losartan 100 mg oral tablet: 1 tab(s) orally once a day  lovastatin 20 mg oral tablet: 1 tab(s) orally once a day  methenamine hippurate 1 g oral tablet: 1 tab(s) orally 2 times a day  Toprol-XL 50 mg oral tablet, extended release: 1 tab(s) orally once a day

## 2022-04-19 NOTE — DISCHARGE NOTE NURSING/CASE MANAGEMENT/SOCIAL WORK - PATIENT PORTAL LINK FT
You can access the FollowMyHealth Patient Portal offered by [facility] by registering at the following website: [email or web address]. By joining [employer]’s FollowMyHealth portal, you will also be able to view your health information using other applications (apps) compatible with our system.

## 2022-04-19 NOTE — DISCHARGE NOTE PROVIDER - CARE PROVIDERS DIRECT ADDRESSES
,[email or web address] ,[email or web address] [email or web address] Hospital[facility] [employer].[facility]

## 2022-04-19 NOTE — DISCHARGE NOTE PROVIDER - NSDCFUADDAPPT_GEN_ALL_CORE_FT
Follow up with Dr. Boyd on Monday 4/25 at 12:30pm in CTS office at [facility], call [phone] to confirm appointment.  Follow up in EP clinic for wound check as scheduled on 4/29/22 at 10:40 am at [facility]. Call [phone] to confirm appointment.  Follow up with Cardiologist Dr. Lazar in 30 days for repeat echocardiogram, please call [phone] to schedule an appointment.  Follow up with your PCP in 2 weeks, call the office to schedule an appointment.

## 2022-04-19 NOTE — PROGRESS NOTE ADULT - ASSESSMENT
88 y/o female with PMH of CHF, HTN, HLD, hypothyroidism, breast cancer, Raynauds, asthma, pulmonary nodule, c/o feeling tired, more fatigue than normal and some SOB when walking, AMRIK 3/10/22 revealed moderate MS and severe AS, cardiac cath 3/31/22 showed pRCA 20% stenosis, distal circumflex 60% stenosis, pLAD 40%  first diagonal mild atherosclerosis, proximal circumflex with moderate atherosclerosis. 4/18 Underwent TAVR, developed complete HB after TAVR deployment.  EPS consulted. Decision made for micra placement.    1. CHB    - s/p Medtronic Micra AV on 4/18/22 by Dr. Azar  - Interrogation pending by device rep  - Await CXR this AM  - Post PPM teaching enforced with patient  - PPM booklet, Fact sheet, Temp ID card and follow up appointment card given to the patient.   - Patient to follow up in EP clinic for wound check as scheduled on 4/29/22 at 10:40 am.   - Can discharge home today from EP perspective after CXR/interrogation.   89 y/o female with PMH of CHF, HTN, HLD, hypothyroidism, breast cancer, Raynauds, asthma, pulmonary nodule, c/o feeling tired, more fatigue than normal and some SOB when walking, AMRIK 3/10/22 revealed moderate MS and severe AS, cardiac cath 3/31/22 showed pRCA 20% stenosis, distal circumflex 60% stenosis, pLAD 40%  first diagonal mild atherosclerosis, proximal circumflex with moderate atherosclerosis. 4/18 Underwent TAVR, developed complete HB after TAVR deployment.  EPS consulted. Decision made for micra placement.    1. CHB    - s/p Medtronic Micra AV on 4/18/22 by Dr. Azar  - Interrogation performed by device rep with normal PPM function  - CXR with adequate Micra location  - Post PPM teaching enforced with patient  - PPM booklet, Fact sheet, Temp ID card and follow up appointment card given to the patient.   - Patient to follow up in EP clinic for wound check as scheduled on 4/29/22 at 10:40 am.   - Can discharge home today from EP perspective  - EP to sign off, reconsult as needed

## 2022-04-19 NOTE — DISCHARGE NOTE PROVIDER - NSDCCPCAREPLAN_GEN_ALL_CORE_FT
PRINCIPAL DISCHARGE DIAGNOSIS  Diagnosis: S/P TAVR (transcatheter aortic valve replacement)  Assessment and Plan of Treatment: 1. Daily Shower  2. Weight yourself daily.  3. Regular diet - low fat, low cholesterol, no added salt.  4. Increase Activity as tolerated.  5. Be sure to let your dentist/physician know about your valve and assess the need for prophylactic antibiotics before any invasive procedures.

## 2022-04-19 NOTE — PROGRESS NOTE ADULT - NS_MD_PANP_GEN_ALL_CORE
VACCINE ADMINISTRATION RECORD  PARENT / GUARDIAN APPROVAL  Date: 2024  Vaccine administered to: Ronnell Barbosa     : 2023    MRN: [id number]    A copy of the appropriate Centers for Disease Control and Prevention Vaccine Information statement has been provided. I have read or have had explained the information about the diseases and the vaccines listed below. There was an opportunity to ask questions and any questions were answered satisfactorily. I believe that I understand the benefits and risks of the vaccine cited and ask that the vaccine(s) listed below be given to me or to the person named above (for whom I am authorized to make this request).    VACCINES ADMINISTERED:  DTaP 4 and HEP A 2    I have read and hereby agree to be bound by the terms of this agreement as stated above. My signature is valid until revoked by me in writing.  This document is signed by , relationship: Father and Mother on 2024.:                                                                                                                                         Parent / Guardian Signature                                                Date    Myah [place] MA served as a witness to authentication that the identity of the person signing electronically is in fact the person represented as signing.      
Attending and PA/NP shared services statement (NON-critical care):

## 2022-04-19 NOTE — PROGRESS NOTE ADULT - SUBJECTIVE AND OBJECTIVE BOX
Pt in complete HB after TAVR deployment.  EPS consulted.  Decision made for micra placement.  D/w pt's daughter in law Meryle as pt is under general anesthesia who agree to proceed with micra placement. 
Subjective " my whole body feels tight "    VITAL SIGNS    Telemetry:  NSR  60s    Vital Signs Last 24 Hrs  T(C): 36.7 (04-18-22 @ 12:02), Max: 36.7 (04-18-22 @ 12:02)  T(F): 98.1 (04-18-22 @ 12:02), Max: 98.1 (04-18-22 @ 12:02)  HR: 69 (04-18-22 @ 12:02) (56 - 76)  BP: 144/55 (04-18-22 @ 12:02) (120/69 - 177/75)  RR: 18 (04-18-22 @ 12:02) (16 - 22)  SpO2: 92% (04-18-22 @ 12:02) (92% - 97%)           04-18 @ 07:01  -  04-18 @ 12:14  --------------------------------------------------------  IN: 0 mL / OUT: 200 mL / NET: -200 mL    MEDICATIONS  (STANDING):  aspirin enteric coated 81 milliGRAM(s) Oral daily  cefuroxime  IVPB 1500 milliGRAM(s) IV Intermittent every 8 hours  cefuroxime  IVPB 1500 milliGRAM(s) IV Intermittent once    MEDICATIONS  (PRN):    CAPILLARY BLOOD GLUCOSE      POCT Blood Glucose.: 95 mg/dL (18 Apr 2022 06:45)                              PHYSICAL EXAM        Neurology: alert and oriented x 3, nonfocal, no gross deficits    CV :[id number]  Lungs: B/l cta on 2 LNC    Abdomen: soft, nontender, nondistended, positive bowel sounds, last bowel movement preop     :  Primifit voiding            Extremities: equal strength throughout    B/l groin site CDI   non tender no eccymosis  b/lle warm well perfused + DP no edema                                           Discussed with Cardiothoracic Team at AM rounds.
This patient has been implanted with a Transcatheter Aortic Valve Implant under the following NCT/KAILA:    TAVR:  Commercial Implant NCT [id number], KAILA N/A and will be placed into the TVT Registry.    BERRY Guzman  [id number]
*****Structural Heart Team*****    Subjective:    Patient resting comfortably in a chair, offering no complaints.      PAST MEDICAL & SURGICAL HISTORY:  Benign essential hypertension    Anxiety  no med    Hypothyroid    Hyperlipidemia    Aortic stenosis    Central retinal vein occlusion of right eye    Breast cancer  right  breast cancer  1990    Asthma  no med    Uterine prolapse    H/O Raynaud&#x27;s syndrome    S/P lumpectomy of breast  right    S/P bilateral oophorectomy          T(C): 36.7 (04-19-22 @ 04:36), Max: 36.7 (04-19-22 @ 04:36)  HR: 60 (04-19-22 @ 04:36) (60 - 61)  BP: 111/54 (04-19-22 @ 04:36) (107/55 - 111/54)  RR: 18 (04-19-22 @ 04:36) (18 - 18)  SpO2: 98% (04-19-22 @ 04:36) (98% - 98%)  Wt(kg): --  04-18 @ 07:01  -  04-19 @ 07:00  --------------------------------------------------------  IN: 550 mL / OUT: 700 mL / NET: -150 mL    04-19 @ 07:01  -  04-19 @ 12:09  --------------------------------------------------------  IN: 240 mL / OUT: 0 mL / NET: 240 mL      MEDICATIONS  (STANDING):  amLODIPine   Tablet 10 milliGRAM(s) Oral daily  aspirin enteric coated 81 milliGRAM(s) Oral daily  cefuroxime  IVPB 1500 milliGRAM(s) IV Intermittent once  levothyroxine 50 MICROGram(s) Oral daily  lovastatin 20 milliGRAM(s) Oral at bedtime  melatonin 3 milliGRAM(s) Oral at bedtime    MEDICATIONS  (PRN):  acetaminophen     Tablet .. 650 milliGRAM(s) Oral every 6 hours PRN Mild Pain (1 - 3)      Review of Symptoms:  Constitutional: Awake, Alert, Follows commands  Respiratory: Denies  Cardiac: Denies CP, Denies Palpitations  Gastrointestinal: Denies Pain, Denies N/V, tolerating po intake  Vascular: Negative  Extremities: No Edema, No joint pain or swelling  Neurological: Negative  Endocrine: No heat or cold intolerance, No excessive thirst  Heme/Onc: Negative    Exam:  General: A/Ox3, JEANNETTE, NAD  HEENT: Supple, No JVD, Trachea midline, no masses  Pulmonary: CTAB, = Chest Excursion, no accessory muscle use  Cor: S1S2, RRR, No murmur noted  ECG: SR with intermittent pacing  Groin/Wound: B/L soft, NT, No hematoma  Gastrointestinal: Soft, NT/ND, + Bowel Sounds  Neuro: = motor and sensory B/L, No focal deficits  Vascular: 1+ pulses B/L, No Edema  Extremities: No joint pain or swelling  Skin: Warm/Dry/Normal color, Normal turgor, no rashes                          9.4    11.36 )-----------( 169      ( 19 Apr 2022 05:48 )             30.7   04-19    141  |  105  |  17  ----------------------------<  104<H>  3.9   |  22  |  0.66    Ca    9.1      19 Apr 2022 05:48        Imaging Reviewed:    Echocardiogram:    < from: Transthoracic Echocardiogram (04.19.22 @ 07:31) >  OBSERVATIONS:  Mitral Valve: Mitral annular, leaflet, and chordal  calcification.  Mild mitral stenosis. Mean gradient 4.5 mmHg at  54/min.  Mild mitral regurgitation.  Aortic Root: Normal aortic root size.  Aortic Valve: Transcatheter aortic valve with normal  gradient.  Mild paravalvular aortic regurgitation from the anterior  aspect of the annulus.  Left Atrium: Severely dilated left atrium.  Left Ventricle: Normal left ventricular internal dimensions  and wall thicknesses.  Normal left ventricular systolic function. No segmental  wall motion abnormalities.  Right Heart: Normal right atrium. Normal right ventricular  size and function.  Normal tricuspid valve. Mild-moderate tricuspid  regurgitation.  Normal pulmonic valve. Mild pulmonic regurgitation.  Pericardium/PleuraNormal pericardium with no pericardial  effusion.  Hemodynamic: Estimated right atrial pressure is normal.  Mild pulmonary hypertension. Estimated PASP 40 mmHg.  ------------------------------------------------------------------------  CONCLUSIONS:  Normal left ventricular systolic function. No segmental  wall motion abnormalities.  Transcatheter aortic valve with normal gradient. Mild  paravalvular aortic regurgitation from the anterior aspect  of the annulus.  Mild mitral stenosis due to annularcalcification.  Mild pulmonary hypertension.  ------------------------------------------------------------------------  PROCEDURE DESCRIPTION: Transthoracic echocardiogram with  2-D, M-Mode and complete spectral and color flow Doppler.  ------------------------------------------------------------------------  ECHOCARDIOGRAPHIC EXAMINATION:  AORTIC ROOT:  Aortic Root (Leaflet): 3.2 cm  Aortic Root Index: 1.0  LEFT ATRIUM:  AP Dimensions: 4.3 cm  LA Volume Index: 80.00 cc/sqm  LA Volume: 114.0 cc  LEFT VENTRICLE:  LVIDd: 4.0 cm  LVIDs: 2.3 cm  Fraction Short: 43 %  IVS: 1.30  ILWT: 1.1 cm  RWT: 0.55  LV Mass: 166.0 gm  LV Mass Index: 117 gm/sqm  EF (Visual Estimate): 70%  HR and BP:  BP: 111/54  BSA: 1.42  TRICUSPID VALVE:  TR Peak Velocity: 3.0 M/s  ------------------------------------------------------------------------  HEMODYNAMICS:  RA Pressure Estimate: 8  IVRT: 81 ms  ------------------------------------------------------------------------  COLOR FLOW and SPECIAL DOPPLER:  AORTIC VALVE:  AV Peak Velocity: 1.9 M/sec  AV Peak Gradient: 15 mm Hg  AV Mean Gradient: 7 mm Hg  Valve Area: 1.2 sqcm  LVOT:  LVOT Velocity: .9 M/sec  LVOT Diameter: 1.7 cm  LVOT Peak Gradient Rest: 3 mm Hg  LVOT Mean Gradient Rest: 2 mm Hg  MITRAL VALVE:  MVPeak Gradient: 15 mm Hg  MV Mean Gradient: 5 mm Hg  ------------------------------------------------------------------------  DIASTOLIC FUNCTION:  DT:511 ms  E/A: 0.80  e' Septal: 3.0 cm/s  E/e' Septal: 56.6  e' Lateral: 5.0 cm/s  E/e' Lateral: 34.0  MV E wave: 1.7 m/s  MV A wave: 2.1 m/s  Septal a': 5.0 cm/s  Lateral a': 7.0 cm/s    < end of copied text >          Assesment/Plan:  89 Female s/p TAVR for severe AS, Post op Complete Heart Block s/p Micra PPM placement  1.) S/P TAVR: ASA 81 mg po daily, Continue to Monitor Groins. Ambulate as tolerated. Post op TTE reviewed.  2.) HTN: Restart Amlodipine and Losartan  3.) Post op PPM: Patient shoud have follow up per EPS  4.) Ambulate as tolerated  5.) Discharge Plan: Patient is to follow up with Dr. Boyd  in 1 week post discharge. She should then follow up with the Valve Clinic  in 30 days, with a repeat Transthoracic Echo to be done at that visit.    BERRY Hernandez  [id number]
24H hour events: Patient feels well and denies complaints    MEDICATIONS:  amLODIPine   Tablet 10 milliGRAM(s) Oral daily  aspirin enteric coated 81 milliGRAM(s) Oral daily  cefuroxime  IVPB 1500 milliGRAM(s) IV Intermittent once  acetaminophen     Tablet .. 650 milliGRAM(s) Oral every 6 hours PRN  melatonin 3 milliGRAM(s) Oral at bedtime  levothyroxine 50 MICROGram(s) Oral daily  lovastatin 20 milliGRAM(s) Oral at bedtime        REVIEW OF SYSTEMS:  Complete 10point ROS negative.    PHYSICAL EXAM:  T(C): 36.7 (04-19-22 @ 04:36), Max: 36.7 (04-18-22 @ 12:02)  HR: 60 (04-19-22 @ 04:36) (56 - 76)  BP: 111/54 (04-19-22 @ 04:36) (107/55 - 151/73)  RR: 18 (04-19-22 @ 04:36) (16 - 22)  SpO2: 98% (04-19-22 @ 04:36) (92% - 98%)  Wt(kg): --  I&O's Summary    18 Apr 2022 07:01  -  19 Apr 2022 07:00  --------------------------------------------------------  IN: 550 mL / OUT: 700 mL / NET: -150 mL    19 Apr 2022 07:01  -  19 Apr 2022 09:06  --------------------------------------------------------  IN: 240 mL / OUT: 0 mL / NET: 240 mL        Appearance: Normal	  Cardiovascular: Normal S1 S2, No JVD, No murmurs, No edema  Respiratory: Lungs clear to auscultation	  Psychiatry: A & O x 3, Mood & affect appropriate  Gastrointestinal:  Soft, Non-tender, + BS	  Skin: Groin dressing removed C/D/I, no bleeding, no hematoma, No rashes, No ecchymoses, No cyanosis	  Neurologic: Non-focal  Extremities: No clubbing, cyanosis or edema  Vascular: Peripheral pulses palpable 2+ bilaterally        LABS:	 	    CBC Full  -  ( 19 Apr 2022 05:48 )  WBC Count : 11.36 K/uL  Hemoglobin : 9.4 g/dL  Hematocrit : 30.7 %  Platelet Count - Automated : 169 K/uL  Mean Cell Volume : 60.6 fl  Mean Cell Hemoglobin : 18.5 pg  Mean Cell Hemoglobin Concentration : 30.6 gm/dL  Auto Neutrophil # : x  Auto Lymphocyte # : x  Auto Monocyte # : x  Auto Eosinophil # : x  Auto Basophil # : x  Auto Neutrophil % : x  Auto Lymphocyte % : x  Auto Monocyte % : x  Auto Eosinophil % : x  Auto Basophil % : x    04-19    141  |  105  |  17  ----------------------------<  104<H>  3.9   |  22  |  0.66    Ca    9.1      19 Apr 2022 05:48        TELEMETRY: SR with ventricular pacing 50-60s	          Ventricular Rate 74 BPM    Atrial Rate 86 BPM    QRS Duration 136 ms    Q-T Interval 452 ms    QTC Calculation(Bazett) 501 ms    P Axis 63 degrees    R Axis -77 degrees    T Axis 105 degrees    Diagnosis Line Ventricular-paced rhythm WITH FREQUENT sinus complexes  ABNORMAL ECG  WHEN COMPARED WITH ECG OF 31-MAR-2022 08:44,  THERE IS NOW MORE FREQUENT ECTOPY          < from: Xray Chest 1 View- PORTABLE-Urgent (Xray Chest 1 View- PORTABLE-Urgent .) (04.18.22 @ 13:12) >  INTERPRETATION:  EXAMINATION: XR CHEST URGENT    CLINICAL INDICATION: Status post transaortic valve replacement and Micra   pacemaker placement.    TECHNIQUE: Single frontal, portable view of the chest was obtained.    COMPARISON: Chest radiograph 4/15/2022    FINDINGS:    Interval transaortic valve replacement and Micra pacemaker placement.  Heart size and the mediastinum cannot be accurately evaluated on this   projection. Calcified mitral valve annulus. Thoracic aortic   calcifications.  Elevated right hemidiaphragm again seen.  Unchanged linear atelectasis versus scar in the apices. Otherwise clear   lungs.  There is no pneumothorax or pleural effusion.  No acute bony abnormality.    IMPRESSION:  Interval transaortic valve replacement and Micra pacemaker placement.  No pneumothorax.      	  ASSESSMENT/PLAN:

## 2022-04-19 NOTE — DISCHARGE NOTE PROVIDER - CARE PROVIDER_API CALL
Isaias Boyd)  Thoracic and Cardiac Surgery  [address] [place] [address]  Phone: [phone]  Fax: [phone]  Scheduled Appointment: 04/25/2022 12:30 PM   Isaias Boyd)  Thoracic and Cardiac Surgery  [address] [place] [address]  Phone: [phone]  Fax: [phone]  Scheduled Appointment: 04/25/2022 12:30 PM    Umer Lazar)  Interventional Cardiology  [address] [place] [address]  Phone: [phone]  Fax: [phone]  Follow Up Time: 1 month    Osmar Umanzor)  Critical Care Medicine  [address] [place] [address], [address] [place] [address]  Phone: [phone]  Fax: [phone]  Follow Up Time: 2 weeks    Xochilt Azar)  Cardiac Electrophysiology; Cardiovascular Disease; Internal Medicine  [address] [place] [address]  Phone: [phone]  Fax: [phone]  Scheduled Appointment: 04/29/2022 10:40 AM

## 2022-04-19 NOTE — DISCHARGE NOTE PROVIDER - NSDCFUSCHEDAPPT_GEN_ALL_CORE_FT
AUDIE NOLASCO ; 04/29/2022 ; NPP Cardio Electro [facility]  AUDIE NOLASCO ; 04/25/2022 ; NPP CT Surg [address] AUDIE Louise ; 04/29/2022 ; NPP Cardio Electro [address]

## 2022-04-19 NOTE — DISCHARGE NOTE NURSING/CASE MANAGEMENT/SOCIAL WORK - NSDCPEFALRISK_GEN_ALL_CORE
For information on Fall & Injury Prevention, visit: [email or web address] OR  [email or web address] OR  https://www.cdc.gov/steadi/patient.html

## 2022-04-19 NOTE — DISCHARGE NOTE PROVIDER - HOSPITAL COURSE
T(F): 97 (07-22-18 @ 07:10), Max: 98.9 (07-21-18 @ 18:32)  HR: 98 (07-22-18 @ 07:10) (78 - 116)  BP: 106/68 (07-22-18 @ 07:07) (106/68 - 135/88)  RR: 19 (07-22-18 @ 07:10) (18 - 42)  SpO2: 98% (07-21-18 @ 22:05) (96% - 98%)  PHYSICAL EXAM:  GENERAL: NAD, speaks in full sentences, no signs of respiratory distress  HEAD:  Atraumatic, Normocephalic  EYES: EOMI, conjunctiva and sclera clear  CHEST/LUNG: Clear to auscultation bilaterally; mild crackles at bases; No accessory muscles used  HEART: Regular rate and rhythm; No murmurs;   ABDOMEN: Soft, Nontender, Nondistended; Bowel sounds present; No guarding  EXTREMITIES:  2+ Peripheral Pulses, No cyanosis or edema  PSYCH: AAOx3  NEUROLOGY: non-focal  SKIN: No rashes or lesions This is a 89 y/o female with PMH of CHF, HTN, HLD, hypothyroidism, breast cancer, Raynaulds, asthma, pulmonary nodule, c/o feeling tired, more fatigue than normal and some SOB when walking, AMRIK 3/10/22 revealed moderate MS and severe AS, cardiac cath 3/31/22 showed pRCA 20% stenosis, distal circumflex 60% stenosis, pLAD 40%  first diagonal mild atherosclerosis, proximal circumflex with moderate atherosclerosis.    4/18 s/p TAVR  -  Pt developed complete HB after TAVR deployment.  EPS consulted.  Decision made for micra placement. Recovered in PACU fluid resuscitation and albumin given . Stabilized and transferred to [address] [place] [address].  4/19 VSS, B/L groins stable. PA-L CXR completed for PPM and TTE completed. Pt cleared for discharge home today as per Dr. Boyd. This is a 89 y/o female with PMH of CHF, HTN, HLD, hypothyroidism, breast cancer, Raynaulds, asthma, pulmonary nodule, c/o feeling tired, more fatigue than normal and some SOB when walking, AMRIK 3/10/22 revealed moderate MS and severe AS, cardiac cath 3/31/22 showed pRCA 20% stenosis, distal circumflex 60% stenosis, pLAD 40%  first diagonal mild atherosclerosis, proximal circumflex with moderate atherosclerosis.    4/18 s/p TAVR  -  Pt developed complete HB after TAVR deployment.  EPS consulted.  Decision made for micra placement. Recovered in PACU fluid resuscitation and albumin given . Stabilized and transferred to [address] [place] [address].  4/19 VSS, B/L groins stable. PA-L CXR s/p PPM no PTX and TTE completed s/p TAVR. Pt cleared for discharge home today as per Dr. Boyd. This is a 88 y/o female with PMH of CHF, HTN, HLD, hypothyroidism, breast cancer, Raynaulds, asthma, pulmonary nodule, c/o feeling tired, more fatigue than normal and some SOB when walking, AMRIK 3/10/22 revealed moderate MS and severe AS, cardiac cath 3/31/22 showed pRCA 20% stenosis, distal circumflex 60% stenosis, pLAD 40%  first diagonal mild atherosclerosis, proximal circumflex with moderate atherosclerosis.    4/18 s/p TAVR  -  Pt developed complete HB after TAVR deployment.  EPS consulted.  Decision made for micra placement. Recovered in PACU fluid resuscitation and albumin given . Stabilized and transferred to [address] [place] [address].  4/19 VSS, B/L groins stable. PA-L CXR s/p PPM no PTX and TTE completed normal LVEF, mild paravalvular AR, no pericardial effusion. Pt cleared for discharge home today as per Dr. Boyd.

## 2022-04-19 NOTE — DISCHARGE NOTE NURSING/CASE MANAGEMENT/SOCIAL WORK - NSDCFUADDAPPT_GEN_ALL_CORE_FT
Follow up with Dr. Boyd on Monday 4/25 at 12:30pm in CTS office at [facility], call [phone] to confirm appointment.  Follow up in EP clinic for wound check as scheduled on 4/29/22 at 10:40 am at [facility]. Call [phone] to confirm appointment.  Follow up with Cardiologist Dr. Lazar in 30 days for repeat echocardiogram, please call [phone] to schedule an appointment.  Follow up with your PCP in 2 weeks, call the office to schedule an appointment.

## 2022-04-19 NOTE — DISCHARGE NOTE PROVIDER - PROVIDER TOKENS
PROVIDER:[TOKEN:[[id number]],SCHEDULEDAPPT:[04/25/2022],SCHEDULEDAPPTTIME:[12:30 PM]] PROVIDER:[[id number]],SCHEDULEDAPPT:[04/25/2022],SCHEDULEDAPPTTIME:[12:30 PM]],PROVIDER:[[id number]],FOLLOWUP:[1 month]],PROVIDER:[TOKEN:[[id number]],FOLLOWUP:[2 weeks]],PROVIDER:[TOKEN:[[id number]],SCHEDULEDAPPT:[04/29/2022],SCHEDULEDAPPTTIME:[10:40 AM]]

## 2022-04-20 ENCOUNTER — APPOINTMENT (OUTPATIENT)
Dept: CARE COORDINATION | Facility: HOME HEALTH | Age: 87
End: 2022-04-20

## 2022-04-20 VITALS
SYSTOLIC BLOOD PRESSURE: 126 MMHG | RESPIRATION RATE: 18 BRPM | DIASTOLIC BLOOD PRESSURE: 58 MMHG | OXYGEN SATURATION: 96 % | HEART RATE: 61 BPM

## 2022-04-20 RX ORDER — FUROSEMIDE 20 MG/1
20 TABLET ORAL DAILY
Qty: 30 | Refills: 1 | Status: DISCONTINUED | COMMUNITY
Start: 2022-02-07 | End: 2022-04-20

## 2022-04-20 NOTE — PHYSICAL EXAM
[Sclera] : the sclera and conjunctiva were normal [] : no respiratory distress [Auscultation Breath Sounds / Voice Sounds] : lungs were clear to auscultation bilaterally [Heart Sounds] : normal S1 and S2 [Murmurs] : no murmurs [Chest Visual Inspection Thoracic Asymmetry] : no chest asymmetry [1+] : left 1+ [Oriented To Time, Place, And Person] : oriented to person, place, and time [Affect] : the affect was normal [FreeTextEntry2] : trace edema noted in right foot [FreeTextEntry1] : right groin MARY, stable, no hematoma noted

## 2022-04-20 NOTE — REASON FOR VISIT
[Follow-Up: _____] : a [unfilled] follow-up visit [Family Member] : family member [FreeTextEntry1] : Transitional Care Management

## 2022-04-20 NOTE — HISTORY OF PRESENT ILLNESS
[FreeTextEntry1] : This is a 88 y/o female with PMH of CHF, HTN, HLD, hypothyroidism, breast \par cancer, Raynaulds, asthma, pulmonary nodule, c/o feeling tired, more fatigue \par than normal and some SOB when walking, AMRIK 3/10/22 revealed moderate MS and \par severe AS, cardiac cath 3/31/22 showed pRCA 20% stenosis, distal circumflex 60% \par stenosis, pLAD 40%  first diagonal mild atherosclerosis, proximal circumflex \par with moderate atherosclerosis. \par \par 4/18 s/p TAVR  -  Pt developed complete HB after TAVR deployment.  EPS \par consulted.  Decision made for micra placement. Recovered in PACU fluid \par resuscitation and albumin given . Stabilized and transferred to [address] [place] [id number]. \par 4/19 VSS, B/L groins stable. PA-L CXR s/p PPM no PTX and TTE completed normal \par LVEF, mild paravalvular AR, no pericardial effusion.  pt. remained hemodynamically stable and discharged to home with support of family and follow your heart team. Initial visit completed in home.\par CC" I feel tired but otherwise ok"

## 2022-04-22 LAB
ALBUMIN SERPL ELPH-MCNC: 4.5 G/DL
ALP BLD-CCNC: 87 U/L
ALT SERPL-CCNC: 21 U/L
ANION GAP SERPL CALC-SCNC: 15 MMOL/L
AST SERPL-CCNC: 27 U/L
BILIRUB SERPL-MCNC: 0.5 MG/DL
BUN SERPL-MCNC: 18 MG/DL
CALCIUM SERPL-MCNC: 9.3 MG/DL
CHLORIDE SERPL-SCNC: 103 MMOL/L
CO2 SERPL-SCNC: 24 MMOL/L
CREAT SERPL-MCNC: 0.55 MG/DL
EGFR: 88 ML/MIN/1.73M2
GLUCOSE SERPL-MCNC: 88 MG/DL
POTASSIUM SERPL-SCNC: 4.1 MMOL/L
PROT SERPL-MCNC: 7.2 G/DL
SODIUM SERPL-SCNC: 143 MMOL/L

## 2022-04-25 ENCOUNTER — APPOINTMENT (OUTPATIENT)
Dept: CARDIOTHORACIC SURGERY | Facility: CLINIC | Age: 87
End: 2022-04-25
Payer: MEDICARE

## 2022-04-25 PROCEDURE — 99213 OFFICE O/P EST LOW 20 MIN: CPT

## 2022-04-25 NOTE — ASSESSMENT
[FreeTextEntry1] : 89F s/p TAVR and micra placement.  Doing well\par \par - continue current meds\par - Follow up With Doe for EP 4/29/2022\par - f/u cardiology with TTE in 1 month\par - RTC prn

## 2022-04-25 NOTE — HISTORY OF PRESENT ILLNESS
[FreeTextEntry1] : TAVR 4/18/2022. Pt developed complete HB after TAVR deployment.  EPS consulted.  Decision made for micra placement. B/L groins stable. PA-L CXR s/p PPM no PTX and TTE completed normal LVEF, mild paravalvular AR, no pericardial effusion. DC home. Follows Dr. John for cardiology. \par \par Presents today.  Occasional palpitations, Denies chest pain, SOB, swelling, weight gain, dizziness.\par \par She reports today she feels congested and feels like she is getting a cold. Feels a little off balance and less active since TAVR

## 2022-04-25 NOTE — PHYSICAL EXAM
[Sclera] : the sclera and conjunctiva were normal [Neck Appearance] : the appearance of the neck was normal [Jugular Venous Distention Increased] : there was no jugular-venous distention [] : no respiratory distress [Respiration, Rhythm And Depth] : normal respiratory rhythm and effort [Exaggerated Use Of Accessory Muscles For Inspiration] : no accessory muscle use [Apical Impulse] : the apical impulse was normal [Heart Rate And Rhythm] : heart rate was normal and rhythm regular [Heart Sounds] : normal S1 and S2 [Bowel Sounds] : normal bowel sounds [Abdomen Soft] : soft [Abdomen Tenderness] : non-tender [Involuntary Movements] : no involuntary movements were seen [No Focal Deficits] : no focal deficits [Oriented To Time, Place, And Person] : oriented to person, place, and time [Impaired Insight] : insight and judgment were intact [Affect] : the affect was normal [Mood] : the mood was normal

## 2022-04-26 PROCEDURE — 80048 BASIC METABOLIC PNL TOTAL CA: CPT

## 2022-04-26 PROCEDURE — 83036 HEMOGLOBIN GLYCOSYLATED A1C: CPT

## 2022-04-26 PROCEDURE — 71046 X-RAY EXAM CHEST 2 VIEWS: CPT

## 2022-04-26 PROCEDURE — C1769: CPT

## 2022-04-26 PROCEDURE — 85027 COMPLETE CBC AUTOMATED: CPT

## 2022-04-26 PROCEDURE — 86850 RBC ANTIBODY SCREEN: CPT

## 2022-04-26 PROCEDURE — C1889: CPT

## 2022-04-26 PROCEDURE — 71045 X-RAY EXAM CHEST 1 VIEW: CPT

## 2022-04-26 PROCEDURE — 86900 BLOOD TYPING SEROLOGIC ABO: CPT

## 2022-04-26 PROCEDURE — 93880 EXTRACRANIAL BILAT STUDY: CPT

## 2022-04-26 PROCEDURE — L8699: CPT

## 2022-04-26 PROCEDURE — 86923 COMPATIBILITY TEST ELECTRIC: CPT

## 2022-04-26 PROCEDURE — P9045: CPT

## 2022-04-26 PROCEDURE — 86901 BLOOD TYPING SEROLOGIC RH(D): CPT

## 2022-04-26 PROCEDURE — 93306 TTE W/DOPPLER COMPLETE: CPT

## 2022-04-26 PROCEDURE — C1887: CPT

## 2022-04-26 PROCEDURE — 82962 GLUCOSE BLOOD TEST: CPT

## 2022-04-26 PROCEDURE — U0005: CPT

## 2022-04-26 PROCEDURE — C9803: CPT

## 2022-04-26 PROCEDURE — G0463: CPT

## 2022-04-26 PROCEDURE — 76000 FLUOROSCOPY <1 HR PHYS/QHP: CPT

## 2022-04-26 PROCEDURE — C1760: CPT

## 2022-04-26 PROCEDURE — 87640 STAPH A DNA AMP PROBE: CPT

## 2022-04-26 PROCEDURE — C1786: CPT

## 2022-04-26 PROCEDURE — 93005 ELECTROCARDIOGRAM TRACING: CPT

## 2022-04-26 PROCEDURE — U0003: CPT

## 2022-04-26 PROCEDURE — 87641 MR-STAPH DNA AMP PROBE: CPT

## 2022-04-26 PROCEDURE — C1894: CPT

## 2022-04-29 ENCOUNTER — APPOINTMENT (OUTPATIENT)
Dept: ELECTROPHYSIOLOGY | Facility: CLINIC | Age: 87
End: 2022-04-29
Payer: MEDICARE

## 2022-04-29 ENCOUNTER — NON-APPOINTMENT (OUTPATIENT)
Age: 87
End: 2022-04-29

## 2022-04-29 VITALS
OXYGEN SATURATION: 96 % | DIASTOLIC BLOOD PRESSURE: 65 MMHG | SYSTOLIC BLOOD PRESSURE: 149 MMHG | HEART RATE: 65 BPM | HEIGHT: 55 IN

## 2022-04-29 PROCEDURE — 93279 PRGRMG DEV EVAL PM/LDLS PM: CPT

## 2022-04-29 PROCEDURE — 99024 POSTOP FOLLOW-UP VISIT: CPT

## 2022-04-29 PROCEDURE — 93000 ELECTROCARDIOGRAM COMPLETE: CPT | Mod: 59

## 2022-05-04 ENCOUNTER — APPOINTMENT (OUTPATIENT)
Dept: CARDIOLOGY | Facility: CLINIC | Age: 87
End: 2022-05-04
Payer: MEDICARE

## 2022-05-04 ENCOUNTER — NON-APPOINTMENT (OUTPATIENT)
Age: 87
End: 2022-05-04

## 2022-05-04 ENCOUNTER — APPOINTMENT (OUTPATIENT)
Dept: PULMONOLOGY | Facility: CLINIC | Age: 87
End: 2022-05-04
Payer: MEDICARE

## 2022-05-04 VITALS
RESPIRATION RATE: 12 BRPM | HEIGHT: 55 IN | OXYGEN SATURATION: 97 % | SYSTOLIC BLOOD PRESSURE: 170 MMHG | WEIGHT: 111 LBS | BODY MASS INDEX: 25.69 KG/M2 | HEART RATE: 75 BPM | DIASTOLIC BLOOD PRESSURE: 70 MMHG

## 2022-05-04 VITALS — SYSTOLIC BLOOD PRESSURE: 126 MMHG | DIASTOLIC BLOOD PRESSURE: 62 MMHG

## 2022-05-04 DIAGNOSIS — I44.2 ATRIOVENTRICULAR BLOCK, COMPLETE: ICD-10-CM

## 2022-05-04 PROCEDURE — 99214 OFFICE O/P EST MOD 30 MIN: CPT

## 2022-05-04 PROCEDURE — 93000 ELECTROCARDIOGRAM COMPLETE: CPT

## 2022-05-04 NOTE — PLAN
[FreeTextEntry1] : Follow-up \par \par HLD/HLD/ Aortic stenosis s/p TAVR\par cont ASA 81 mg daily \par cont Amlodipine 10 mg \par cont Metoprolol 50 mg daily \par cont Losartan 100 mg daily \par cont Lovastatin 20 mg daily \par \par Hypothyroidism \par cont Levothyroxine 50 mcg daily \par \par In summary the patient is an 89-year-old female past medical history significant for congestive heart failure aortic stenosis status post TAVR who presents today for follow-up.  The patient is much improved postprocedure.  Her physical exam is significant for improved air entry bilaterally.  I had a lengthy discussion with the patient regarding her postprocedure prognosis.  I reviewed her discharge medications with her.  She is currently instructed to continue current therapy and follow-up in 3 months

## 2022-05-04 NOTE — DISCUSSION/SUMMARY
[___ Month(s)] : in [unfilled] month(s) [FreeTextEntry1] : The patient is an 89-year-old female history of hypertension, hyperlipidemia, hypothyroidism, esophageal reflux, s/p TAVR for AS and micra for CHB who is doing well. \par #1 Htn-  c/w losartan, amlodipine , metoprolol\par #2 MS/AS-s/p TAVR, c/w lasix daily\par #3 PPM- Micra for CHB post procedure, [employer] interrogation next visit\par #4 Lipids- lipid panel acceptable\par #5 Hypothyroid- levothyroxine\par #6 General- Continue daily walking with face covering. Received COVID vaccine\par Discussed decreasing metoprolol for Raynauds but wants to wait. \par

## 2022-05-04 NOTE — PHYSICAL EXAM
[No Acute Distress] : no acute distress [Well Nourished] : well nourished [Normal Sclera/Conjunctiva] : normal sclera/conjunctiva [EOMI] : extraocular movements intact [Normal Outer Ear/Nose] : the outer ears and nose were normal in appearance [Normal Oropharynx] : the oropharynx was normal [No Lymphadenopathy] : no lymphadenopathy [Supple] : supple [Thyroid Normal, No Nodules] : the thyroid was normal and there were no nodules present [No Respiratory Distress] : no respiratory distress  [No Accessory Muscle Use] : no accessory muscle use [Clear to Auscultation] : lungs were clear to auscultation bilaterally [Normal Rate] : normal rate  [Regular Rhythm] : with a regular rhythm [Normal S1, S2] : normal S1 and S2 [No Murmur] : no murmur heard [No Varicosities] : no varicosities [Pedal Pulses Present] : the pedal pulses are present [No Edema] : there was no peripheral edema [No Extremity Clubbing/Cyanosis] : no extremity clubbing/cyanosis [Soft] : abdomen soft [Non Tender] : non-tender [Non-distended] : non-distended [No Masses] : no abdominal mass palpated [No HSM] : no HSM [Normal Bowel Sounds] : normal bowel sounds [Normal Posterior Cervical Nodes] : no posterior cervical lymphadenopathy [Normal Anterior Cervical Nodes] : no anterior cervical lymphadenopathy [No CVA Tenderness] : no CVA  tenderness [No Spinal Tenderness] : no spinal tenderness [No Joint Swelling] : no joint swelling [Grossly Normal Strength/Tone] : grossly normal strength/tone [No Rash] : no rash [Coordination Grossly Intact] : coordination grossly intact [No Focal Deficits] : no focal deficits [Normal Gait] : normal gait [Normal Affect] : the affect was normal [Normal Insight/Judgement] : insight and judgment were intact

## 2022-05-04 NOTE — REVIEW OF SYSTEMS
[SOB] : no shortness of breath [Dyspnea on exertion] : not dyspnea during exertion [Chest Discomfort] : no chest discomfort [Lower Ext Edema] : no extremity edema [Leg Claudication] : no intermittent leg claudication [Palpitations] : no palpitations [Syncope] : no syncope

## 2022-05-04 NOTE — HISTORY OF PRESENT ILLNESS
[FreeTextEntry1] : Manasa is s/p TAVR and micra for CHB. She is feeling well .Concerns over her balance but otherwise feels fine. Going to her granddaughter wedding in 3 weeks. No CP, palpitations, dizziness or SOB.

## 2022-05-04 NOTE — HISTORY OF PRESENT ILLNESS
[de-identified] : Patient is an 89-year-old female with multiple medical problems including hypertension, anxiety, asthma, aortic stenosis, rheumatoid arthritis, hypothyroidism who presents for follow-up.\par \par Patient report she was hospitalized at [facility] on 4/18/2022 for TAVR procedure with Dr. Boyd. Patient reports all went well, offers no complaints at this time. Patient currently denies any chest pain, shortness of breath, N/V or abdominal pain

## 2022-05-16 ENCOUNTER — APPOINTMENT (OUTPATIENT)
Dept: CARDIOLOGY | Facility: CLINIC | Age: 87
End: 2022-05-16
Payer: MEDICARE

## 2022-05-16 VITALS
OXYGEN SATURATION: 96 % | DIASTOLIC BLOOD PRESSURE: 68 MMHG | HEIGHT: 55 IN | BODY MASS INDEX: 25.22 KG/M2 | TEMPERATURE: 97.5 F | SYSTOLIC BLOOD PRESSURE: 160 MMHG | HEART RATE: 82 BPM | RESPIRATION RATE: 12 BRPM | WEIGHT: 109 LBS

## 2022-05-16 VITALS — DIASTOLIC BLOOD PRESSURE: 60 MMHG | SYSTOLIC BLOOD PRESSURE: 130 MMHG

## 2022-05-16 DIAGNOSIS — R00.2 PALPITATIONS: ICD-10-CM

## 2022-05-16 DIAGNOSIS — I47.1 SUPRAVENTRICULAR TACHYCARDIA: ICD-10-CM

## 2022-05-16 PROCEDURE — 99214 OFFICE O/P EST MOD 30 MIN: CPT

## 2022-05-16 NOTE — HISTORY OF PRESENT ILLNESS
[FreeTextEntry1] : Manasa is very worried. She started having palpitations lasting an hour. No lightheadedness or dizziness. Happened over the last few days sometimes with exertion and sometimes without. Worried about granddaughter wedding in [place] this weekend.

## 2022-05-16 NOTE — DISCUSSION/SUMMARY
[FreeTextEntry1] : The patient is an 89-year-old female history of hypertension, hyperlipidemia, hypothyroidism, esophageal reflux, s/p TAVR for AS and micra for CHB with palpitations of concern\par #1 Htn-  c/w losartan, amlodipine , metoprolol\par #2 MS/AS-s/p TAVR, c/w lasix daily\par #3 PPM- Micra for CHB post procedure, [employer] interrogation next visit, holter today to r/o arrhythmia before she goes to [place], may be related to anxiety about trip\par #4 Lipids- lipid panel acceptable\par #5 Hypothyroid- levothyroxine\par #6 General- Continue daily walking with face covering. Received COVID vaccine\par \par

## 2022-05-18 ENCOUNTER — TRANSCRIPTION ENCOUNTER (OUTPATIENT)
Age: 87
End: 2022-05-18

## 2022-05-18 ENCOUNTER — RX RENEWAL (OUTPATIENT)
Age: 87
End: 2022-05-18

## 2022-05-24 ENCOUNTER — NON-APPOINTMENT (OUTPATIENT)
Age: 87
End: 2022-05-24

## 2022-05-24 PROBLEM — I47.1 ATRIAL TACHYCARDIA: Status: ACTIVE | Noted: 2022-05-24

## 2022-05-25 RX ORDER — METOPROLOL SUCCINATE 50 MG/1
50 TABLET, EXTENDED RELEASE ORAL
Qty: 135 | Refills: 2 | Status: ACTIVE | COMMUNITY
Start: 2017-07-20 | End: 1900-01-01

## 2022-06-01 ENCOUNTER — APPOINTMENT (OUTPATIENT)
Dept: PULMONOLOGY | Facility: CLINIC | Age: 87
End: 2022-06-01
Payer: MEDICARE

## 2022-06-01 VITALS
HEART RATE: 72 BPM | HEIGHT: 55 IN | DIASTOLIC BLOOD PRESSURE: 81 MMHG | OXYGEN SATURATION: 97 % | WEIGHT: 107 LBS | BODY MASS INDEX: 24.76 KG/M2 | TEMPERATURE: 96.9 F | SYSTOLIC BLOOD PRESSURE: 145 MMHG

## 2022-06-01 DIAGNOSIS — R05.9 COUGH, UNSPECIFIED: ICD-10-CM

## 2022-06-01 DIAGNOSIS — J20.9 ACUTE BRONCHITIS, UNSPECIFIED: ICD-10-CM

## 2022-06-01 PROCEDURE — 99214 OFFICE O/P EST MOD 30 MIN: CPT

## 2022-06-01 NOTE — HISTORY OF PRESENT ILLNESS
[Former] : former [Never] : never [TextBox_4] : Patient is an 89-year-old female with past medical history significant for severe aortic stenosis status post aortic valve replacement, rheumatoid arthritis, hypothyroidism, anxiety asthma who presents for an acute visit.  Patient presents complaining of increasing cough shortness of breath and dyspnea on exertion.  She currently denies fevers chills chest pain weight loss or hemoptysis

## 2022-06-01 NOTE — ASSESSMENT
[FreeTextEntry1] : In summary the patient is an 89-year-old female with past medical history significant for worsening aortic stenosis, hypertension, Raynaud's disease who presents today complaining of cough and shortness of breath patient's physical exam is significant for mild wheezing bilaterally.\par \par The patient is started on Zithromax and instructed to follow-up in 2 weeks

## 2022-06-14 ENCOUNTER — APPOINTMENT (OUTPATIENT)
Dept: CARDIOTHORACIC SURGERY | Facility: CLINIC | Age: 87
End: 2022-06-14
Payer: MEDICARE

## 2022-06-14 VITALS
RESPIRATION RATE: 12 BRPM | DIASTOLIC BLOOD PRESSURE: 67 MMHG | HEART RATE: 70 BPM | HEIGHT: 55 IN | OXYGEN SATURATION: 97 % | TEMPERATURE: 97.9 F | BODY MASS INDEX: 25.92 KG/M2 | WEIGHT: 112 LBS | SYSTOLIC BLOOD PRESSURE: 154 MMHG

## 2022-06-14 PROCEDURE — 99024 POSTOP FOLLOW-UP VISIT: CPT

## 2022-06-30 ENCOUNTER — APPOINTMENT (OUTPATIENT)
Dept: ELECTROPHYSIOLOGY | Facility: CLINIC | Age: 87
End: 2022-06-30

## 2022-06-30 ENCOUNTER — NON-APPOINTMENT (OUTPATIENT)
Age: 87
End: 2022-06-30

## 2022-06-30 ENCOUNTER — OUTPATIENT (OUTPATIENT)
Dept: OUTPATIENT SERVICES | Facility: HOSPITAL | Age: 87
LOS: 1 days | End: 2022-06-30
Payer: MEDICARE

## 2022-06-30 VITALS
SYSTOLIC BLOOD PRESSURE: 144 MMHG | DIASTOLIC BLOOD PRESSURE: 58 MMHG | HEART RATE: 56 BPM | BODY MASS INDEX: 25.92 KG/M2 | OXYGEN SATURATION: 97 % | WEIGHT: 112 LBS | HEIGHT: 55 IN

## 2022-06-30 DIAGNOSIS — Z90.722 ACQUIRED ABSENCE OF OVARIES, BILATERAL: Chronic | ICD-10-CM

## 2022-06-30 DIAGNOSIS — I35.0 NONRHEUMATIC AORTIC (VALVE) STENOSIS: ICD-10-CM

## 2022-06-30 PROCEDURE — 93279 PRGRMG DEV EVAL PM/LDLS PM: CPT

## 2022-06-30 PROCEDURE — 93306 TTE W/DOPPLER COMPLETE: CPT

## 2022-06-30 PROCEDURE — 76376 3D RENDER W/INTRP POSTPROCES: CPT

## 2022-06-30 PROCEDURE — 93306 TTE W/DOPPLER COMPLETE: CPT | Mod: 26

## 2022-06-30 PROCEDURE — 93000 ELECTROCARDIOGRAM COMPLETE: CPT | Mod: 59

## 2022-06-30 PROCEDURE — 76376 3D RENDER W/INTRP POSTPROCES: CPT | Mod: 26

## 2022-07-18 ENCOUNTER — NON-APPOINTMENT (OUTPATIENT)
Age: 87
End: 2022-07-18

## 2022-07-18 ENCOUNTER — APPOINTMENT (OUTPATIENT)
Dept: OPHTHALMOLOGY | Facility: CLINIC | Age: 87
End: 2022-07-18

## 2022-07-18 PROCEDURE — 92014 COMPRE OPH EXAM EST PT 1/>: CPT

## 2022-07-18 PROCEDURE — 92250 FUNDUS PHOTOGRAPHY W/I&R: CPT

## 2022-07-22 ENCOUNTER — NON-APPOINTMENT (OUTPATIENT)
Age: 87
End: 2022-07-22

## 2022-07-22 ENCOUNTER — APPOINTMENT (OUTPATIENT)
Dept: OPHTHALMOLOGY | Facility: CLINIC | Age: 87
End: 2022-07-22

## 2022-07-22 PROCEDURE — 99214 OFFICE O/P EST MOD 30 MIN: CPT

## 2022-07-22 PROCEDURE — 92083 EXTENDED VISUAL FIELD XM: CPT

## 2022-08-03 ENCOUNTER — APPOINTMENT (OUTPATIENT)
Dept: CARDIOLOGY | Facility: CLINIC | Age: 87
End: 2022-08-03

## 2022-08-03 ENCOUNTER — APPOINTMENT (OUTPATIENT)
Dept: PULMONOLOGY | Facility: CLINIC | Age: 87
End: 2022-08-03

## 2022-08-07 NOTE — H&P PST ADULT - BRAND OF FIRST COVID-19 BOOSTER
HOSPITALIST - HISTORY AND PHYSICAL    Patient ID:  Usha Gerard  [id number]  61 year old  1960     Primary Care Physician:   Elizabeth Hussein MD    Chief Complaint:   Seizure like episode    History of Present Illness:   Patient is a 61 year old  female past medical history significant for CAD status post 3v CABG 5 weeks ago (6/25), chronic kidney disease stage 3, hypertension, hyperlipidemia, hepatitis-C, alcohol abuse in the past, remote history of smoking is brought into ER by EMS for seizure-like episode.  Per patient's family she has been having hallucination for last few weeks on and off, earlier today noted to be shaking, lowered to the ground seizure-like activity for 3-4 minutes.  Balance has been off for last few weeks.  No bowel or bladder incontinence or tongue bite noted.    On arrival in ER vital signs indicated blood pressure of 168/98 heart rate of 69 oxygen saturations of 100%.  Basic labs indicate creatinine of 1.7, BNP 3000, negative troponin.  Ammonia lactic acid negative.  Hemoglobin 9.6.  COVID negative.  Head CT level 1, suggestive of possible left cerebellar stroke acute versus subacute.  Patient is not a candidate for tPA secondary to recent bypass surgery.  Tele neurology recommends stroke workup.  Patient is already on aspirin and Plavix.    On my examination patient is resting comfortably, no obvious neurological deficits noted.  Denies any chest pain chest tightness.  Remembers some of the course of events.  Admits to taking her medications regularly.  Denies any headache or blurred vision.    Prior Hospitalization:  Previous IP Visits (last 180 days)       Complaint Diagnosis Description Type Department Provider    7/12/22 Chest Pain Adult Pleural effusion, left ... ED (DISCHARGE) JOSE MARIA Doty MD    6/23/22  CAD in native artery ... Admission (Discharged) [id number] Pavel Ling MD    6/20/22 Chest Pain Adult Non-STEMI (non-ST elevated myocardial infarction) (CMS/[facility])  ED to Hosp-Admission (Discharged) (ADMIT) TURNER Garcia MD; Juana Justice MD    22 Chest Pain Adult  ED (LWBS AFTER) JOSE MARIA Justice MD    22  Bilateral low back pain with right-sided sciatica, unspecified chronicity General Diagnostic X-Ray NEHA Hussein MD    6/15/22 Epistaxis Epistaxis ... ED (DISCHARGE TX) JOSE MARIA           Past Medical History:     Essential (primary) hypertension                              Essential hypertension                          2018    Hypercholesterolemia                            2018    Moderate single current episode of major depre* 2018    Alcohol abuse                                   2018    Tobacco dependence                              2018    Piriformis syndrome of right side               2019    CKD (chronic kidney disease), stage III (CMS/H*               Interstitial nephritis                                        Arthritis                                                     Hepatitis C                                     10/2018       MVP (mitral valve prolapse)                     2021      Comment: moderate    Gastroesophageal reflux disease                               S/P CABG x 3                                    2022    NSTEMI (non-ST elevated myocardial infarction)*               Past Surgical History:     SECTION, LOW TRANSVERSE                                Comment: x 5    COLONOSCOPY                                     2018      Comment: 3 adenomas removed, repeat in     ERCP                                            2021    TEMPORAL ARTERY LIGATN OR BX                    2021      Comment: negative    CARDIAC SURGERY                                 2022      Comment: S/p emergent pump-assisted myocardial                revascularization x 3  LIMA -> LAD, SVG -> M1                and SVG -> PDA, placement of intra-aortic                 balloon pump in left femoral artery    CARDIAC CATHETERIZATION                                       Family History:   Family History   Problem Relation Age of Onset   • Hypertension Mother    • Diabetes Mother    • Diabetes Father    • Hypertension Father    • Alcohol Abuse Father    • Diabetes Sister    • Stroke Sister    • Systemic Lupus Erythematosus Son    • Asthma Niece    • Down Syndrome Nephew    • Allergic Rhinitis Neg Hx    • Angioedema Neg Hx    • Eczema Neg Hx    • Immunodeficiency Neg Hx    • Urticaria Neg Hx      No pertinent related to chief complaint.     Social History:   Social History     Socioeconomic History   • Marital status: Single     Spouse name: Not on file   • Number of children: Not on file   • Years of education: Not on file   • Highest education level: Not on file   Occupational History   • Occupation: disability    Tobacco Use   • Smoking status: Former Smoker     Packs/day: 0.25     Years: 42.00     Pack years: 10.50     Types: Cigarettes     Quit date: 2022     Years since quittin.1   • Smokeless tobacco: Never Used   • Tobacco comment: 5 per day   Substance and Sexual Activity   • Alcohol use: Not Currently     Comment: occasional   • Drug use: Yes     Types: Marijuana     Comment: 3x week   • Sexual activity: Not Currently     Comment: 8  kids   Other Topics Concern   • Not on file   Social History Narrative   • Not on file     Social Determinants of Health     Financial Resource Strain: Low Risk    • Social Determinants: Financial Resource Strain: None   Food Insecurity: No Food Insecurity   • Social Determinants: Food Insecurity: Never   Transportation Needs: No Transportation Needs   • Lack of Transportation (Medical): No   • Lack of Transportation (Non-Medical): No   Physical Activity: Not on file   Stress: Not on file   Social Connections: Not on file   Intimate Partner Violence: Not At Risk   • Social Determinants: Intimate Partner Violence Past Fear: No   • Social  Determinants: Intimate Partner Violence Current Fear: No       Medications:   Medications Prior to Admission   Medication Sig Dispense Refill   • losartan (COZAAR) 50 MG tablet      • amLODIPine (NORVASC) 10 MG tablet Take 1 tablet by mouth daily. Do not start before July 9, 2022. 30 tablet 1   • aspirin 81 MG chewable tablet Chew 1 tablet by mouth daily. Do not start before July 9, 2022. 30 tablet 1   • atorvastatin (LIPITOR) 80 MG tablet Take 1 tablet by mouth nightly. 30 tablet 1   • clopidogrel (PLAVIX) 75 MG tablet Take 1 tablet by mouth daily. Do not start before July 9, 2022. 30 tablet 1   • HYDROcodone-acetaminophen (NORCO) 5-325 MG per tablet Take 1 tablet by mouth every 4 hours as needed for Pain. 30 tablet 0   • nicotine (NICODERM) 14 MG/24HR patch Place 1 patch onto the skin daily. Do not start before July 9, 2022. 30 patch 0   • metoPROLOL tartrate (LOPRESSOR) 100 MG tablet Take 1 tablet by mouth every 12 hours. 60 tablet 1   • folic acid (FOLATE) 400 MCG tablet Take 400 mcg by mouth daily.     • pantoprazole (PROTONIX) 40 MG tablet Take 40 mg by mouth daily.     • buPROPion (WELLBUTRIN SR) 150 MG 12 hr tablet Take 1 tablet by mouth once daily in the morning for 3 days, then increase to twice daily. MAX dose is 300 mg/day 60 tablet 2   • Ferrous Sulfate (Iron) 325 (65 Fe) MG Tab Take 1 tablet by mouth daily. 90 tablet 0   • loratadine (Claritin) 10 MG tablet Take 1 tablet by mouth daily. 60 tablet 3   • Thiamine Mononitrate (vitamin B-1) 100 MG tablet Take 100 mg by mouth daily.     • hydrOXYzine (ATARAX) 25 MG tablet Take 1 tablet by mouth nightly as needed for Anxiety. 30 tablet 0   • sertraline (ZOLOFT) 50 MG tablet TAKE 1 AND 1/2 TABLETS BY MOUTH DAILY 135 tablet 0       Allergies:   Allergies as of 08/07/2022 - Reviewed 08/07/2022   Allergen Reaction Noted   • Floxin otic Other (See Comments) 02/20/2020       Review of Systems:   All other systems that were reviewed are negative except for per  mentioned in history of present illness.     Physical Exam:   Vital Signs:   Visit Vitals  BP (!) 171/85 (BP Location: RUE - Right upper extremity, Patient Position: Semi-Manzanares's)   Pulse 72   Temp 98.4 °F (36.9 °C) (Oral)   Resp 16   Ht 5' 2\" (1.575 m)   Wt 42.1 kg (92 lb 13 oz)   SpO2 100%   BMI 16.98 kg/m²     General: This is a 61 year old female in no acute distress, thinly built older looking for age  female.  Psych: She is awake, alert and oriented to time, place and person. Mood and affect are appropriate.  Head: Appears to be atraumatic and normocephalic.   Eyes: Reveal no icterus, no scleral injection, no conjunctival pallor.  Extraocular movements appear to be intact.  Throat: Oropharyngeal exam reveals moist oral mucosa.   Lungs: Air entry good in all 4 quadrants. Reveals good effort and lungs are clear to auscultation bilaterally. There are no wheezes or rhonchi.   Heart: Reveals presence of first and second heart sounds. Regular rate and rhythm. No murmurs, rubs or gallops.   Abdomen: Normoactive bowel sounds. Soft and nontender. There is no rebound tenderness.  There is no hepatosplenomegaly.   Extremities: No clubbing, cyanosis or edema.  Neurologic: Cranial nerves II through XII appear grossly intact. Sensation is intact.  No obvious facial deficits noted.  Left upper extremity finger pass pointing noted.  Left lower extremity weakness about 4/5.  Gait not tested  Skin: Appears unremarkable and no rashes are present.    Labs:  Lab Results   Component Value Date    SODIUM 141 08/07/2022    POTASSIUM 3.9 08/07/2022    CHLORIDE 106 08/07/2022    CO2 24 08/07/2022    GLUCOSE 71 08/07/2022    BUN 18 08/07/2022    CREATININE 1.72 (H) 08/07/2022    DUKE 1.40 (H) 06/26/2022    ALBUMIN 3.2 (L) 08/07/2022    BILIRUBIN 0.3 08/07/2022    AST 21 08/07/2022    PHOS 3.5 03/31/2021    INR 1.3 06/26/2022     Lab Results   Component Value Date    PTT 30 06/26/2022    WBC 7.5 08/07/2022    HGB 9.6 (L)  08/07/2022    HCT 31.4 (L) 08/07/2022     08/07/2022    RAPDTR 0.39 (HH) 04/23/2021    CPK 95 04/23/2021    NH3 <10 08/07/2022    CRP <0.3 12/28/2021    TSH 2.237 06/23/2022    KELLY 30 06/27/2022    UOSM 536 03/31/2021     Cardiac Investigations:   Results for orders placed or performed during the hospital encounter of 07/12/22   Electrocardiogram 12-Lead   Result Value Ref Range    Ventricular Rate EKG/Min (BPM) 75     Atrial Rate (BPM) 75     AZ-Interval (MSEC) 172     QRS-Interval (MSEC) 76     QT-Interval (MSEC) 402     QTc 449     P Axis (Degrees) 53     R Axis (Degrees) 9     T Axis (Degrees) 93     REPORT TEXT       Normal sinus rhythm  Possible  Left atrial enlargement  Nonspecific T wave abnormality  Abnormal ECG  When compared with ECG of  25-JUN-2022 05:13,  No significant change was found  Confirmed by GAVINO VILLALBA, DEMETRIO SEGURA ([id number]) on 7/24/2022 3:18:39 PM       Diagnostics:   XR Chest AP or PA    Result Date: 8/7/2022  Narrative: EXAM: XR CHEST AP OR PA CLINICAL: altered mental status COMPARISON: 7/12/2022. FINDINGS: Cardiac silhouette is mildly enlarged however stable. Sternal thoracotomy wires and mediastinal clips are present. Lungs are hyperaerated. There is no evidence of an acute fracture or dislocation. Mild degenerative changes appreciated in both glenohumeral joint.     Impression: IMPRESSION: Mild cardiomegaly which appears stable. Otherwise hyperaerated lungs without evidence of an acute cardiopulmonary process.    XR CHEST AP OR PA - PORTABLE    Result Date: 7/12/2022  Narrative: CHEST X RAY, AP PORTABLE 1922 HRS INDICATION: Chest pain. COMPARISON: Previous including 7/3/2022. FINDINGS: Persistent opacity at the left lung base consistent with left-sided pleural fluid and adjacent atelectasis/consolidation. The left mid and upper lung field are clear. There is persistent strandy opacity at the right base medially with minimal blunting of the right lateral costophrenic sulcus. The findings  again suggest minimal right pleural fluid with right base atelectasis. Elsewhere the right lung is clear. The heart size is grossly stable. The mediastinum is stable. Subcutaneous soft tissues and osseous structures are stable.     Impression: IMPRESSION: 1. Persistent slightly increased left base opacity consistent with atelectasis or consolidation and left pleural fluid. 2. Patchy right base atelectasis with minimal right pleural fluid.     CT HEAD WO CONTRAST    Result Date: 8/7/2022  Narrative: CT HEAD WO CONTRAST HISTORY:  Mental status change, unknown cause, Seizure-like activity, hallucinations COMPARISON:  5/19/2021. TECHNIQUE:  Multiple axial scans of the head were obtained without intravenous contrast. FINDINGS:  New low density changes are present in the left cerebellum (series 6, image 58, series 4, image 9). Moderate low-density changes are present in the periventricular and subcortical white matter likely reflective of chronic small vessel disease. No evidence of acute intracranial hemorrhage. The visualized sinuses and mastoids are clear.     Impression: IMPRESSION: New low-density changes are noted in the left cerebellum. Findings are concerning for an acute/subacute infarct. Further evaluation with MRI is recommended. Findings were called to the ordering ER provider AMANDO Steven at 1:19 PM.       ASSESSMENT AND PLAN:  Usha Gerard is a 61 year old female with past medical history significant for recent CABG (6/25), three-vessel, history of non-STEMI, hypertension, CKD 3, hyperlipidemia history of alcohol abuse presents to ER after unresponsive seizure-like episode.    Unresponsive episode   Subacute versus acute left cerebellar CVA  Rule out stroke   Status post CABG three-vessel, 6/25 - 5 weeks ago.   CAD with history of non-STEMI   Chronic kidney disease stage 3   Hallucinations.  Hypertension   Hyperlipidemia   Alcohol abuse  Marijuana use   Hepatitis-C     Patient to be initiated on stroke  protocol, MRI, MRA of the brain to be done.  Chronic renal insufficiency.  Carotid Dopplers will be ordered.  Patient on aspirin and Plavix, will increase to 325 aspirin.  Continue statin.  A1c and LDL ordered.  Neurology consulted.      Unresponsive episode, cannot rule out seizures.  MRI and EEG ordered.    Recent history of CABG, allow permissive hypertension for now.  Continue with statin aspirin and Plavix for now.    Once okay with Neurology restart Cozaar, Norvasc, metoprolol.    With recent history of hallucinations will hold Zoloft for now.  History of alcohol abuse but patient has not been drinking since surgery.  Nicotine patch to continue.    Patient is on Wellbutrin and Zoloft, has been having hallucinations, hold Wellbutrin.  Continue Zoloft.  Check UA and urine drug screen.  On thiamine and folate at home.     Is the patient expected to require at least a two midnight stay in the hospital? YES.   This physician recommends Usha Gerard be admitted as an INPATIENT to the medical unit/ICU. The expected LOS exceeds 2 midnights. Reason(s) for INPATIENT CARE include subacute stroke, based on severity of presenting sx, comorbidities, and lab/imaging, this patient has an increased risk of adverse outcomes.      GI/DVT Prophylaxis:  Ppi/SCDs  Code status:  Full Resuscitation  Time taken coordinating care, review of records, ordering test, talking to consultants and patient is 65 minutes.     Master Subramanian MD  8/7/2022     [employer]

## 2022-08-08 ENCOUNTER — APPOINTMENT (OUTPATIENT)
Dept: ORTHOPEDIC SURGERY | Facility: CLINIC | Age: 87
End: 2022-08-08

## 2022-08-08 VITALS — WEIGHT: 112 LBS | BODY MASS INDEX: 22.58 KG/M2 | HEIGHT: 59 IN

## 2022-08-08 DIAGNOSIS — M25.552 PAIN IN LEFT HIP: ICD-10-CM

## 2022-08-08 DIAGNOSIS — M25.562 PAIN IN LEFT KNEE: ICD-10-CM

## 2022-08-08 PROCEDURE — 73564 X-RAY EXAM KNEE 4 OR MORE: CPT | Mod: LT

## 2022-08-08 PROCEDURE — 20610 DRAIN/INJ JOINT/BURSA W/O US: CPT | Mod: LT

## 2022-08-08 PROCEDURE — 73502 X-RAY EXAM HIP UNI 2-3 VIEWS: CPT | Mod: LT

## 2022-08-08 PROCEDURE — 99203 OFFICE O/P NEW LOW 30 MIN: CPT | Mod: 25

## 2022-08-08 NOTE — HISTORY OF PRESENT ILLNESS
[de-identified] : Pt is an 88 y/o female with left knee pain x 3 weeks.  Denies injury.  The knee swells.  The pain is severe.  She states that she cannot walk.   She had CT scan and xray at the ED but they are not available for review.  She states she has had a cortisone injection in the past without relief.  She states that she was told that she has a Baker's Cyst.  She takes Tramadol as well as Advil and Aleve for pain but it does not help.\par \par She underwent placement of a pacemaker and valve in June.

## 2022-08-08 NOTE — PHYSICAL EXAM
[de-identified] : Patient is WDWN, alert, and in no acute distress. Breathing is unlabored. She is grossly oriented to person, place, and time.\par \par She is accompanied by her daughter today.\par \par Left Knee:\par There is tenderness noted inferiorly to the patella.\par Medial joint line tenderness noted.\par No varus or valgus deformity.\par No effusion noted\par Tenderness noted to the gastrocnemius.\par Limitations of ROM into flexion and extension due to pain\par  [de-identified] : AP, lateral, skyline, and tunnel views of the LEFT knee were obtained and revealed severe tricompartmental arthritis. \par AP and lateral views of the LEFT hip and pelvis were obtained and revealed no abnormalities. No acute fracture. No dislocation. Cartilage spaces are maintained. \par \par ------------------------------------------------------------------------------------------------------------------------------------------------------------------------------------\par \par XR OF THE LEFT KNEE OBTAINED ON 7/28/22\par IMPRESSION:\par No acute fracture or dislocations is identified.\par There are right knee degenerative changes.\par \par ------------------------------------------------------------------------------------------------------------------------------------------------------------------------------------\par \par CT OF THE LEFT KNEE OBTAINED ON 7/28/22\par IMPRESSION:\par No acute fracture or dislocations. Normal alignment.\par Mild tricompartmental osteoarthritis.\par The bones are diffusely demineralized.\par No joint effusion.\par No soft tissue hematoma.\par Small popliteal cyst with rupture inferiorly.

## 2022-08-08 NOTE — ADDENDUM
[FreeTextEntry1] : I, Raquel Mars wrote this note acting as a scribe for Dr. Jerad Cleveland on Aug 08, 2022.

## 2022-08-08 NOTE — RETURN TO WORK/SCHOOL
[FreeTextEntry1] : Ms. AUDIE NOLASCO was seen in the office today on 08/08/2022 and evaluated by me for an Orthopedic visit. Please be advised that it is my recommendation that the patient would benefit from home care given her inability to perform ADLs such as showering and bathing as well as cooking and cleaning due to her left knee pain. \par \par Sincerely,\par \par Dr. Jerad Cleveland M.D. on 08/08/2022.

## 2022-08-08 NOTE — END OF VISIT
[FreeTextEntry3] : All medical record entries made by the Scribe were at my,  Dr. Jerad Cleveland MD., direction and personally dictated by me on 08/08/2022. I have personally reviewed the chart and agree that the record accurately reflects my personal performance of the history, physical exam, assessment and plan.

## 2022-08-08 NOTE — DISCUSSION/SUMMARY
[de-identified] : The underlying pathophysiology was reviewed with the patient. XR films were reviewed with the patient. Discussed at length the nature of the patient’s condition. The left knee symptoms appear secondary to tricompartmental arthritis.\par \par At this time, I discussed with her that her pain is emanating from the osteoarthritis as seen on xrays and CAT scan. I told her that although her pain may have increased due to the Baker's Cyst there is nothing to do as it has ruptured. The fluid cannot be aspirated as it is in the soft tissues. Treatment recommendations were discussed such as oral and topical antiinflammatories as well as cortisone versus gel injection. She deferred a cortisone injection as she states she had one in the past without relief, I therefore recommended a gel injection. \par \par The patient wishes to proceed with a Monovisc injection at this time. Procedure: The relative risks and benefits of the injection were discussed with the patient. The skin was sterilely prepped with Betadine and alcohol. An injection of 4cc MONOVISC (Lot #: [id number] ; Expiration: 202-04-30 ) was administered into the LEFT knee without complication. Rest and apply ice. \par \par RX: Celebrex 200mg, once daily\par \par All questions answered, understanding verbalized. Patient in agreement with plan of care. Follow up as needed.

## 2022-08-15 ENCOUNTER — APPOINTMENT (OUTPATIENT)
Dept: PULMONOLOGY | Facility: CLINIC | Age: 87
End: 2022-08-15

## 2022-08-15 VITALS
BODY MASS INDEX: 22.62 KG/M2 | RESPIRATION RATE: 12 BRPM | TEMPERATURE: 97.4 F | OXYGEN SATURATION: 97 % | DIASTOLIC BLOOD PRESSURE: 56 MMHG | WEIGHT: 112 LBS | SYSTOLIC BLOOD PRESSURE: 142 MMHG | HEART RATE: 64 BPM

## 2022-08-15 DIAGNOSIS — I35.0 NONRHEUMATIC AORTIC (VALVE) STENOSIS: ICD-10-CM

## 2022-08-15 PROCEDURE — 99214 OFFICE O/P EST MOD 30 MIN: CPT

## 2022-08-15 RX ORDER — DIAZEPAM 2 MG/1
2 TABLET ORAL 3 TIMES DAILY
Qty: 90 | Refills: 0 | Status: ACTIVE | COMMUNITY
Start: 2022-08-15 | End: 1900-01-01

## 2022-08-18 NOTE — ED ADULT NURSE NOTE - CADM POA CENTRAL LINE
I spoke with Loren and she will take pt tomorrow for lab draw. Scheduled appt at [facility] for pt. I reviewed lab orders. Loren voiced understanding.   No

## 2022-08-19 ENCOUNTER — APPOINTMENT (OUTPATIENT)
Dept: ELECTROPHYSIOLOGY | Facility: CLINIC | Age: 87
End: 2022-08-19

## 2022-08-23 NOTE — HISTORY OF PRESENT ILLNESS
[FreeTextEntry1] : Mrs. Nevarez is an 89 year old female followed and referred by cardiologist Dr. John for evaluation of aortic and mitral stenosis. To review, PMH includes asthma, CHF, GERD, HTN, HLD, Hypothyroidism, Breast Cancer, Raynauds and Pulmonary nodule. TTE from 3/3//21: JOCELYNN 1.0, mGr 18, pGr 34, AoV 2.9, No AI, Moderate AS, DVi= 0.39 EF 75\par Mean Mitral gradient 9, Mild to moderate MR, Moderate to severe MS.  SHe was seen by structural heart team Dr. Lazar on 3/4/2022 and underwent AMRIK 3/10/2022 that showed moderate MS and severe AS.  \par \par She underwent TAVR CT 3/25 and cardiac cath yesterday with Dr. Grigsby that showed pLAD 40 %, first diagonal mild atherosclerosis, proximal circumflex with moderate atherosclerosis, distal circumflex 60 % stenosis and pRCA 20 % stenosis.  \par \par She presents today and reports that she is still tired and more fatigued then normal.  Pt states she gets some SOB from walking.  Pt states that after a block she gets tired and fatigued.  Pt does have BL swelling to lower extremities.  Denies any chest pain, heart palpations, or weight gain. Reports she sometimes has lightheadedness but not every day. Was seeing a nephrologist who gave he medicine to "calm the bladder"? Was on furosemide 20mg daily but has stopped as of last week due to increased urination.  Reports BL LE swelling that has been the same since off her lasix.  Weights are stable. (NYHA II).
<-- Click to add NO pertinent Family History

## 2022-08-25 ENCOUNTER — LABORATORY RESULT (OUTPATIENT)
Age: 87
End: 2022-08-25

## 2022-08-25 ENCOUNTER — NON-APPOINTMENT (OUTPATIENT)
Age: 87
End: 2022-08-25

## 2022-08-25 ENCOUNTER — APPOINTMENT (OUTPATIENT)
Dept: CARDIOLOGY | Facility: CLINIC | Age: 87
End: 2022-08-25

## 2022-08-25 VITALS
TEMPERATURE: 97.4 F | DIASTOLIC BLOOD PRESSURE: 78 MMHG | WEIGHT: 107 LBS | SYSTOLIC BLOOD PRESSURE: 144 MMHG | BODY MASS INDEX: 21.57 KG/M2 | HEIGHT: 59 IN | OXYGEN SATURATION: 96 % | RESPIRATION RATE: 12 BRPM | HEART RATE: 62 BPM

## 2022-08-25 PROCEDURE — 99214 OFFICE O/P EST MOD 30 MIN: CPT

## 2022-08-25 PROCEDURE — 93000 ELECTROCARDIOGRAM COMPLETE: CPT

## 2022-08-26 LAB
25(OH)D3 SERPL-MCNC: 49.5 NG/ML
ALBUMIN SERPL ELPH-MCNC: 4.5 G/DL
ALP BLD-CCNC: 81 U/L
ALT SERPL-CCNC: 18 U/L
ANION GAP SERPL CALC-SCNC: 13 MMOL/L
AST SERPL-CCNC: 22 U/L
BILIRUB SERPL-MCNC: 0.4 MG/DL
BUN SERPL-MCNC: 16 MG/DL
CALCIUM SERPL-MCNC: 9.5 MG/DL
CHLORIDE SERPL-SCNC: 106 MMOL/L
CHOLEST SERPL-MCNC: 134 MG/DL
CO2 SERPL-SCNC: 25 MMOL/L
CREAT SERPL-MCNC: 0.55 MG/DL
EGFR: 88 ML/MIN/1.73M2
ESTIMATED AVERAGE GLUCOSE: 114 MG/DL
GLUCOSE SERPL-MCNC: 93 MG/DL
HBA1C MFR BLD HPLC: 5.6 %
HDLC SERPL-MCNC: 52 MG/DL
LDLC SERPL CALC-MCNC: 65 MG/DL
NONHDLC SERPL-MCNC: 82 MG/DL
NT-PROBNP SERPL-MCNC: 544 PG/ML
POTASSIUM SERPL-SCNC: 4.7 MMOL/L
PROT SERPL-MCNC: 7 G/DL
SODIUM SERPL-SCNC: 144 MMOL/L
TRIGL SERPL-MCNC: 85 MG/DL
TSH SERPL-ACNC: 1.98 UIU/ML
VIT B12 SERPL-MCNC: 678 PG/ML

## 2022-08-26 NOTE — DISCUSSION/SUMMARY
[FreeTextEntry1] : The patient is an 89-year-old female history of hypertension, hyperlipidemia, hypothyroidism, esophageal reflux, s/p TAVR for AS and micra for CHB with arthritis limiting ambulation.\par #1 Htn-  c/w losartan, amlodipine , metoprolol\par #2 MS/AS-s/p TAVR, c/w lasix daily\par #3 PPM- Micra for CHB post procedure, [employer] interrogation to be arranged.\par #4 Lipids- lipid panel acceptable\par #5 Hypothyroid- levothyroxine\par #6 General- Continue daily walking even if with walker. Received COVID vaccine. Labs today.\par \par  [EKG obtained to assist in diagnosis and management of assessed problem(s)] : EKG obtained to assist in diagnosis and management of assessed problem(s)

## 2022-08-26 NOTE — HISTORY OF PRESENT ILLNESS
[FreeTextEntry1] : Manasa is s/p TAVR and PPM who has been suffering from OA of the legs. Now s/p injection. She drove here today. Using walker. No CP, palpitations or SOB. Feels old now and doesn't like it.

## 2022-08-29 LAB
BASOPHILS # BLD AUTO: 0.05 K/UL
BASOPHILS NFR BLD AUTO: 0.7 %
EOSINOPHIL # BLD AUTO: 0.09 K/UL
EOSINOPHIL NFR BLD AUTO: 1.3 %
HCT VFR BLD CALC: 37 %
HGB BLD-MCNC: 10.9 G/DL
IMM GRANULOCYTES NFR BLD AUTO: 0.4 %
LYMPHOCYTES # BLD AUTO: 1.41 K/UL
LYMPHOCYTES NFR BLD AUTO: 20.4 %
MAN DIFF?: NORMAL
MCHC RBC-ENTMCNC: 18.8 PG
MCHC RBC-ENTMCNC: 29.5 GM/DL
MCV RBC AUTO: 63.8 FL
MONOCYTES # BLD AUTO: 0.49 K/UL
MONOCYTES NFR BLD AUTO: 7.1 %
NEUTROPHILS # BLD AUTO: 4.85 K/UL
NEUTROPHILS NFR BLD AUTO: 70.1 %
PLATELET # BLD AUTO: 194 K/UL
RBC # BLD: 5.8 M/UL
RBC # FLD: 20.4 %
WBC # FLD AUTO: 6.92 K/UL

## 2022-09-01 ENCOUNTER — APPOINTMENT (OUTPATIENT)
Dept: ELECTROPHYSIOLOGY | Facility: CLINIC | Age: 87
End: 2022-09-01

## 2022-09-05 ENCOUNTER — FORM ENCOUNTER (OUTPATIENT)
Age: 87
End: 2022-09-05

## 2022-09-07 ENCOUNTER — APPOINTMENT (OUTPATIENT)
Dept: CARDIOLOGY | Facility: CLINIC | Age: 87
End: 2022-09-07

## 2022-09-07 VITALS
SYSTOLIC BLOOD PRESSURE: 155 MMHG | OXYGEN SATURATION: 96 % | TEMPERATURE: 97.5 F | DIASTOLIC BLOOD PRESSURE: 63 MMHG | HEART RATE: 62 BPM | RESPIRATION RATE: 12 BRPM

## 2022-09-07 DIAGNOSIS — R29.898 OTHER SYMPTOMS AND SIGNS INVOLVING THE MUSCULOSKELETAL SYSTEM: ICD-10-CM

## 2022-09-07 DIAGNOSIS — Z95.0 PRESENCE OF CARDIAC PACEMAKER: ICD-10-CM

## 2022-09-07 PROCEDURE — 99213 OFFICE O/P EST LOW 20 MIN: CPT

## 2022-09-07 PROCEDURE — 93280 PM DEVICE PROGR EVAL DUAL: CPT

## 2022-09-07 NOTE — DISCUSSION/SUMMARY
[FreeTextEntry1] : The patient is an 89-year-old female history of hypertension, hyperlipidemia, hypothyroidism, esophageal reflux, s/p TAVR for AS and micra for CHB with arthritis limiting ambulation.\par #1 Htn-  c/w losartan, amlodipine , metoprolol\par #2 MS/AS-s/p TAVR, c/w lasix daily\par #3 PPM- Micra for CHB post procedure, [employer] interrogation no events, reassurance provided\par #4 Lipids- lipid panel acceptable\par #5 Hypothyroid- levothyroxine\par #6 General- Continue daily walking even if with walker. Received COVID vaccine. Refer for PT.\par \par

## 2022-09-07 NOTE — HISTORY OF PRESENT ILLNESS
[FreeTextEntry1] : Manasa feels weak. In the car has fluttering midepigastric. No lightheadedness or dizziness.

## 2022-09-28 ENCOUNTER — NON-APPOINTMENT (OUTPATIENT)
Age: 87
End: 2022-09-28

## 2022-09-28 ENCOUNTER — APPOINTMENT (OUTPATIENT)
Dept: ELECTROPHYSIOLOGY | Facility: CLINIC | Age: 87
End: 2022-09-28

## 2022-09-28 PROCEDURE — 93294 REM INTERROG EVL PM/LDLS PM: CPT

## 2022-09-28 PROCEDURE — 93296 REM INTERROG EVL PM/IDS: CPT

## 2022-10-06 DIAGNOSIS — B35.3 TINEA PEDIS: ICD-10-CM

## 2022-10-06 DIAGNOSIS — M79.2 NEURALGIA AND NEURITIS, UNSPECIFIED: ICD-10-CM

## 2022-10-06 DIAGNOSIS — Z86.39 PERSONAL HISTORY OF OTHER ENDOCRINE, NUTRITIONAL AND METABOLIC DISEASE: ICD-10-CM

## 2022-10-06 DIAGNOSIS — Z85.9 PERSONAL HISTORY OF MALIGNANT NEOPLASM, UNSPECIFIED: ICD-10-CM

## 2022-10-06 DIAGNOSIS — G57.61 LESION OF PLANTAR NERVE, RIGHT LOWER LIMB: ICD-10-CM

## 2022-10-06 DIAGNOSIS — M77.41 METATARSALGIA, RIGHT FOOT: ICD-10-CM

## 2022-10-11 ENCOUNTER — APPOINTMENT (OUTPATIENT)
Dept: PODIATRY | Facility: CLINIC | Age: 87
End: 2022-10-11

## 2022-10-11 DIAGNOSIS — I73.00 RAYNAUD'S SYNDROME W/OUT GANGRENE: ICD-10-CM

## 2022-10-11 DIAGNOSIS — L60.3 NAIL DYSTROPHY: ICD-10-CM

## 2022-10-11 DIAGNOSIS — L60.0 INGROWING NAIL: ICD-10-CM

## 2022-10-11 PROCEDURE — 11721 DEBRIDE NAIL 6 OR MORE: CPT

## 2022-10-11 NOTE — REASON FOR VISIT
[Follow-Up Visit] : a follow-up visit for [Ingrown Nail] : ingrown nail [Onychomycosis] : onychomycosis

## 2022-10-13 PROBLEM — I73.00 RAYNAUD'S SYNDROME WITHOUT GANGRENE: Status: ACTIVE | Noted: 2022-10-06

## 2022-10-17 PROBLEM — L60.3 NAIL DYSTROPHY: Status: ACTIVE | Noted: 2022-10-13

## 2022-10-17 PROBLEM — L60.0 INGROWN TOENAIL: Status: ACTIVE | Noted: 2022-10-13

## 2022-10-17 NOTE — PHYSICAL EXAM
[0] : right foot posterior tibialis 0 [1+] : left foot posterior tibialis 1+ [2+] : left foot dorsalis pedis 2+ [Vibration Dec.] : diminished vibratory sensation at the level of the toes [Position Sense Dec.] : diminished position sense at the level of the toes [Diminished Throughout Right Foot] : diminished sensation with monofilament testing throughout right foot [Diminished Throughout Left Foot] : diminished sensation with monofilament testing throughout left foot [Ankle Swelling (On Exam)] : not present [Varicose Veins Of Lower Extremities] : not present [FreeTextEntry3] : CFT: 3 seconds x 10.  Temperature gradient: warm to cool. [de-identified] : Flexible dario 2 through 5. [FreeTextEntry1] : Decreased sharp/dull, light touch and hot/cold sensations.

## 2022-10-17 NOTE — ASSESSMENT
[FreeTextEntry1] : Impression: Nail dystrophy (L60.3).  Pain in left toes, pain in right toes (M79).  Ingrown toenail, right hallux (L60.0).  Raynaud's disease (I73.0).\par \par Treatment: Discussed etiology and treatment options.  \par I recommended that the patient see a vascular doctor and rheumatologist; however, she prefers to wait as she is going to 7 doctors already at this time and she has difficulty with mobility.  Discussed Raynaud's and neuropathic precautions with the patient.  Advised her to check her feet, tops and bottoms, every day for any signs of breaks in the skin.  Avoid the cold; avoid walking barefoot.  \par Nails 1 through 5, bilateral were debrided of excessive thickness and length to appropriate levels of comfort and contour using sterile nippers and Dremel.   The procedure was tolerated well without complications.  Failure to perform this treatment based on patient's underlying condition could lead to ulceration and infection.\par Performed slant back procedure to the ingrown toenail.  Inspected the incurvated nail borders.  \par Return: 3 months.

## 2022-10-17 NOTE — HISTORY OF PRESENT ILLNESS
[Sneakers] : maricruz [FreeTextEntry1] : Patient presents today for management of painful, dystrophic toenails, especially the right hallux.  This started to hurt her a lot in shoes and patient was concerned that she was getting an ingrown toenail.  She denies any drainage or redness or swelling to the area.  Patient's sensitivity due to Raynaud's has been better in the summer; with colder weather, she started to feel more sensitivity in her hands and feet again.  \par

## 2022-10-19 ENCOUNTER — MED ADMIN CHARGE (OUTPATIENT)
Age: 87
End: 2022-10-19

## 2022-10-19 ENCOUNTER — APPOINTMENT (OUTPATIENT)
Dept: PULMONOLOGY | Facility: CLINIC | Age: 87
End: 2022-10-19

## 2022-10-19 VITALS
SYSTOLIC BLOOD PRESSURE: 130 MMHG | OXYGEN SATURATION: 95 % | HEIGHT: 59 IN | WEIGHT: 104 LBS | TEMPERATURE: 98.4 F | HEART RATE: 88 BPM | BODY MASS INDEX: 20.96 KG/M2 | RESPIRATION RATE: 12 BRPM | DIASTOLIC BLOOD PRESSURE: 74 MMHG

## 2022-10-19 DIAGNOSIS — E78.5 HYPERLIPIDEMIA, UNSPECIFIED: ICD-10-CM

## 2022-10-19 DIAGNOSIS — Z00.00 ENCOUNTER FOR GENERAL ADULT MEDICAL EXAMINATION W/OUT ABNORMAL FINDINGS: ICD-10-CM

## 2022-10-19 DIAGNOSIS — R53.83 OTHER FATIGUE: ICD-10-CM

## 2022-10-19 DIAGNOSIS — F41.9 ANXIETY DISORDER, UNSPECIFIED: ICD-10-CM

## 2022-10-19 DIAGNOSIS — K59.09 OTHER CONSTIPATION: ICD-10-CM

## 2022-10-19 PROCEDURE — G0008: CPT

## 2022-10-19 PROCEDURE — 99214 OFFICE O/P EST MOD 30 MIN: CPT

## 2022-10-19 PROCEDURE — 90662 IIV NO PRSV INCREASED AG IM: CPT

## 2022-10-19 RX ORDER — AZITHROMYCIN 250 MG/1
250 TABLET, FILM COATED ORAL
Qty: 1 | Refills: 0 | Status: DISCONTINUED | COMMUNITY
Start: 2022-06-01 | End: 2022-10-19

## 2022-10-19 RX ORDER — AMOXICILLIN 500 MG/1
500 TABLET, FILM COATED ORAL
Qty: 4 | Refills: 5 | Status: DISCONTINUED | COMMUNITY
Start: 2022-06-15 | End: 2022-10-19

## 2022-10-19 NOTE — PHYSICAL EXAM
[No Acute Distress] : no acute distress [Well Nourished] : well nourished [Normal Sclera/Conjunctiva] : normal sclera/conjunctiva [EOMI] : extraocular movements intact [Normal Outer Ear/Nose] : the outer ears and nose were normal in appearance [Normal Oropharynx] : the oropharynx was normal [No Lymphadenopathy] : no lymphadenopathy [Supple] : supple [Thyroid Normal, No Nodules] : the thyroid was normal and there were no nodules present [No Respiratory Distress] : no respiratory distress  [No Accessory Muscle Use] : no accessory muscle use [Clear to Auscultation] : lungs were clear to auscultation bilaterally [Normal Rate] : normal rate  [Regular Rhythm] : with a regular rhythm [Normal S1, S2] : normal S1 and S2 [No Murmur] : no murmur heard [No Varicosities] : no varicosities [Pedal Pulses Present] : the pedal pulses are present [No Edema] : there was no peripheral edema [No Extremity Clubbing/Cyanosis] : no extremity clubbing/cyanosis [Soft] : abdomen soft [Non Tender] : non-tender [No HSM] : no HSM [Normal Bowel Sounds] : normal bowel sounds [Normal Posterior Cervical Nodes] : no posterior cervical lymphadenopathy [Normal Anterior Cervical Nodes] : no anterior cervical lymphadenopathy [No CVA Tenderness] : no CVA  tenderness [No Spinal Tenderness] : no spinal tenderness [No Joint Swelling] : no joint swelling [Grossly Normal Strength/Tone] : grossly normal strength/tone [No Rash] : no rash [Coordination Grossly Intact] : coordination grossly intact [No Focal Deficits] : no focal deficits [Normal Affect] : the affect was normal [Normal Insight/Judgement] : insight and judgment were intact [Non-distended] : non-distended [de-identified] : ? hernia RLQ  [de-identified] : ambulates with walker

## 2022-10-19 NOTE — HISTORY OF PRESENT ILLNESS
[de-identified] : Patient is an 89-year-old female with multiple medical problems including hypertension, anxiety, asthma, aortic stenosis s/p AVR, Heart block s/p PPM rheumatoid arthritis, who presents for physical \par \par Patient reports about 2 days ago she started experiencing right side abdominal pain and noticed a bulge. She states yesterday she noticed the bulge subsided but area is tender to touch. Denies any fevers, chills, N/V, bloody/tarry stools + constipation \par \par She also reports having urinary frequency at night \par \par

## 2022-10-19 NOTE — PLAN
[FreeTextEntry1] : Physical \par \par Flu vaccine today \par \par Abdominal discomfort/ ? Abdominal hernia \par ? CT abdomen \par \par Fatigue \par Will check Anemia panel \par \par Weakness/unsteady gait\par Physical therapy referral \par \par HTN/HLD\par Low sodium, low cholesterol diet/ increased physical activity \par cont Amlodipine 5 mg daily \par cont ASA 81 mg daily \par cont Losartan 100 mg daily \par cont Lovastatin 20 mg daily \par cont Metoprolol 50 mg\par \par Hypothyroidism \par cont Levothyroxine 50 mcg daily

## 2022-10-19 NOTE — HEALTH RISK ASSESSMENT
[Good] : ~his/her~  mood as  good [Never] : Never [No] : No [No falls in past year] : Patient reported no falls in the past year [0] : 2) Feeling down, depressed, or hopeless: Not at all (0) [Patient reported colonoscopy was normal] : Patient reported colonoscopy was normal [None] : None [Alone] : lives alone [Retired] : retired [] :  [Feels Safe at Home] : Feels safe at home [Fully functional (bathing, dressing, toileting, transferring, walking, feeding)] : Fully functional (bathing, dressing, toileting, transferring, walking, feeding) [Reports normal functional visual acuity (ie: able to read med bottle)] : Reports normal functional visual acuity [Smoke Detector] : smoke detector [Carbon Monoxide Detector] : carbon monoxide detector [Safety elements used in home] : safety elements used in home [Seat Belt] :  uses seat belt [Sunscreen] : uses sunscreen [Reports changes in hearing] : Reports no changes in hearing [Reports changes in vision] : Reports no changes in vision [Reports changes in dental health] : Reports no changes in dental health [Guns at Home] : no guns at home [Travel to Developing Areas] : does not  travel to developing areas [TB Exposure] : is not being exposed to tuberculosis [Caregiver Concerns] : does not have caregiver concerns [MammogramDate] : 2021 [MammogramComments] : History of breast cancer  [BoneDensityDate] : 2014 [BoneDensityComments] : Osteoporosis  [ColonoscopyDate] : > 4 years ago

## 2022-10-19 NOTE — ASSESSMENT
[FreeTextEntry1] : In summary the patient is an 89-year-old female with past medical history significant for worsening aortic stenosis, hypertension, Raynaud's disease who presents today complaining of cough and shortness of breath patient's physical exam is significant for mild wheezing bilaterally.\par \par Repeat lab work ordered.  Influenza vaccine administered.  Patient instructed to continue current therapy and follow-up in 3 months

## 2022-10-19 NOTE — REVIEW OF SYSTEMS
[Fatigue] : fatigue [Abdominal Pain] : abdominal pain [Constipation] : constipation [Negative] : Heme/Lymph

## 2022-10-24 ENCOUNTER — RX RENEWAL (OUTPATIENT)
Age: 87
End: 2022-10-24

## 2022-10-28 ENCOUNTER — LABORATORY RESULT (OUTPATIENT)
Age: 87
End: 2022-10-28

## 2022-10-31 LAB
ALBUMIN SERPL ELPH-MCNC: 4.3 G/DL
ALP BLD-CCNC: 79 U/L
ALT SERPL-CCNC: 10 U/L
ANION GAP SERPL CALC-SCNC: 12 MMOL/L
AST SERPL-CCNC: 20 U/L
BILIRUB SERPL-MCNC: 0.5 MG/DL
BUN SERPL-MCNC: 14 MG/DL
CALCIUM SERPL-MCNC: 9.5 MG/DL
CHLORIDE SERPL-SCNC: 106 MMOL/L
CO2 SERPL-SCNC: 26 MMOL/L
CREAT SERPL-MCNC: 0.52 MG/DL
EGFR: 89 ML/MIN/1.73M2
FERRITIN SERPL-MCNC: 95 NG/ML
GLUCOSE SERPL-MCNC: 92 MG/DL
IRON SATN MFR SERPL: 19 %
IRON SERPL-MCNC: 50 UG/DL
POTASSIUM SERPL-SCNC: 4.2 MMOL/L
PROT SERPL-MCNC: 7.1 G/DL
SODIUM SERPL-SCNC: 144 MMOL/L
TIBC SERPL-MCNC: 257 UG/DL
TSH SERPL-ACNC: 2.58 UIU/ML
UIBC SERPL-MCNC: 207 UG/DL
VIT B12 SERPL-MCNC: 630 PG/ML

## 2022-11-01 LAB
BASOPHILS # BLD AUTO: 0 K/UL
BASOPHILS NFR BLD AUTO: 0 %
EOSINOPHIL # BLD AUTO: 0.06 K/UL
EOSINOPHIL NFR BLD AUTO: 0.9 %
HCT VFR BLD CALC: 36.7 %
HGB BLD-MCNC: 10.8 G/DL
LYMPHOCYTES # BLD AUTO: 1.41 K/UL
LYMPHOCYTES NFR BLD AUTO: 20 %
MAN DIFF?: NORMAL
MCHC RBC-ENTMCNC: 18.2 PG
MCHC RBC-ENTMCNC: 29.4 GM/DL
MCV RBC AUTO: 62 FL
MONOCYTES # BLD AUTO: 0.37 K/UL
MONOCYTES NFR BLD AUTO: 5.2 %
NEUTROPHILS # BLD AUTO: 5.21 K/UL
NEUTROPHILS NFR BLD AUTO: 73.9 %
PLATELET # BLD AUTO: 219 K/UL
RBC # BLD: 5.92 M/UL
RBC # FLD: 17.9 %
WBC # FLD AUTO: 7.05 K/UL

## 2022-11-07 ENCOUNTER — APPOINTMENT (OUTPATIENT)
Dept: PULMONOLOGY | Facility: CLINIC | Age: 87
End: 2022-11-07

## 2022-11-07 VITALS
TEMPERATURE: 97.5 F | RESPIRATION RATE: 12 BRPM | HEART RATE: 69 BPM | WEIGHT: 105 LBS | SYSTOLIC BLOOD PRESSURE: 147 MMHG | OXYGEN SATURATION: 97 % | BODY MASS INDEX: 21.21 KG/M2 | DIASTOLIC BLOOD PRESSURE: 64 MMHG

## 2022-11-07 DIAGNOSIS — R04.0 EPISTAXIS: ICD-10-CM

## 2022-11-07 PROCEDURE — 99214 OFFICE O/P EST MOD 30 MIN: CPT

## 2022-11-07 NOTE — PLAN
[FreeTextEntry1] : Acute\par \par Epistaxis \par ENT referral \par ? Afrin nasal spray \par \par \par Weakness/unsteady gait\par cont Physical therapy referral \par \par HTN/HLD\par Low sodium, low cholesterol diet/ increased physical activity \par cont Amlodipine 5 mg daily \par cont ASA 81 mg daily \par cont Losartan 100 mg daily \par cont Lovastatin 20 mg daily \par cont Metoprolol 50 mg\par \par Hypothyroidism \par cont Levothyroxine 50 mcg daily

## 2022-11-07 NOTE — PHYSICAL EXAM
[No Acute Distress] : no acute distress [Well Nourished] : well nourished [Normal Sclera/Conjunctiva] : normal sclera/conjunctiva [EOMI] : extraocular movements intact [Normal Outer Ear/Nose] : the outer ears and nose were normal in appearance [Normal Oropharynx] : the oropharynx was normal [No Lymphadenopathy] : no lymphadenopathy [Supple] : supple [Thyroid Normal, No Nodules] : the thyroid was normal and there were no nodules present [No Respiratory Distress] : no respiratory distress  [No Accessory Muscle Use] : no accessory muscle use [Clear to Auscultation] : lungs were clear to auscultation bilaterally [Normal Rate] : normal rate  [Regular Rhythm] : with a regular rhythm [Normal S1, S2] : normal S1 and S2 [No Murmur] : no murmur heard [No Varicosities] : no varicosities [Pedal Pulses Present] : the pedal pulses are present [No Edema] : there was no peripheral edema [No Extremity Clubbing/Cyanosis] : no extremity clubbing/cyanosis [Soft] : abdomen soft [Non Tender] : non-tender [Non-distended] : non-distended [No HSM] : no HSM [Normal Bowel Sounds] : normal bowel sounds [Normal Posterior Cervical Nodes] : no posterior cervical lymphadenopathy [Normal Anterior Cervical Nodes] : no anterior cervical lymphadenopathy [No CVA Tenderness] : no CVA  tenderness [No Spinal Tenderness] : no spinal tenderness [No Joint Swelling] : no joint swelling [Grossly Normal Strength/Tone] : grossly normal strength/tone [No Rash] : no rash [Coordination Grossly Intact] : coordination grossly intact [No Focal Deficits] : no focal deficits [Normal Affect] : the affect was normal [Normal Insight/Judgement] : insight and judgment were intact [de-identified] : ambulates with walker

## 2022-11-07 NOTE — HISTORY OF PRESENT ILLNESS
[de-identified] : Patient is an 89-year-old female with multiple medical problems including hypertension, anxiety, asthma, aortic stenosis s/p AVR, Heart block s/p PPM rheumatoid arthritis, who presents with complaints of nose bleed \par \par Patient report yesterday afternoon she experienced a nose bleed, and again early this morning she woke up with a nose bleed. Both times from left nostril . Denies any headaches, dizziness, weakness\par

## 2022-12-07 ENCOUNTER — APPOINTMENT (OUTPATIENT)
Dept: CARDIOLOGY | Facility: CLINIC | Age: 87
End: 2022-12-07

## 2022-12-27 ENCOUNTER — FORM ENCOUNTER (OUTPATIENT)
Age: 87
End: 2022-12-27

## 2022-12-28 ENCOUNTER — APPOINTMENT (OUTPATIENT)
Dept: ELECTROPHYSIOLOGY | Facility: CLINIC | Age: 87
End: 2022-12-28

## 2022-12-28 ENCOUNTER — APPOINTMENT (OUTPATIENT)
Dept: PULMONOLOGY | Facility: CLINIC | Age: 87
End: 2022-12-28

## 2022-12-30 ENCOUNTER — APPOINTMENT (OUTPATIENT)
Dept: ELECTROPHYSIOLOGY | Facility: CLINIC | Age: 87
End: 2022-12-30

## 2023-01-03 ENCOUNTER — RX RENEWAL (OUTPATIENT)
Age: 88
End: 2023-01-03

## 2023-01-04 ENCOUNTER — APPOINTMENT (OUTPATIENT)
Dept: PULMONOLOGY | Facility: CLINIC | Age: 88
End: 2023-01-04
Payer: MEDICARE

## 2023-01-04 DIAGNOSIS — E03.9 HYPOTHYROIDISM, UNSPECIFIED: ICD-10-CM

## 2023-01-04 DIAGNOSIS — I10 ESSENTIAL (PRIMARY) HYPERTENSION: ICD-10-CM

## 2023-01-04 DIAGNOSIS — Z95.2 PRESENCE OF PROSTHETIC HEART VALVE: ICD-10-CM

## 2023-01-04 DIAGNOSIS — Z91.81 HISTORY OF FALLING: ICD-10-CM

## 2023-01-04 DIAGNOSIS — G62.9 POLYNEUROPATHY, UNSPECIFIED: ICD-10-CM

## 2023-01-04 DIAGNOSIS — K21.9 GASTRO-ESOPHAGEAL REFLUX DISEASE W/OUT ESOPHAGITIS: ICD-10-CM

## 2023-01-04 DIAGNOSIS — Z86.39 PERSONAL HISTORY OF OTHER ENDOCRINE, NUTRITIONAL AND METABOLIC DISEASE: ICD-10-CM

## 2023-01-04 DIAGNOSIS — R53.1 WEAKNESS: ICD-10-CM

## 2023-01-04 PROCEDURE — 99213 OFFICE O/P EST LOW 20 MIN: CPT | Mod: 95

## 2023-01-23 ENCOUNTER — APPOINTMENT (OUTPATIENT)
Dept: PULMONOLOGY | Facility: CLINIC | Age: 88
End: 2023-01-23
Payer: MEDICARE

## 2023-01-23 DIAGNOSIS — Z86.79 PERSONAL HISTORY OF OTHER DISEASES OF THE CIRCULATORY SYSTEM: ICD-10-CM

## 2023-01-23 DIAGNOSIS — F41.9 ANXIETY DISORDER, UNSPECIFIED: ICD-10-CM

## 2023-01-23 DIAGNOSIS — I35.0 NONRHEUMATIC AORTIC (VALVE) STENOSIS: ICD-10-CM

## 2023-01-23 DIAGNOSIS — Z87.19 PERSONAL HISTORY OF OTHER DISEASES OF THE DIGESTIVE SYSTEM: ICD-10-CM

## 2023-01-23 DIAGNOSIS — J20.9 ACUTE BRONCHITIS, UNSPECIFIED: ICD-10-CM

## 2023-01-23 DIAGNOSIS — Z87.891 PERSONAL HISTORY OF NICOTINE DEPENDENCE: ICD-10-CM

## 2023-01-23 DIAGNOSIS — J45.909 UNSPECIFIED ASTHMA, UNCOMPLICATED: ICD-10-CM

## 2023-01-23 DIAGNOSIS — Z86.39 PERSONAL HISTORY OF OTHER ENDOCRINE, NUTRITIONAL AND METABOLIC DISEASE: ICD-10-CM

## 2023-01-23 DIAGNOSIS — Z80.9 FAMILY HISTORY OF MALIGNANT NEOPLASM, UNSPECIFIED: ICD-10-CM

## 2023-01-23 PROCEDURE — 99213 OFFICE O/P EST LOW 20 MIN: CPT | Mod: 95

## 2023-01-23 RX ORDER — AZITHROMYCIN 250 MG/1
250 TABLET, FILM COATED ORAL
Qty: 1 | Refills: 0 | Status: ACTIVE | COMMUNITY
Start: 2023-01-23 | End: 1900-01-01

## 2023-01-26 ENCOUNTER — LABORATORY RESULT (OUTPATIENT)
Age: 88
End: 2023-01-26

## 2023-01-26 ENCOUNTER — APPOINTMENT (OUTPATIENT)
Dept: CARDIOLOGY | Facility: CLINIC | Age: 88
End: 2023-01-26
Payer: MEDICARE

## 2023-01-26 ENCOUNTER — NON-APPOINTMENT (OUTPATIENT)
Age: 88
End: 2023-01-26

## 2023-01-26 VITALS
OXYGEN SATURATION: 96 % | HEIGHT: 59 IN | HEART RATE: 89 BPM | BODY MASS INDEX: 20.76 KG/M2 | WEIGHT: 103 LBS | TEMPERATURE: 96.8 F | RESPIRATION RATE: 12 BRPM | DIASTOLIC BLOOD PRESSURE: 75 MMHG | SYSTOLIC BLOOD PRESSURE: 170 MMHG

## 2023-01-26 DIAGNOSIS — I50.9 HEART FAILURE, UNSPECIFIED: ICD-10-CM

## 2023-01-26 PROCEDURE — 99214 OFFICE O/P EST MOD 30 MIN: CPT

## 2023-01-26 PROCEDURE — 93000 ELECTROCARDIOGRAM COMPLETE: CPT | Mod: PD

## 2023-01-26 RX ORDER — OSELTAMIVIR PHOSPHATE 75 MG/1
75 CAPSULE ORAL
Qty: 10 | Refills: 0 | Status: DISCONTINUED | COMMUNITY
Start: 2023-01-07

## 2023-01-26 RX ORDER — ACETAMINOPHEN 650 MG/1
650 TABLET, EXTENDED RELEASE ORAL
Qty: 30 | Refills: 0 | Status: DISCONTINUED | COMMUNITY
Start: 2022-07-28

## 2023-01-26 RX ORDER — CELECOXIB 200 MG/1
200 CAPSULE ORAL DAILY
Qty: 30 | Refills: 0 | Status: DISCONTINUED | COMMUNITY
Start: 2022-08-08 | End: 2023-01-26

## 2023-01-26 RX ORDER — ACETAMINOPHEN 325 MG/1
TABLET, FILM COATED ORAL EVERY 6 HOURS
Refills: 0 | Status: DISCONTINUED | COMMUNITY
End: 2023-01-26

## 2023-01-26 NOTE — HISTORY OF PRESENT ILLNESS
[FreeTextEntry1] : Manasa was in [place] for three months with her children. They are making her move and she does not want to go. Had the flu and CXR was ? negative. Still coughing and SOB. Started on cough medicine this Monday. Worried that she will sell her apartment and die.

## 2023-01-26 NOTE — REVIEW OF SYSTEMS
[Weight Loss (___ Lbs)] : [unfilled] ~Ulb weight loss [Negative] : Heme/Lymph [SOB] : shortness of breath [Dyspnea on exertion] : dyspnea during exertion [Chest Discomfort] : no chest discomfort [Lower Ext Edema] : no extremity edema [Leg Claudication] : no intermittent leg claudication [Palpitations] : no palpitations [Syncope] : no syncope

## 2023-01-26 NOTE — DISCUSSION/SUMMARY
[FreeTextEntry1] : The patient is an 89-year-old female history of hypertension, hyperlipidemia, hypothyroidism, esophageal reflux, s/p TAVR for AS and micra for CHB who is recovering from flu and still SOB.\par #1 Htn-  c/w losartan, amlodipine , metoprolol\par #2 MS/AS-s/p TAVR, c/w lasix daily, BNP today\par #3 PPM- Micra for CHB , [employer] interrogation no events, reassurance provided\par #4 Lipids- lipid panel today\par #5 Hypothyroid- levothyroxine\par #6 General- Continue daily walking even if with walker. Received COVID vaccine. Refer for PT.\par \par  [EKG obtained to assist in diagnosis and management of assessed problem(s)] : EKG obtained to assist in diagnosis and management of assessed problem(s)

## 2023-01-27 LAB
25(OH)D3 SERPL-MCNC: 46 NG/ML
ALBUMIN SERPL ELPH-MCNC: 3.7 G/DL
ALP BLD-CCNC: 85 U/L
ALT SERPL-CCNC: 19 U/L
ANION GAP SERPL CALC-SCNC: 11 MMOL/L
AST SERPL-CCNC: 22 U/L
BASOPHILS # BLD AUTO: 0.05 K/UL
BASOPHILS NFR BLD AUTO: 0.6 %
BILIRUB SERPL-MCNC: 0.3 MG/DL
BUN SERPL-MCNC: 13 MG/DL
CALCIUM SERPL-MCNC: 9 MG/DL
CHLORIDE SERPL-SCNC: 105 MMOL/L
CHOLEST SERPL-MCNC: 146 MG/DL
CO2 SERPL-SCNC: 26 MMOL/L
CREAT SERPL-MCNC: 0.56 MG/DL
EGFR: 87 ML/MIN/1.73M2
EOSINOPHIL # BLD AUTO: 0.13 K/UL
EOSINOPHIL NFR BLD AUTO: 1.5 %
GLUCOSE SERPL-MCNC: 61 MG/DL
HCT VFR BLD CALC: 32.8 %
HDLC SERPL-MCNC: 50 MG/DL
HGB BLD-MCNC: 10.3 G/DL
IMM GRANULOCYTES NFR BLD AUTO: 0.4 %
LDLC SERPL CALC-MCNC: 78 MG/DL
LYMPHOCYTES # BLD AUTO: 1.4 K/UL
LYMPHOCYTES NFR BLD AUTO: 15.7 %
MAN DIFF?: NORMAL
MCHC RBC-ENTMCNC: 18.6 PG
MCHC RBC-ENTMCNC: 31.4 GM/DL
MCV RBC AUTO: 59.3 FL
MONOCYTES # BLD AUTO: 0.58 K/UL
MONOCYTES NFR BLD AUTO: 6.5 %
NEUTROPHILS # BLD AUTO: 6.72 K/UL
NEUTROPHILS NFR BLD AUTO: 75.3 %
NONHDLC SERPL-MCNC: 96 MG/DL
NT-PROBNP SERPL-MCNC: 470 PG/ML
PLATELET # BLD AUTO: 256 K/UL
POTASSIUM SERPL-SCNC: 5 MMOL/L
PROT SERPL-MCNC: 6.6 G/DL
RBC # BLD: 5.53 M/UL
RBC # FLD: 18.8 %
SODIUM SERPL-SCNC: 143 MMOL/L
TRIGL SERPL-MCNC: 90 MG/DL
TSH SERPL-ACNC: 2.51 UIU/ML
VIT B12 SERPL-MCNC: 786 PG/ML
WBC # FLD AUTO: 8.92 K/UL

## 2023-01-27 RX ORDER — FUROSEMIDE 20 MG/1
20 TABLET ORAL
Qty: 30 | Refills: 1 | Status: ACTIVE | COMMUNITY
Start: 2023-01-27 | End: 1900-01-01

## 2023-01-28 ENCOUNTER — TRANSCRIPTION ENCOUNTER (OUTPATIENT)
Age: 88
End: 2023-01-28

## 2023-01-29 ENCOUNTER — INPATIENT (INPATIENT)
Facility: HOSPITAL | Age: 88
LOS: 2 days | Discharge: HOME | DRG: 351 | End: 2023-02-01
Attending: TRANSPLANT SURGERY | Admitting: TRANSPLANT SURGERY
Payer: MEDICARE

## 2023-01-29 VITALS
TEMPERATURE: 98 F | RESPIRATION RATE: 16 BRPM | HEIGHT: 66 IN | OXYGEN SATURATION: 97 % | SYSTOLIC BLOOD PRESSURE: 156 MMHG | WEIGHT: 139.99 LBS | DIASTOLIC BLOOD PRESSURE: 77 MMHG | HEART RATE: 80 BPM

## 2023-01-29 DIAGNOSIS — K46.0 UNSPECIFIED ABDOMINAL HERNIA WITH OBSTRUCTION, WITHOUT GANGRENE: ICD-10-CM

## 2023-01-29 DIAGNOSIS — Z90.722 ACQUIRED ABSENCE OF OVARIES, BILATERAL: Chronic | ICD-10-CM

## 2023-01-29 LAB
ALBUMIN SERPL ELPH-MCNC: 3.6 G/DL — SIGNIFICANT CHANGE UP (ref 3.3–5)
ALP SERPL-CCNC: 69 U/L — SIGNIFICANT CHANGE UP (ref 40–120)
ALT FLD-CCNC: 40 U/L — SIGNIFICANT CHANGE UP (ref 10–45)
ANION GAP SERPL CALC-SCNC: 10 MMOL/L — SIGNIFICANT CHANGE UP (ref 5–17)
ANION GAP SERPL CALC-SCNC: 10 MMOL/L — SIGNIFICANT CHANGE UP (ref 5–17)
ANISOCYTOSIS BLD QL: SLIGHT — SIGNIFICANT CHANGE UP
APTT BLD: 29.9 SEC — SIGNIFICANT CHANGE UP (ref 27.5–35.5)
AST SERPL-CCNC: 97 U/L — HIGH (ref 10–40)
BASE EXCESS BLDV CALC-SCNC: 5 MMOL/L — HIGH (ref -2–3)
BASOPHILS # BLD AUTO: 0 K/UL — SIGNIFICANT CHANGE UP (ref 0–0.2)
BASOPHILS NFR BLD AUTO: 0 % — SIGNIFICANT CHANGE UP (ref 0–2)
BILIRUB SERPL-MCNC: 0.6 MG/DL — SIGNIFICANT CHANGE UP (ref 0.2–1.2)
BLD GP AB SCN SERPL QL: NEGATIVE — SIGNIFICANT CHANGE UP
BUN SERPL-MCNC: 10 MG/DL — SIGNIFICANT CHANGE UP (ref 7–23)
BUN SERPL-MCNC: 9 MG/DL — SIGNIFICANT CHANGE UP (ref 7–23)
CA-I SERPL-SCNC: 1.19 MMOL/L — SIGNIFICANT CHANGE UP (ref 1.15–1.33)
CALCIUM SERPL-MCNC: 9.4 MG/DL — SIGNIFICANT CHANGE UP (ref 8.4–10.5)
CALCIUM SERPL-MCNC: 9.4 MG/DL — SIGNIFICANT CHANGE UP (ref 8.4–10.5)
CHLORIDE BLDV-SCNC: 105 MMOL/L — SIGNIFICANT CHANGE UP (ref 96–108)
CHLORIDE SERPL-SCNC: 103 MMOL/L — SIGNIFICANT CHANGE UP (ref 96–108)
CHLORIDE SERPL-SCNC: 104 MMOL/L — SIGNIFICANT CHANGE UP (ref 96–108)
CO2 BLDV-SCNC: 32 MMOL/L — HIGH (ref 22–26)
CO2 SERPL-SCNC: 25 MMOL/L — SIGNIFICANT CHANGE UP (ref 22–31)
CO2 SERPL-SCNC: 27 MMOL/L — SIGNIFICANT CHANGE UP (ref 22–31)
CREAT SERPL-MCNC: 0.4 MG/DL — LOW (ref 0.5–1.3)
CREAT SERPL-MCNC: 0.45 MG/DL — LOW (ref 0.5–1.3)
DACRYOCYTES BLD QL SMEAR: SLIGHT — SIGNIFICANT CHANGE UP
EGFR: 92 ML/MIN/1.73M2 — SIGNIFICANT CHANGE UP
EGFR: 95 ML/MIN/1.73M2 — SIGNIFICANT CHANGE UP
ELLIPTOCYTES BLD QL SMEAR: SLIGHT — SIGNIFICANT CHANGE UP
EOSINOPHIL # BLD AUTO: 0 K/UL — SIGNIFICANT CHANGE UP (ref 0–0.5)
EOSINOPHIL NFR BLD AUTO: 0 % — SIGNIFICANT CHANGE UP (ref 0–6)
FLUAV AG NPH QL: SIGNIFICANT CHANGE UP
FLUBV AG NPH QL: SIGNIFICANT CHANGE UP
GAS PNL BLDV: 137 MMOL/L — SIGNIFICANT CHANGE UP (ref 136–145)
GAS PNL BLDV: SIGNIFICANT CHANGE UP
GAS PNL BLDV: SIGNIFICANT CHANGE UP
GIANT PLATELETS BLD QL SMEAR: PRESENT — SIGNIFICANT CHANGE UP
GLUCOSE BLDV-MCNC: 96 MG/DL — SIGNIFICANT CHANGE UP (ref 70–99)
GLUCOSE SERPL-MCNC: 85 MG/DL — SIGNIFICANT CHANGE UP (ref 70–99)
GLUCOSE SERPL-MCNC: 94 MG/DL — SIGNIFICANT CHANGE UP (ref 70–99)
HCO3 BLDV-SCNC: 30 MMOL/L — HIGH (ref 22–29)
HCT VFR BLD CALC: 32.7 % — LOW (ref 34.5–45)
HCT VFR BLDA CALC: 31 % — LOW (ref 34.5–46.5)
HGB BLD CALC-MCNC: 10.3 G/DL — LOW (ref 11.7–16.1)
HGB BLD-MCNC: 10 G/DL — LOW (ref 11.5–15.5)
HYPOCHROMIA BLD QL: SLIGHT — SIGNIFICANT CHANGE UP
INR BLD: 1.13 RATIO — SIGNIFICANT CHANGE UP (ref 0.88–1.16)
LACTATE BLDV-MCNC: 1.2 MMOL/L — SIGNIFICANT CHANGE UP (ref 0.5–2)
LYMPHOCYTES # BLD AUTO: 0.7 K/UL — LOW (ref 1–3.3)
LYMPHOCYTES # BLD AUTO: 10.4 % — LOW (ref 13–44)
MANUAL SMEAR VERIFICATION: SIGNIFICANT CHANGE UP
MCHC RBC-ENTMCNC: 18.2 PG — LOW (ref 27–34)
MCHC RBC-ENTMCNC: 30.6 GM/DL — LOW (ref 32–36)
MCV RBC AUTO: 59.7 FL — LOW (ref 80–100)
MICROCYTES BLD QL: SLIGHT — SIGNIFICANT CHANGE UP
MONOCYTES # BLD AUTO: 0.29 K/UL — SIGNIFICANT CHANGE UP (ref 0–0.9)
MONOCYTES NFR BLD AUTO: 4.4 % — SIGNIFICANT CHANGE UP (ref 2–14)
NEUTROPHILS # BLD AUTO: 5.71 K/UL — SIGNIFICANT CHANGE UP (ref 1.8–7.4)
NEUTROPHILS NFR BLD AUTO: 85.2 % — HIGH (ref 43–77)
PCO2 BLDV: 48 MMHG — HIGH (ref 39–42)
PH BLDV: 7.41 — SIGNIFICANT CHANGE UP (ref 7.32–7.43)
PLAT MORPH BLD: ABNORMAL
PLATELET # BLD AUTO: 220 K/UL — SIGNIFICANT CHANGE UP (ref 150–400)
PO2 BLDV: 41 MMHG — SIGNIFICANT CHANGE UP (ref 25–45)
POIKILOCYTOSIS BLD QL AUTO: SIGNIFICANT CHANGE UP
POLYCHROMASIA BLD QL SMEAR: SLIGHT — SIGNIFICANT CHANGE UP
POTASSIUM BLDV-SCNC: 5.6 MMOL/L — HIGH (ref 3.5–5.1)
POTASSIUM SERPL-MCNC: 3.4 MMOL/L — LOW (ref 3.5–5.3)
POTASSIUM SERPL-MCNC: SIGNIFICANT CHANGE UP (ref 3.5–5.3)
POTASSIUM SERPL-SCNC: 3.4 MMOL/L — LOW (ref 3.5–5.3)
POTASSIUM SERPL-SCNC: SIGNIFICANT CHANGE UP (ref 3.5–5.3)
PROT SERPL-MCNC: 7.5 G/DL — SIGNIFICANT CHANGE UP (ref 6–8.3)
PROTHROM AB SERPL-ACNC: 13.1 SEC — SIGNIFICANT CHANGE UP (ref 10.5–13.4)
RBC # BLD: 5.48 M/UL — HIGH (ref 3.8–5.2)
RBC # FLD: 18.6 % — HIGH (ref 10.3–14.5)
RBC BLD AUTO: ABNORMAL
RH IG SCN BLD-IMP: NEGATIVE — SIGNIFICANT CHANGE UP
RSV RNA NPH QL NAA+NON-PROBE: SIGNIFICANT CHANGE UP
SAO2 % BLDV: 64.9 % — LOW (ref 67–88)
SARS-COV-2 RNA SPEC QL NAA+PROBE: SIGNIFICANT CHANGE UP
SCHISTOCYTES BLD QL AUTO: SLIGHT — SIGNIFICANT CHANGE UP
SODIUM SERPL-SCNC: 139 MMOL/L — SIGNIFICANT CHANGE UP (ref 135–145)
SODIUM SERPL-SCNC: 140 MMOL/L — SIGNIFICANT CHANGE UP (ref 135–145)
TARGETS BLD QL SMEAR: SLIGHT — SIGNIFICANT CHANGE UP
WBC # BLD: 6.7 K/UL — SIGNIFICANT CHANGE UP (ref 3.8–10.5)
WBC # FLD AUTO: 6.7 K/UL — SIGNIFICANT CHANGE UP (ref 3.8–10.5)

## 2023-01-29 PROCEDURE — 99285 EMERGENCY DEPT VISIT HI MDM: CPT | Mod: GC

## 2023-01-29 PROCEDURE — 49507 PRP I/HERN INIT BLOCK >5 YR: CPT

## 2023-01-29 PROCEDURE — 74177 CT ABD & PELVIS W/CONTRAST: CPT | Mod: 26,MA

## 2023-01-29 PROCEDURE — 71045 X-RAY EXAM CHEST 1 VIEW: CPT | Mod: 26

## 2023-01-29 PROCEDURE — 99222 1ST HOSP IP/OBS MODERATE 55: CPT | Mod: 57

## 2023-01-29 DEVICE — MESH HERNIA PROLENE RECTANGLE 3 X 6": Type: IMPLANTABLE DEVICE | Status: FUNCTIONAL

## 2023-01-29 RX ORDER — MORPHINE SULFATE 50 MG/1
4 CAPSULE, EXTENDED RELEASE ORAL ONCE
Refills: 0 | Status: DISCONTINUED | OUTPATIENT
Start: 2023-01-29 | End: 2023-01-29

## 2023-01-29 RX ORDER — POTASSIUM CHLORIDE 20 MEQ
10 PACKET (EA) ORAL
Refills: 0 | Status: DISCONTINUED | OUTPATIENT
Start: 2023-01-29 | End: 2023-01-29

## 2023-01-29 RX ORDER — LEVOTHYROXINE SODIUM 125 MCG
50 TABLET ORAL DAILY
Refills: 0 | Status: DISCONTINUED | OUTPATIENT
Start: 2023-01-30 | End: 2023-02-01

## 2023-01-29 RX ORDER — FENTANYL CITRATE 50 UG/ML
25 INJECTION INTRAVENOUS
Refills: 0 | Status: DISCONTINUED | OUTPATIENT
Start: 2023-01-29 | End: 2023-01-29

## 2023-01-29 RX ORDER — ONDANSETRON 8 MG/1
4 TABLET, FILM COATED ORAL EVERY 6 HOURS
Refills: 0 | Status: DISCONTINUED | OUTPATIENT
Start: 2023-01-29 | End: 2023-02-01

## 2023-01-29 RX ORDER — METOPROLOL TARTRATE 50 MG
50 TABLET ORAL DAILY
Refills: 0 | Status: DISCONTINUED | OUTPATIENT
Start: 2023-01-30 | End: 2023-02-01

## 2023-01-29 RX ORDER — ACETAMINOPHEN 500 MG
750 TABLET ORAL ONCE
Refills: 0 | Status: DISCONTINUED | OUTPATIENT
Start: 2023-01-29 | End: 2023-02-01

## 2023-01-29 RX ORDER — IBUPROFEN 200 MG
400 TABLET ORAL EVERY 6 HOURS
Refills: 0 | Status: DISCONTINUED | OUTPATIENT
Start: 2023-01-29 | End: 2023-02-01

## 2023-01-29 RX ORDER — SODIUM CHLORIDE 9 MG/ML
1000 INJECTION, SOLUTION INTRAVENOUS
Refills: 0 | Status: DISCONTINUED | OUTPATIENT
Start: 2023-01-29 | End: 2023-01-31

## 2023-01-29 RX ORDER — LEVOTHYROXINE SODIUM 125 MCG
50 TABLET ORAL DAILY
Refills: 0 | Status: DISCONTINUED | OUTPATIENT
Start: 2023-01-29 | End: 2023-01-29

## 2023-01-29 RX ORDER — ONDANSETRON 8 MG/1
4 TABLET, FILM COATED ORAL ONCE
Refills: 0 | Status: DISCONTINUED | OUTPATIENT
Start: 2023-01-29 | End: 2023-01-29

## 2023-01-29 RX ORDER — SODIUM CHLORIDE 9 MG/ML
1000 INJECTION, SOLUTION INTRAVENOUS
Refills: 0 | Status: DISCONTINUED | OUTPATIENT
Start: 2023-01-29 | End: 2023-01-29

## 2023-01-29 RX ORDER — AMLODIPINE BESYLATE 2.5 MG/1
10 TABLET ORAL DAILY
Refills: 0 | Status: DISCONTINUED | OUTPATIENT
Start: 2023-01-30 | End: 2023-01-30

## 2023-01-29 RX ORDER — MORPHINE SULFATE 50 MG/1
2 CAPSULE, EXTENDED RELEASE ORAL EVERY 4 HOURS
Refills: 0 | Status: DISCONTINUED | OUTPATIENT
Start: 2023-01-29 | End: 2023-02-01

## 2023-01-29 RX ORDER — ASPIRIN/CALCIUM CARB/MAGNESIUM 324 MG
81 TABLET ORAL DAILY
Refills: 0 | Status: DISCONTINUED | OUTPATIENT
Start: 2023-01-29 | End: 2023-01-29

## 2023-01-29 RX ORDER — ENOXAPARIN SODIUM 100 MG/ML
40 INJECTION SUBCUTANEOUS EVERY 24 HOURS
Refills: 0 | Status: DISCONTINUED | OUTPATIENT
Start: 2023-01-29 | End: 2023-01-29

## 2023-01-29 RX ORDER — ACETAMINOPHEN 500 MG
975 TABLET ORAL EVERY 6 HOURS
Refills: 0 | Status: DISCONTINUED | OUTPATIENT
Start: 2023-01-30 | End: 2023-02-01

## 2023-01-29 RX ORDER — HYDROMORPHONE HYDROCHLORIDE 2 MG/ML
0.25 INJECTION INTRAMUSCULAR; INTRAVENOUS; SUBCUTANEOUS
Refills: 0 | Status: DISCONTINUED | OUTPATIENT
Start: 2023-01-29 | End: 2023-01-29

## 2023-01-29 RX ORDER — ENOXAPARIN SODIUM 100 MG/ML
40 INJECTION SUBCUTANEOUS EVERY 24 HOURS
Refills: 0 | Status: DISCONTINUED | OUTPATIENT
Start: 2023-01-29 | End: 2023-02-01

## 2023-01-29 RX ORDER — LOSARTAN POTASSIUM 100 MG/1
100 TABLET, FILM COATED ORAL DAILY
Refills: 0 | Status: DISCONTINUED | OUTPATIENT
Start: 2023-01-30 | End: 2023-01-30

## 2023-01-29 RX ORDER — AMLODIPINE BESYLATE 2.5 MG/1
10 TABLET ORAL EVERY 24 HOURS
Refills: 0 | Status: DISCONTINUED | OUTPATIENT
Start: 2023-01-29 | End: 2023-01-29

## 2023-01-29 RX ORDER — ATORVASTATIN CALCIUM 80 MG/1
20 TABLET, FILM COATED ORAL AT BEDTIME
Refills: 0 | Status: DISCONTINUED | OUTPATIENT
Start: 2023-01-29 | End: 2023-02-01

## 2023-01-29 RX ADMIN — ENOXAPARIN SODIUM 40 MILLIGRAM(S): 100 INJECTION SUBCUTANEOUS at 14:54

## 2023-01-29 RX ADMIN — MORPHINE SULFATE 4 MILLIGRAM(S): 50 CAPSULE, EXTENDED RELEASE ORAL at 11:07

## 2023-01-29 RX ADMIN — Medication 400 MILLIGRAM(S): at 21:23

## 2023-01-29 RX ADMIN — Medication 100 MILLIEQUIVALENT(S): at 14:53

## 2023-01-29 RX ADMIN — MORPHINE SULFATE 4 MILLIGRAM(S): 50 CAPSULE, EXTENDED RELEASE ORAL at 10:37

## 2023-01-29 RX ADMIN — SODIUM CHLORIDE 50 MILLILITER(S): 9 INJECTION, SOLUTION INTRAVENOUS at 20:32

## 2023-01-29 RX ADMIN — MORPHINE SULFATE 4 MILLIGRAM(S): 50 CAPSULE, EXTENDED RELEASE ORAL at 13:00

## 2023-01-29 RX ADMIN — MORPHINE SULFATE 4 MILLIGRAM(S): 50 CAPSULE, EXTENDED RELEASE ORAL at 12:29

## 2023-01-29 RX ADMIN — ATORVASTATIN CALCIUM 20 MILLIGRAM(S): 80 TABLET, FILM COATED ORAL at 21:23

## 2023-01-29 RX ADMIN — SODIUM CHLORIDE 100 MILLILITER(S): 9 INJECTION, SOLUTION INTRAVENOUS at 14:54

## 2023-01-29 NOTE — ED PROVIDER NOTE - CLINICAL SUMMARY MEDICAL DECISION MAKING FREE TEXT BOX
[place] - Patient is an 89-year-old female with a history of CHF, HTN, HLD, hypothyroidism, breast cancer, Raynaud's, asthma who presents to the ED with right lower quadrant abdominal pain. Concern for deric Chiquita - Patient is an 89-year-old female with a history of CHF, HTN, HLD, hypothyroidism, breast cancer, Raynaud's, asthma who presents to the ED with right lower quadrant abdominal pain. Concern for incarcerated hernia. Will attempt reduction again after pain meds. Will check labs, CT abd, consult surgery if unable to reduce Chiquita - Patient is an 89-year-old female with a history of CHF, HTN, HLD, hypothyroidism, breast cancer, Raynaud's, asthma who presents to the ED with right lower quadrant abdominal pain. Concern for incarcerated hernia. Will attempt reduction again after pain meds. Will check labs, CT abd, consult surgery if unable to reduce    89 F here w/ RLQ abd pain prior hx of oophectomy b/l here w/ abd pain started yesterday pt reports last BM was two days ago, no nausea no vomiting, last meal yesterday, pt w/ no fevers, no chills, has tenderenss in the RLQ will likely require sx consult and admission

## 2023-01-29 NOTE — ED ADULT NURSE NOTE - OBJECTIVE STATEMENT
Pt presented to ed with c/o intermittent RLQ pain that started last night. HX hernia. pt denies nausea, vomiting, fever, chills, chest pain, headache, dizziness.

## 2023-01-29 NOTE — ED PROVIDER NOTE - DIFFERENTIAL DIAGNOSIS
abd pain w/ hernia, possible mass, vs incarcerated vs less likely strangulated hernia, vs bowel obstruction Differential Diagnosis

## 2023-01-29 NOTE — CHART NOTE - NSCHARTNOTEFT_GEN_A_CORE
POST-OPERATIVE CHECK    Patient is s/p RLQ incarcerated hernia repair    Subjective:  Patient reports minimal pain at the incisional site, controlled with medication  Denies chest pain, shortness of breath, nausea, vomiting    PAST MEDICAL & SURGICAL HISTORY:  Benign essential hypertension  Anxiety  no med  Hypothyroid  Hyperlipidemia  Aortic stenosis  Central retinal vein occlusion of right eye  Breast cancer  right  breast cancer  1990  Asthma  no med  Uterine prolapse  H/O Raynaud&#x27;s syndrome  S/P lumpectomy of breast  right  S/P bilateral oophorectomy      Objective:  Vital Signs Last 24 Hrs  T(C): 36.7 (29 Jan 2023 21:15), Max: 36.7 (29 Jan 2023 10:09)  T(F): 98.1 (29 Jan 2023 21:15), Max: 98.1 (29 Jan 2023 20:00)  HR: 63 (29 Jan 2023 21:45) (63 - 95)  BP: 115/55 (29 Jan 2023 21:45) (108/54 - 185/62)  BP(mean): 79 (29 Jan 2023 21:45) (74 - 95)  RR: 14 (29 Jan 2023 21:45) (14 - 17)  SpO2: 100% (29 Jan 2023 21:45) (93% - 100%)    Parameters below as of 29 Jan 2023 21:45  Patient On (Oxygen Delivery Method): nasal cannula  O2 Flow (L/min): 2      PHYSICAL EXAM:  Constitutional: Well-developed, well-nourished, NAD.  Neuro: AOx3, no focal deficits  Respiratory: No respiratory distress  Cardiovascular: Warm and well perfused  Gastrointestinal: Abdomen soft, non distended, non tender, RLQ Incision C/D/I without any strikethrough      I&O's Detail    29 Jan 2023 07:01  -  29 Jan 2023 22:55  --------------------------------------------------------  IN:    Lactated Ringers: 150 mL    Oral Fluid: 75 mL  Total IN: 225 mL    OUT:    Indwelling Catheter - Urethral (mL): 190 mL  Total OUT: 190 mL    Total NET: 35 mL            LABS:                        10.0   6.70  )-----------( 220      ( 29 Jan 2023 10:49 )             32.7     01-29    140  |  103  |  9   ----------------------------<  85  3.4<L>   |  27  |  0.45<L>    Ca    9.4      29 Jan 2023 12:51    TPro  7.5  /  Alb  3.6  /  TBili  0.6  /  DBili  x   /  AST  97<H>  /  ALT  40  /  AlkPhos  69  01-29    PT/INR - ( 29 Jan 2023 10:49 )   PT: 13.1 sec;   INR: 1.13 ratio         PTT - ( 29 Jan 2023 10:49 )  PTT:29.9 sec    CAPILLARY BLOOD GLUCOSE      enoxaparin Injectable 40        Assessment:  The patient is a 89y Female who is now several hours post-op from a RLQ hernia repair    Plan:  - Pain control prn  - CLD w/ IVF @ 70  - AM Labs  - D/C Rosy AM  - DVT ppx  - OOB and ambulating as tolerated    ACS/Trauma Surgery  [id number]

## 2023-01-29 NOTE — H&P ADULT - HISTORY OF PRESENT ILLNESS
Patient is an 89-year-old female with a history of CHF, HTN, HLD, hypothyroidism, breast cancer, Raynaud's, asthma who presents to the ED with right lower quadrant abdominal pain.  She has had an abdominal hernia for a long time now, has never had any issues with that has not had it surgically repaired.  Last night, she started having intense right lower quadrant pain that would last a few seconds and then spontaneously resolved, a few episodes per hour.  Pain continued and progressively worsened today, so came to ED.  Last bowel movement was 2 days ago.  Denies any hematuria dysuria melena diarrhea.  No vaginal discharge.  Denies fevers, chills, nausea, vomiting, chest pain, shortness of breath.  Surgical history includes bilateral oophorectomy.    In the Ed, patient was afebrile, hemodynamically stable, and nontoxic appearing.

## 2023-01-29 NOTE — ED PROVIDER NOTE - PHYSICAL EXAMINATION
Edilma Porras MD  GENERAL: Patient awake alert +uncomfortable 2/2 pain  HEENT: NC/AT, Moist mucous membranes, EOMI.  LUNGS: CTAB, no wheezes or crackles.   CARDIAC: RRR, no m/r/g.    ABDOMEN: +tender RLQ, +RLQ hard mass/hernia palpated, unable to reduce, No CVA tenderness.   EXT: No edema. No calf tenderness.   MSK: No pain with movement, no deformities.  NEURO: A&Ox3. Moving all extremities.  SKIN: Warm and dry. No rash.  PSYCH: Normal affect.

## 2023-01-29 NOTE — ED PROVIDER NOTE - NSICDXPASTMEDICALHX_GEN_ALL_CORE_FT
PAST MEDICAL HISTORY:  Anxiety no med    Aortic stenosis     Asthma no med    Benign essential hypertension     Breast cancer right  breast cancer  1990    Central retinal vein occlusion of right eye     H/O Raynaud's syndrome     Hyperlipidemia     Hypothyroid     Uterine prolapse

## 2023-01-29 NOTE — H&P ADULT - NSHPPHYSICALEXAM_GEN_ALL_CORE
Gen: NAD, A&Ox3  Resp: airway intact, nonlabored   Abd: soft, focal tenderness over RLQ hernia, nonreducible, no skin changes  Vasc: WWP

## 2023-01-29 NOTE — ED PROVIDER NOTE - OTHER RECORDS SUMMARY FREE TEXT FOR MDM OBTAINED AND REVIEWED OLD RECORDS QUESTION
med hx obtained from prior charts- PMH of CHF, HTN, HLD, hypothyroidism, breast cancer, Raynaulds, asthma, pulmonary nodule,

## 2023-01-29 NOTE — BRIEF OPERATIVE NOTE - NSICDXBRIEFPROCEDURE_GEN_ALL_CORE_FT
PROCEDURES:  Repair, hernia, inguinal, open, using mesh, adult 30-Jan-2023 03:45:24  Chucky Bermudez

## 2023-01-29 NOTE — ED PROVIDER NOTE - OBJECTIVE STATEMENT
Patient is an 89-year-old female with a history of CHF, HTN, HLD, hypothyroidism, breast cancer, Raynaud's, asthma who presents to the ED with right lower quadrant abdominal pain.  She has had an abdominal hernia for a long time now, has never had any issues with that has not had it surgically repaired.  Last night, she started having intense right lower quadrant pain that would last a few seconds and then spontaneously resolved, a few episodes per hour.  Pain continued and progressively worsened today, so came to ED.  Last bowel movement was 2 days ago.  Denies any hematuria dysuria melena diarrhea.  No vaginal discharge.  Denies fevers, chills, nausea, vomiting, chest pain, shortness of breath.  Surgical history includes bilateral oophorectomy.

## 2023-01-29 NOTE — H&P ADULT - NSHPLABSRESULTS_GEN_ALL_CORE
10.0   6.70  )-----------( 220      ( 29 Jan 2023 10:49 )             32.7   < from: CT Abdomen and Pelvis w/ IV Cont (01.29.23 @ 12:12) >    FINDINGS:  LOWER CHEST: Mucous impacted airways of the right lower lobe. Additional   right basilar patchy opacification which may represent atelectasis or   infection. Nonspecific mosaic attenuation of the lung parenchyma could   reflect air trapping or component of pulmonary congestion. TAVR   prosthesis and leadless pacer noted of the heart. Dense mitral annular   calcification.    LIVER: Subcentimeter hepatic hypodensities, too small to characterize.  BILE DUCTS: No distention  GALLBLADDER: Phrygian cap at the gallbladder fundus.  SPLEEN: Spleen size within normal limits.  PANCREAS: No acute peripancreatic inflammation.  ADRENALS: Left adrenal thickening. Unremarkable right adrenal  KIDNEYS/URETERS: No hydronephrosis. Bilateral renal cysts. Additional   hypodensities too small to characterize.    BLADDER: Underdistended  REPRODUCTIVE ORGANS: Pessary device noted. Uterus and adnexa are   suboptimally characterized on CT. Suggestion of right adnexal   postsurgical changes. Correlate with procedural history.    BOWEL: Limited evaluation due to lack of oral contrast. Stomach is   underdistended. Overall, there is no small bowel distention. The terminal   ileum is fecalized. Right lower quadrant hernia is redemonstrated,   complex in appearance now containing portion of the terminal ileum as   well as the cecum with two points of transition, mesenteric vessels and   mild inflammation, representing a change since prior imaging. The TI in   the hernia sac is also facing outward, image 451 series 3. Findings are   concerning for bowel inflammation and/or ischemia related to the hernia.   Clinical correlation is recommended. The remainder of the colon   demonstrates mild stool burden as well as areas of under distention   distally. Colonic diverticulosis, without diverticulitis.  PERITONEUM: Trace fluid.  VESSELS: No abdominal aortic aneurysm.  RETROPERITONEUM/LYMPH NODES: Small volume nodes.  ABDOMINAL WALL: Right inguinal hernia containing bowel, two points of   transition, and mild inflammatory changes progressed since prior imaging   from 2021, further described above.  BONES: Osseous demineralization. Degenerative changes.    IMPRESSION:    Right lower quadrant hernia is redemonstrated, complex in appearance, now   containing portion of the terminal ileum as well as the cecum with two   points of transition, mesenteric vessels and mild inflammation,   representing a change since prior imaging. The TI in the hernia sac is   also facing outward, image 451 series 3. Findings are concerning for   bowel inflammation and/or ischemia related to the hernia. Clinical   correlation/ surgical evaluation is recommended.    Mucous impacted airwaysof the right lower lobe. Additional right basilar   patchy opacification which may represent atelectasis or infection.   Nonspecific mosaic attenuation of the lung parenchyma could reflect air   trapping or component of pulmonary congestion    Dr. Rausch discussed these findings with Dr. Porras from ER on   1/29/2023 at 12:40 PM, with read back.    --- End of Report ---           DWAIN JOHNSTON MD; Resident Radiologist  This document has been electronically signed.  MARLEN RAUSCH M.D., ATTENDINGRADIOLOGIST  This document has been electronically signed. Jan 29 2023 12:51PM    < end of copied text >

## 2023-01-29 NOTE — PATIENT PROFILE ADULT - FALL HARM RISK - HARM RISK INTERVENTIONS
Assistance with ambulation/Assistance OOB with selected safe patient handling equipment/Communicate Risk of Fall with Harm to all staff/Monitor gait and stability/Reinforce activity limits and safety measures with patient and family/Sit up slowly, dangle for a short time, stand at bedside before walking/Tailored Fall Risk Interventions/Use of alarms - bed, chair and/or voice tab/Visual Cue: Yellow wristband and red socks/Bed in lowest position, wheels locked, appropriate side rails in place/Call bell, personal items and telephone in reach/Instruct patient to call for assistance before getting out of bed or chair/Non-slip footwear when patient is out of bed/[place] to call system/Physically safe environment - no spills, clutter or unnecessary equipment/Purposeful Proactive Rounding/Room/bathroom lighting operational, light cord in reach Assistance with ambulation/Assistance OOB with selected safe patient handling equipment/Communicate Risk of Fall with Harm to all staff/Discuss with provider need for PT consult/Monitor gait and stability/Provide patient with walking aids - walker, cane, crutches/Reinforce activity limits and safety measures with patient and family/Sit up slowly, dangle for a short time, stand at bedside before walking/Tailored Fall Risk Interventions/Use of alarms - bed, chair and/or voice tab/Visual Cue: Yellow wristband and red socks/Bed in lowest position, wheels locked, appropriate side rails in place/Call bell, personal items and telephone in reach/Instruct patient to call for assistance before getting out of bed or chair/Non-slip footwear when patient is out of bed/[place] to call system/Physically safe environment - no spills, clutter or unnecessary equipment/Purposeful Proactive Rounding/Room/bathroom lighting operational, light cord in reach

## 2023-01-29 NOTE — BRIEF OPERATIVE NOTE - OPERATION/FINDINGS
- Attenuated right lower abdominal wall tissue with bulging and right inguinal hernia   - Hernia sac entered and cecum and terminal ileum appeared nondilated and viable  - Hernia sac closed and reduced  - Right inguinal hernia repaired with mesh

## 2023-01-29 NOTE — H&P ADULT - ASSESSMENT
89-year-old female with a history of CHF, HTN, HLD, hypothyroidism, breast cancer, Raynaud's, asthma and Surgical history of bilateral oophorectomy who presents to the ED with right lower quadrant abdominal pain. Physical Exam and Imaging concerning for RLQ incarcerated hernia.     Plan:  - Admit to [facility] under Dr. Figueroa  - NPO  - IVF  - serial abd exams  - no pain meds prior to exam  - add on for OR   - needs consent  - DVT ppx    ACS  [id number]

## 2023-01-29 NOTE — ED ADULT NURSE NOTE - NSIMPLEMENTINTERV_GEN_ALL_ED
Implemented All Fall Risk Interventions:  [place] to call system. Call bell, personal items and telephone within reach. Instruct patient to call for assistance. Room bathroom lighting operational. Non-slip footwear when patient is off stretcher. Physically safe environment: no spills, clutter or unnecessary equipment. Stretcher in lowest position, wheels locked, appropriate side rails in place. Provide visual cue, wrist band, yellow gown, etc. Monitor gait and stability. Monitor for mental status changes and reorient to person, place, and time. Review medications for side effects contributing to fall risk. Reinforce activity limits and safety measures with patient and family.

## 2023-01-29 NOTE — ED PROVIDER NOTE - PROGRESS NOTE DETAILS
surgery consulted hernia contains terminal ileum and cecum. inflammatory changes. 2 points of transition present  have repaged surgery hernia contains terminal ileum and cecum. inflammatory changes. 2 points of transition present  have repaged surgery -> updated surgery and they are aware. pending reccs

## 2023-01-29 NOTE — ED ADULT NURSE NOTE - NURSING MUSC ROM
Graft Donor Site Bandage (Optional-Leave Blank If You Don't Want In Note): Steri-strips and a pressure bandage were applied to the donor site. full range of motion in all extremities

## 2023-01-30 DIAGNOSIS — K46.0 UNSPECIFIED ABDOMINAL HERNIA WITH OBSTRUCTION, WITHOUT GANGRENE: ICD-10-CM

## 2023-01-30 DIAGNOSIS — E78.5 HYPERLIPIDEMIA, UNSPECIFIED: ICD-10-CM

## 2023-01-30 DIAGNOSIS — E03.9 HYPOTHYROIDISM, UNSPECIFIED: ICD-10-CM

## 2023-01-30 DIAGNOSIS — I10 ESSENTIAL (PRIMARY) HYPERTENSION: ICD-10-CM

## 2023-01-30 DIAGNOSIS — Z86.79 PERSONAL HISTORY OF OTHER DISEASES OF THE CIRCULATORY SYSTEM: ICD-10-CM

## 2023-01-30 LAB
ANION GAP SERPL CALC-SCNC: 11 MMOL/L — SIGNIFICANT CHANGE UP (ref 5–17)
BUN SERPL-MCNC: 12 MG/DL — SIGNIFICANT CHANGE UP (ref 7–23)
CALCIUM SERPL-MCNC: 8.6 MG/DL — SIGNIFICANT CHANGE UP (ref 8.4–10.5)
CHLORIDE SERPL-SCNC: 104 MMOL/L — SIGNIFICANT CHANGE UP (ref 96–108)
CO2 SERPL-SCNC: 27 MMOL/L — SIGNIFICANT CHANGE UP (ref 22–31)
CREAT SERPL-MCNC: 0.61 MG/DL — SIGNIFICANT CHANGE UP (ref 0.5–1.3)
EGFR: 85 ML/MIN/1.73M2 — SIGNIFICANT CHANGE UP
GLUCOSE SERPL-MCNC: 82 MG/DL — SIGNIFICANT CHANGE UP (ref 70–99)
HCT VFR BLD CALC: 27.4 % — LOW (ref 34.5–45)
HGB BLD-MCNC: 8.5 G/DL — LOW (ref 11.5–15.5)
MAGNESIUM SERPL-MCNC: 1.9 MG/DL — SIGNIFICANT CHANGE UP (ref 1.6–2.6)
MCHC RBC-ENTMCNC: 18.5 PG — LOW (ref 27–34)
MCHC RBC-ENTMCNC: 31 GM/DL — LOW (ref 32–36)
MCV RBC AUTO: 59.6 FL — LOW (ref 80–100)
NRBC # BLD: 0 /100 WBCS — SIGNIFICANT CHANGE UP (ref 0–0)
PHOSPHATE SERPL-MCNC: 4.8 MG/DL — HIGH (ref 2.5–4.5)
PLATELET # BLD AUTO: 206 K/UL — SIGNIFICANT CHANGE UP (ref 150–400)
POTASSIUM SERPL-MCNC: 3.6 MMOL/L — SIGNIFICANT CHANGE UP (ref 3.5–5.3)
POTASSIUM SERPL-SCNC: 3.6 MMOL/L — SIGNIFICANT CHANGE UP (ref 3.5–5.3)
RBC # BLD: 4.6 M/UL — SIGNIFICANT CHANGE UP (ref 3.8–5.2)
RBC # FLD: 17.2 % — HIGH (ref 10.3–14.5)
SODIUM SERPL-SCNC: 142 MMOL/L — SIGNIFICANT CHANGE UP (ref 135–145)
WBC # BLD: 10.63 K/UL — HIGH (ref 3.8–10.5)
WBC # FLD AUTO: 10.63 K/UL — HIGH (ref 3.8–10.5)

## 2023-01-30 PROCEDURE — 99223 1ST HOSP IP/OBS HIGH 75: CPT

## 2023-01-30 RX ORDER — LOSARTAN POTASSIUM 100 MG/1
100 TABLET, FILM COATED ORAL DAILY
Refills: 0 | Status: DISCONTINUED | OUTPATIENT
Start: 2023-01-30 | End: 2023-02-01

## 2023-01-30 RX ORDER — AMLODIPINE BESYLATE 2.5 MG/1
10 TABLET ORAL DAILY
Refills: 0 | Status: DISCONTINUED | OUTPATIENT
Start: 2023-01-30 | End: 2023-02-01

## 2023-01-30 RX ADMIN — Medication 400 MILLIGRAM(S): at 06:25

## 2023-01-30 RX ADMIN — Medication 975 MILLIGRAM(S): at 11:45

## 2023-01-30 RX ADMIN — Medication 400 MILLIGRAM(S): at 22:08

## 2023-01-30 RX ADMIN — Medication 975 MILLIGRAM(S): at 12:45

## 2023-01-30 RX ADMIN — Medication 400 MILLIGRAM(S): at 22:38

## 2023-01-30 RX ADMIN — Medication 400 MILLIGRAM(S): at 12:46

## 2023-01-30 RX ADMIN — AMLODIPINE BESYLATE 10 MILLIGRAM(S): 2.5 TABLET ORAL at 05:25

## 2023-01-30 RX ADMIN — Medication 975 MILLIGRAM(S): at 17:24

## 2023-01-30 RX ADMIN — SODIUM CHLORIDE 50 MILLILITER(S): 9 INJECTION, SOLUTION INTRAVENOUS at 17:28

## 2023-01-30 RX ADMIN — Medication 400 MILLIGRAM(S): at 18:25

## 2023-01-30 RX ADMIN — Medication 50 MICROGRAM(S): at 05:25

## 2023-01-30 RX ADMIN — ENOXAPARIN SODIUM 40 MILLIGRAM(S): 100 INJECTION SUBCUTANEOUS at 11:45

## 2023-01-30 RX ADMIN — Medication 400 MILLIGRAM(S): at 17:25

## 2023-01-30 RX ADMIN — Medication 50 MILLIGRAM(S): at 05:24

## 2023-01-30 RX ADMIN — Medication 400 MILLIGRAM(S): at 11:46

## 2023-01-30 RX ADMIN — LOSARTAN POTASSIUM 100 MILLIGRAM(S): 100 TABLET, FILM COATED ORAL at 05:25

## 2023-01-30 RX ADMIN — ATORVASTATIN CALCIUM 20 MILLIGRAM(S): 80 TABLET, FILM COATED ORAL at 22:08

## 2023-01-30 RX ADMIN — Medication 975 MILLIGRAM(S): at 18:24

## 2023-01-30 RX ADMIN — Medication 400 MILLIGRAM(S): at 05:25

## 2023-01-30 NOTE — CONSULT NOTE ADULT - PROBLEM SELECTOR RECOMMENDATION 9
Found to have incarcerated hernia on imaging  - s/p hernia repair  - Diet advanced to clears  - Care as per surgery

## 2023-01-30 NOTE — CONSULT NOTE ADULT - PROBLEM SELECTOR RECOMMENDATION 3
Physical Exam-GYN


Vital Signs: 


                                   Vital Signs











Temperature  98 F   10/16/20 22:00


 


Pulse Rate  75   10/16/20 22:00


 


Respiratory Rate  18   10/16/20 22:00


 


Blood Pressure  125/83   10/16/20 22:00


 


O2 Sat by Pulse Oximetry (%)  98   10/16/20 22:00











Constitutional: Yes: Well Nourished, No Distress


Gastrointestinal: Yes: WNL, Soft


Extremities: Yes: WNL


Edema: No


Labs: 


                                    CBC, BMP





                                 10/16/20 07:09 





                                 10/16/20 07:09 











Discharge Summary


Problems reviewed: Yes


Reason For Visit: FIBROIDS


Leiomyomatous uterus


Procedures: Principal: Abdominal myomectomy


Hospital Course: 


Unremarkable


Condition: Stable





- Instructions


Diet, Activity, Other Instructions: 


Dr. Luz Rausch Gyn discharge instructions 





Physical activity





Resume your normal everyday activity as tolerated no heavy lifting or exercise 

until seen by your surgeon. You may walk unlimited yenni of and climb stairs. 

You may resume driving the car when you feel safe and comfortable behind the 

wheel. No sexual activity as instructed by Dr. Rausch.





Wound care


If you have a bandage, leave it on, and keep dry for 48-72 hours. After that 

time discard the outer bandage. If they are tapes on the skin under the out of 

bandage leave them in place. They will peel off in the next 7 to 10 days. Do Not

Peel them off. You may shower the day after surgery. If there are tapes present 

on the skin, you may shower over them.





Diet


There are no dietary restrictions. Eat healthy, high-fiber foods. Drink 6 to 8 

glasses of liquid each day. This will assist in keeping your bowels are regular.





Pain management


You may take Tylenol or acetaminophen or Ibuprofen (for example, Motrin, Advil 

etc.) from my pain prescription medication is ordered should be taken as 

prescribed for moderate to severe pain.





Call Dr. Rausch for any of the following:





Severe pain not relieved by medication


Fever of 101 or higher


Excessive bleeding or drainage on dressing


Inability to urinate








ISTOP: [id number]





Call the office at [phone] for an appointment in seven days.








Disposition: HOME





- Home Medications


Comprehensive Discharge Medication List: 


Ambulatory Orders





NK [No Known Home Medication]  10/14/20 c/w synthroid

## 2023-01-30 NOTE — CONSULT NOTE ADULT - ASSESSMENT
89-year-old female with a history HTN, HLD, hypothyroidism, breast cancer, AS s/p TAVR, s/p micra ppm, Raynaud's, asthma who presents to the ED with right lower quadrant abdominal pain found to have incarcerated hernia s/p RLQ hernia s/p hernia repair

## 2023-01-30 NOTE — CONSULT NOTE ADULT - SUBJECTIVE AND OBJECTIVE BOX
TRAUMA COMANAGEMENT MEDICINE ATTENDING INITIAL CONSULT NOTE    HPI:  Patient is an 89-year-old female with a history HTN, HLD, hypothyroidism, breast cancer, Raynaud's, asthma who presents to the ED with right lower quadrant abdominal pain.  She has had an abdominal hernia for a long time now, has never had any issues with that has not had it surgically repaired.  Last night, she started having intense right lower quadrant pain that would last a few seconds and then spontaneously resolved, a few episodes per hour.  Pain continued and progressively worsened today, so came to ED.  Last bowel movement was 2 days ago.  Denies any hematuria dysuria melena diarrhea.  No vaginal discharge.  Denies fevers, chills, nausea, vomiting, chest pain, shortness of breath.  Surgical history includes bilateral oophorectomy.    In the Ed, patient was afebrile, hemodynamically stable, and nontoxic appearing.  (29 Jan 2023 14:11)      SUBJECTIVE: Seen at bedside, sitting in chair, comfortable    Home Medications:  acetaminophen 325 mg oral tablet: 2 tab(s) orally every 6 hours, As needed, Mild Pain (1 - 3) (19 Apr 2022 10:36)  amLODIPine 10 mg oral tablet: 1 tab(s) orally once a day (18 Apr 2022 06:38)  aspirin 81 mg oral delayed release tablet: 1 tab(s) orally once a day (at bedtime) (18 Apr 2022 06:38)  levothyroxine 50 mcg (0.05 mg) oral tablet: 1 tab(s) orally once a day (18 Apr 2022 06:38)  losartan 100 mg oral tablet: 1 tab(s) orally once a day (18 Apr 2022 06:38)  lovastatin 20 mg oral tablet: 1 tab(s) orally once a day (18 Apr 2022 06:38)  methenamine hippurate 1 g oral tablet: 1 tab(s) orally 2 times a day (18 Apr 2022 06:38)  Toprol-XL 50 mg oral tablet, extended release: 1 tab(s) orally once a day (18 Apr 2022 06:38)      PAST MEDICAL & SURGICAL HISTORY:  Benign essential hypertension      Anxiety  no med      Hypothyroid      Hyperlipidemia      Aortic stenosis      Central retinal vein occlusion of right eye      Breast cancer  right  breast cancer  1990      Asthma  no med      Uterine prolapse      H/O Raynaud&#x27;s syndrome      S/P lumpectomy of breast  right      S/P bilateral oophorectomy          Review of Systems:   CONSTITUTIONAL: No fever, weight loss, or fatigue  EYES: No eye pain, visual disturbances, or discharge  ENMT:  No difficulty hearing, tinnitus, vertigo; No sinus or throat pain  NECK: No pain or stiffness  RESPIRATORY: No cough, wheezing, chills or hemoptysis; No shortness of breath  CARDIOVASCULAR: No chest pain, palpitations, dizziness, or leg swelling  GASTROINTESTINAL: No abdominal or epigastric pain. No nausea, vomiting, or hematemesis; No diarrhea or constipation. No melena or hematochezia.  GENITOURINARY: No dysuria, frequency, hematuria, or incontinence  NEUROLOGICAL: No headaches, memory loss, loss of strength, numbness, or tremors  SKIN: No itching, burning, rashes, or lesions   LYMPH NODES: No enlarged glands  ENDOCRINE: No heat or cold intolerance; No hair loss  MUSCULOSKELETAL: No joint pain or swelling; No muscle, back, or extremity pain  PSYCHIATRIC: No depression, anxiety, mood swings, or difficulty sleeping      Allergies    levofloxacin (Other)  nitrofurantoin (Other)    Intolerances        Social History: Former smoker    FAMILY HISTORY:   Noncontributory    Vital Signs Last 24 Hrs  T(C): 36.7 (30 Jan 2023 09:10), Max: 36.7 (29 Jan 2023 15:05)  T(F): 98 (30 Jan 2023 09:10), Max: 98.1 (29 Jan 2023 20:00)  HR: 60 (30 Jan 2023 09:10) (60 - 85)  BP: 118/57 (30 Jan 2023 09:10) (107/51 - 185/62)  BP(mean): 79 (29 Jan 2023 21:45) (74 - 95)  RR: 18 (30 Jan 2023 09:10) (14 - 18)  SpO2: 95% (30 Jan 2023 09:10) (93% - 100%)    Parameters below as of 30 Jan 2023 09:10  Patient On (Oxygen Delivery Method): nasal cannula  O2 Flow (L/min): 2    CAPILLARY BLOOD GLUCOSE          PHYSICAL EXAM:  GENERAL: NAD, well-developed  HEAD:  NCAT  EYES: EOMI  NECK: Supple, No JVD  CHEST/LUNG: Clear to auscultation bilaterally; No wheeze  HEART: Reg rate. No M/R/G.  ABDOMEN: Soft, NT/ND, Bowel sounds present  EXTREMITIES:  2+ Peripheral Pulses, No clubbing, cyanosis, or edema  PSYCH: AAOx3  NEUROLOGY: non-focal    LABS:                        8.5    10.63 )-----------( 206      ( 30 Jan 2023 07:42 )             27.4     01-30    142  |  104  |  12  ----------------------------<  82  3.6   |  27  |  0.61    Ca    8.6      30 Jan 2023 07:42  Phos  4.8     01-30  Mg     1.9     01-30    TPro  7.5  /  Alb  3.6  /  TBili  0.6  /  DBili  x   /  AST  97<H>  /  ALT  40  /  AlkPhos  69  01-29    PT/INR - ( 29 Jan 2023 10:49 )   PT: 13.1 sec;   INR: 1.13 ratio         PTT - ( 29 Jan 2023 10:49 )  PTT:29.9 sec            MEDICATIONS  (STANDING):  acetaminophen     Tablet .. 975 milliGRAM(s) Oral every 6 hours  acetaminophen   IVPB .. 750 milliGRAM(s) IV Intermittent Once  amLODIPine   Tablet 10 milliGRAM(s) Oral daily  atorvastatin 20 milliGRAM(s) Oral at bedtime  enoxaparin Injectable 40 milliGRAM(s) SubCutaneous every 24 hours  ibuprofen  Tablet. 400 milliGRAM(s) Oral every 6 hours  lactated ringers. 1000 milliLiter(s) (50 mL/Hr) IV Continuous <Continuous>  levothyroxine 50 MICROGram(s) Oral daily  losartan 100 milliGRAM(s) Oral daily  metoprolol succinate ER 50 milliGRAM(s) Oral daily    MEDICATIONS  (PRN):  morphine  - Injectable 2 milliGRAM(s) IV Push every 4 hours PRN Severe Pain (7 - 10)  ondansetron Injectable 4 milliGRAM(s) IV Push every 6 hours PRN Nausea and/or Vomiting

## 2023-01-31 ENCOUNTER — TRANSCRIPTION ENCOUNTER (OUTPATIENT)
Age: 88
End: 2023-01-31

## 2023-01-31 DIAGNOSIS — I50.32 CHRONIC DIASTOLIC (CONGESTIVE) HEART FAILURE: ICD-10-CM

## 2023-01-31 LAB
ANION GAP SERPL CALC-SCNC: 11 MMOL/L — SIGNIFICANT CHANGE UP (ref 5–17)
APPEARANCE UR: CLEAR — SIGNIFICANT CHANGE UP
BACTERIA # UR AUTO: NEGATIVE — SIGNIFICANT CHANGE UP
BILIRUB UR-MCNC: NEGATIVE — SIGNIFICANT CHANGE UP
BUN SERPL-MCNC: 16 MG/DL — SIGNIFICANT CHANGE UP (ref 7–23)
CALCIUM SERPL-MCNC: 8.7 MG/DL — SIGNIFICANT CHANGE UP (ref 8.4–10.5)
CHLORIDE SERPL-SCNC: 105 MMOL/L — SIGNIFICANT CHANGE UP (ref 96–108)
CO2 SERPL-SCNC: 27 MMOL/L — SIGNIFICANT CHANGE UP (ref 22–31)
COLOR SPEC: COLORLESS — SIGNIFICANT CHANGE UP
CREAT SERPL-MCNC: 0.62 MG/DL — SIGNIFICANT CHANGE UP (ref 0.5–1.3)
DIFF PNL FLD: NEGATIVE — SIGNIFICANT CHANGE UP
EGFR: 85 ML/MIN/1.73M2 — SIGNIFICANT CHANGE UP
EPI CELLS # UR: 1 /HPF — SIGNIFICANT CHANGE UP
GLUCOSE SERPL-MCNC: 82 MG/DL — SIGNIFICANT CHANGE UP (ref 70–99)
GLUCOSE UR QL: NEGATIVE — SIGNIFICANT CHANGE UP
HCT VFR BLD CALC: 31.2 % — LOW (ref 34.5–45)
HGB BLD-MCNC: 9.6 G/DL — LOW (ref 11.5–15.5)
HYALINE CASTS # UR AUTO: 1 /LPF — SIGNIFICANT CHANGE UP (ref 0–2)
KETONES UR-MCNC: NEGATIVE — SIGNIFICANT CHANGE UP
LEUKOCYTE ESTERASE UR-ACNC: ABNORMAL
MAGNESIUM SERPL-MCNC: 2 MG/DL — SIGNIFICANT CHANGE UP (ref 1.6–2.6)
MCHC RBC-ENTMCNC: 18.3 PG — LOW (ref 27–34)
MCHC RBC-ENTMCNC: 30.8 GM/DL — LOW (ref 32–36)
MCV RBC AUTO: 59.5 FL — LOW (ref 80–100)
NITRITE UR-MCNC: NEGATIVE — SIGNIFICANT CHANGE UP
NRBC # BLD: 0 /100 WBCS — SIGNIFICANT CHANGE UP (ref 0–0)
PH UR: 6 — SIGNIFICANT CHANGE UP (ref 5–8)
PHOSPHATE SERPL-MCNC: 2.8 MG/DL — SIGNIFICANT CHANGE UP (ref 2.5–4.5)
PLATELET # BLD AUTO: 241 K/UL — SIGNIFICANT CHANGE UP (ref 150–400)
POTASSIUM SERPL-MCNC: 3.2 MMOL/L — LOW (ref 3.5–5.3)
POTASSIUM SERPL-SCNC: 3.2 MMOL/L — LOW (ref 3.5–5.3)
PROT UR-MCNC: NEGATIVE — SIGNIFICANT CHANGE UP
RBC # BLD: 5.24 M/UL — HIGH (ref 3.8–5.2)
RBC # FLD: 17.4 % — HIGH (ref 10.3–14.5)
RBC CASTS # UR COMP ASSIST: 1 /HPF — SIGNIFICANT CHANGE UP (ref 0–4)
SODIUM SERPL-SCNC: 143 MMOL/L — SIGNIFICANT CHANGE UP (ref 135–145)
SP GR SPEC: 1.01 — SIGNIFICANT CHANGE UP (ref 1.01–1.02)
UROBILINOGEN FLD QL: NEGATIVE — SIGNIFICANT CHANGE UP
WBC # BLD: 8.04 K/UL — SIGNIFICANT CHANGE UP (ref 3.8–10.5)
WBC # FLD AUTO: 8.04 K/UL — SIGNIFICANT CHANGE UP (ref 3.8–10.5)
WBC UR QL: 10 /HPF — HIGH (ref 0–5)

## 2023-01-31 PROCEDURE — 99232 SBSQ HOSP IP/OBS MODERATE 35: CPT

## 2023-01-31 PROCEDURE — 71045 X-RAY EXAM CHEST 1 VIEW: CPT | Mod: 26

## 2023-01-31 RX ORDER — POTASSIUM CHLORIDE 20 MEQ
40 PACKET (EA) ORAL EVERY 4 HOURS
Refills: 0 | Status: COMPLETED | OUTPATIENT
Start: 2023-01-31 | End: 2023-01-31

## 2023-01-31 RX ORDER — LANOLIN ALCOHOL/MO/W.PET/CERES
5 CREAM (GRAM) TOPICAL ONCE
Refills: 0 | Status: COMPLETED | OUTPATIENT
Start: 2023-01-31 | End: 2023-01-31

## 2023-01-31 RX ORDER — LANOLIN ALCOHOL/MO/W.PET/CERES
3 CREAM (GRAM) TOPICAL ONCE
Refills: 0 | Status: COMPLETED | OUTPATIENT
Start: 2023-01-31 | End: 2023-01-31

## 2023-01-31 RX ORDER — POTASSIUM CHLORIDE 20 MEQ
40 PACKET (EA) ORAL EVERY 4 HOURS
Refills: 0 | Status: DISCONTINUED | OUTPATIENT
Start: 2023-01-31 | End: 2023-01-31

## 2023-01-31 RX ADMIN — Medication 975 MILLIGRAM(S): at 11:49

## 2023-01-31 RX ADMIN — Medication 975 MILLIGRAM(S): at 12:49

## 2023-01-31 RX ADMIN — Medication 5 MILLIGRAM(S): at 21:11

## 2023-01-31 RX ADMIN — Medication 5 MILLIGRAM(S): at 23:23

## 2023-01-31 RX ADMIN — Medication 400 MILLIGRAM(S): at 04:47

## 2023-01-31 RX ADMIN — ATORVASTATIN CALCIUM 20 MILLIGRAM(S): 80 TABLET, FILM COATED ORAL at 21:10

## 2023-01-31 RX ADMIN — Medication 975 MILLIGRAM(S): at 19:00

## 2023-01-31 RX ADMIN — Medication 400 MILLIGRAM(S): at 05:17

## 2023-01-31 RX ADMIN — Medication 50 MICROGRAM(S): at 05:35

## 2023-01-31 RX ADMIN — Medication 400 MILLIGRAM(S): at 12:56

## 2023-01-31 RX ADMIN — Medication 50 MILLIGRAM(S): at 05:35

## 2023-01-31 RX ADMIN — Medication 40 MILLIEQUIVALENT(S): at 21:11

## 2023-01-31 RX ADMIN — Medication 400 MILLIGRAM(S): at 21:10

## 2023-01-31 RX ADMIN — ENOXAPARIN SODIUM 40 MILLIGRAM(S): 100 INJECTION SUBCUTANEOUS at 12:56

## 2023-01-31 RX ADMIN — Medication 975 MILLIGRAM(S): at 18:11

## 2023-01-31 RX ADMIN — LOSARTAN POTASSIUM 100 MILLIGRAM(S): 100 TABLET, FILM COATED ORAL at 05:35

## 2023-01-31 RX ADMIN — Medication 400 MILLIGRAM(S): at 16:31

## 2023-01-31 RX ADMIN — Medication 3 MILLIGRAM(S): at 02:29

## 2023-01-31 RX ADMIN — Medication 400 MILLIGRAM(S): at 13:56

## 2023-01-31 RX ADMIN — Medication 40 MILLIEQUIVALENT(S): at 18:11

## 2023-01-31 RX ADMIN — Medication 400 MILLIGRAM(S): at 17:31

## 2023-01-31 RX ADMIN — AMLODIPINE BESYLATE 10 MILLIGRAM(S): 2.5 TABLET ORAL at 05:36

## 2023-01-31 NOTE — PHYSICAL THERAPY INITIAL EVALUATION ADULT - PERTINENT HX OF CURRENT PROBLEM, REHAB EVAL
Patient is an 89-year-old female with a history of CHF, HTN, HLD, hypothyroidism, breast cancer, Raynaud's, asthma who presents to the ED with right lower quadrant abdominal pain.  She has had an abdominal hernia for a long time now, has never had any issues with that has not had it surgically repaired.  Last night, she started having intense right lower quadrant pain that would last a few seconds and then spontaneously resolved, a few episodes per hour.  Pain continued and progressively worsened today, so came to ED.  Last bowel movement was 2 days ago.  Denies any hematuria dysuria melena diarrhea.  No vaginal discharge.  Denies fevers, chills, nausea, vomiting, chest pain, shortness of breath.  Surgical history includes bilateral oophorectomy.EKG 1/29 + atrial-sensed ventricular-paced rhythm. Chest XR 1/29 (-). Ct abdomen/pelvis 1/29 R lower quadrant hernia is redemonstrated, complex in appearance, now containing portion of the terminal ileum as well as the cecum with two points of transition, mesenteric vessels and mild inflammation, representing a change since prior imaging. The TI in the hernia sac is also facing outward, image 451 series 3. Findings are concerning for bowel inflammation and/or ischemia related to the hernia. Clinical correlation/ surgical evaluation is recommended. Mucous impacted airways of the right lower lobe. Additional right basilar patchy opacification which may represent atelectasis or infection. Nonspecific mosaic attenuation of the lung parenchyma could reflect air   trapping or component of pulmonary congestion. Hernia repair 1/30, Repair, hernia, inguinal, open, using mesh

## 2023-01-31 NOTE — PHYSICAL THERAPY INITIAL EVALUATION ADULT - ADDITIONAL COMMENTS
Pt. currently lives alone in an apt. w/ an elevator and no steps to enter. Pt. states she is independent in all ADLS/IADLS using a RW. Pt. states she no longer drives. Pt. states she is now going to live closer to her son to help take care of her. Pt. states she is moving into an independent senior living a block away from her son.

## 2023-01-31 NOTE — PHYSICAL THERAPY INITIAL EVALUATION ADULT - BALANCE TRAINING, PT EVAL
GOAL: patient will be able to improve dynamic standing balance from fair(+) to good(-) with RW in 2 weeks

## 2023-01-31 NOTE — DISCHARGE NOTE PROVIDER - NSDCFUSCHEDAPPT_GEN_ALL_CORE_FT
[facility]  ELECTROPH [address]  Scheduled Appointment: 02/01/2023    [facility]  ELECTROPH [address]  Scheduled Appointment: 03/30/2023     [facility] Physician [facility]  ELECTROPH [address]  Scheduled Appointment: 03/30/2023

## 2023-01-31 NOTE — PROGRESS NOTE ADULT - PROBLEM SELECTOR PLAN 3
TTE from 6/2022 reviewed. Grade II diastolic disfunction. EF 68%. Does not appear to be in exacerbation at present.   - c/w Losartan and Metoprolol  - Not on a loop diuretic  - Monitor volume status  - Outpatient cardiology follow up

## 2023-01-31 NOTE — DISCHARGE NOTE PROVIDER - NSDCMRMEDTOKEN_GEN_ALL_CORE_FT
acetaminophen 325 mg oral tablet: 2 tab(s) orally every 6 hours, As needed, Mild Pain (1 - 3)  amLODIPine 10 mg oral tablet: 1 tab(s) orally once a day  aspirin 81 mg oral delayed release tablet: 1 tab(s) orally once a day (at bedtime)  levothyroxine 50 mcg (0.05 mg) oral tablet: 1 tab(s) orally once a day  losartan 100 mg oral tablet: 1 tab(s) orally once a day  lovastatin 20 mg oral tablet: 1 tab(s) orally once a day  methenamine hippurate 1 g oral tablet: 1 tab(s) orally 2 times a day  Toprol-XL 50 mg oral tablet, extended release: 1 tab(s) orally once a day   acetaminophen 325 mg oral tablet: 2 tab(s) orally every 6 hours, As needed, Mild Pain (1 - 3)  amLODIPine 10 mg oral tablet: 1 tab(s) orally once a day  aspirin 81 mg oral delayed release tablet: 1 tab(s) orally once a day (at bedtime)  atorvastatin 20 mg oral tablet: 1 tab(s) orally once a day (at bedtime)  ibuprofen 400 mg oral tablet: 1 tab(s) orally every 6 hours  levothyroxine 50 mcg (0.05 mg) oral tablet: 1 tab(s) orally once a day  losartan 100 mg oral tablet: 1 tab(s) orally once a day  methenamine hippurate 1 g oral tablet: 1 tab(s) orally 2 times a day  Toprol-XL 50 mg oral tablet, extended release: 1 tab(s) orally once a day

## 2023-01-31 NOTE — DISCHARGE NOTE PROVIDER - NSDCCPTREATMENT_GEN_ALL_CORE_FT
PRINCIPAL PROCEDURE  Procedure: Repair, hernia, inguinal, open, using mesh, adult  Findings and Treatment:

## 2023-01-31 NOTE — PROGRESS NOTE ADULT - PROBLEM SELECTOR PLAN 1
Found to have incarcerated hernia on imaging  - s/p hernia repair  - Diet advanced to regular  - Requiring O2 this am. CXR this am relatively unchanged, lung volumes appear slightly decreased from prior. Suspect likely atelectasis as etiology. IS while awake  - Care as per surgery.

## 2023-01-31 NOTE — DISCHARGE NOTE PROVIDER - HOSPITAL COURSE
HPI:  Patient is an 89-year-old female with a history of CHF, HTN, HLD, hypothyroidism, breast cancer, Raynaud's, asthma who presents to the ED with right lower quadrant abdominal pain.  She has had an abdominal hernia for a long time now, has never had any issues with that has not had it surgically repaired.  Last night, she started having intense right lower quadrant pain that would last a few seconds and then spontaneously resolved, a few episodes per hour.  Pain continued and progressively worsened today, so came to ED.  Last bowel movement was 2 days ago.  Denies any hematuria dysuria melena diarrhea.  No vaginal discharge.  Denies fevers, chills, nausea, vomiting, chest pain, shortness of breath.  Surgical history includes bilateral oophorectomy.    In the Ed, patient was afebrile, hemodynamically stable, and nontoxic appearing.  (29 Jan 2023 14:11)    Admitted to surgery, kept NPO and scheduled for surgery. Underwent right inguinal hernia repair with mesh without complication and was transferred to the PACU. When PACU criteria was met, patient transferred to the floor for observation. Postop, patient pain well controlled. Diet advanced as tolerated. Ambulation and incentive spirometry encouraged. Internal medicine consulted for medical comanagement. On POD1, Gallegos removed with successful trial of void. On POD2, patient desatted and CXR was ordered with minimal change and chest PT was administered. Oxygen monitored. Labs trended daily, lytes repleted as needed. Lovenox given for DVT ppx. PT recommended outpatient PT as needed. Cleared for discharge on POD___.    DISPO: F/U with surgeon as an outpatient.   HPI:  Patient is an 89-year-old female with a history of CHF, HTN, HLD, hypothyroidism, breast cancer, Raynaud's, asthma who presents to the ED with right lower quadrant abdominal pain.  She has had an abdominal hernia for a long time now, has never had any issues with that has not had it surgically repaired.  Last night, she started having intense right lower quadrant pain that would last a few seconds and then spontaneously resolved, a few episodes per hour.  Pain continued and progressively worsened today, so came to ED.  Last bowel movement was 2 days ago.  Denies any hematuria dysuria melena diarrhea.  No vaginal discharge.  Denies fevers, chills, nausea, vomiting, chest pain, shortness of breath.  Surgical history includes bilateral oophorectomy.    In the Ed, patient was afebrile, hemodynamically stable, and nontoxic appearing.  (29 Jan 2023 14:11)    Admitted to surgery, kept NPO and scheduled for surgery. Underwent right inguinal hernia repair with mesh without complication and was transferred to the PACU. When PACU criteria was met, patient transferred to the floor for observation. Postop, patient pain well controlled. Diet advanced as tolerated. Ambulation and incentive spirometry encouraged. Internal medicine consulted for medical comanagement. On POD1, Gallegos removed with successful trial of void. On POD2, patient desatted and CXR was ordered with minimal change and chest PT was administered. Oxygen monitored. Labs trended daily, lytes repleted as needed. Lovenox given for DVT ppx. PT recommended outpatient PT as needed. Cleared for discharge on POD3.     DISPO: F/U with surgeon as an outpatient.

## 2023-01-31 NOTE — PHYSICAL THERAPY INITIAL EVALUATION ADULT - GENERAL OBSERVATIONS, REHAB EVAL
Pt. semi-supine in bed +tele, +O2 monitor. Pt. semi-supine in bed +tele, +O2 monitor, +2L O2 via NC.

## 2023-01-31 NOTE — PROVIDER CONTACT NOTE (OTHER) - SITUATION
Provider contacted to report /66 and HR 70, patient asymptomatic, OOB in the chair.  Patient also requesting Melatonin for tonight

## 2023-01-31 NOTE — PHYSICAL THERAPY INITIAL EVALUATION ADULT - DISCHARGE PLANNER MADE AWARE
Performed soap suds enema. Pt put out ~2/3 enema liquid immediately. Pt then transferred to bedside commode, put out rest of enema liquid (light brown in color). Pt in bed w/ clean gown, sheets, and brief in place. [id number] Pt states she feels much calmer, that ativan helped ease her anxiety. yes

## 2023-01-31 NOTE — DISCHARGE NOTE PROVIDER - NSDCCPCAREPLAN_GEN_ALL_CORE_FT
PRINCIPAL DISCHARGE DIAGNOSIS  Diagnosis: Incarcerated hernia  Assessment and Plan of Treatment: WOUND CARE: Please remove any outer dressings in 24 hours. DO NOT remove steri-strips.  BATHING: Please do not submerge wound underwater. No swimming pools, no hot tubs, no tub baths. You may shower and/or sponge bathe in 24 hours. Let soapy water run over incisions. DO NOT scrub or soak incision sites. Pat dry.   ACTIVITY: No heavy lifting more than 10-15lbs or straining. Otherwise, you may return to your usual level of physical activity. You may complete your daily activities. Take frequent walks. If you are taking narcotic pain medication (such as Oxycodone or Percocet), do NOT drive a car, operate machinery or make important decisions.  DIET: Regular diet as tolerated.  NOTIFY YOUR SURGEON IF: You have any bleeding that does not stop, any pus draining from your wound, any fever (over 100.4 F) or chills, persistent nausea/vomiting with inability to tolerate food or liquids, persistent diarrhea, or if your pain is not controlled on your discharge pain medications.  FOLLOW-UP:  1. Please call to make a follow-up appointment with Dr. Figueroa or one of his partners within one week of discharge.  2. Please follow up with your primary care physician in one week regarding your hospitalization.

## 2023-01-31 NOTE — DISCHARGE NOTE PROVIDER - CARE PROVIDER_API CALL
Homero Coon)  Surgery; Surgical Critical Care  [address], [address]  [place] [address]  Phone: [phone]  Fax: [phone]  Follow Up Time: 1 week

## 2023-01-31 NOTE — PHYSICAL THERAPY INITIAL EVALUATION ADULT - ACTIVE RANGE OF MOTION EXAMINATION, REHAB EVAL
RUE AROM shoulder flexion ~120 degrees./Left LE Active ROM was WFL (within functional limits)/bilateral  lower extremity Active ROM was WFL (within functional limits)

## 2023-02-01 ENCOUNTER — TRANSCRIPTION ENCOUNTER (OUTPATIENT)
Age: 88
End: 2023-02-01

## 2023-02-01 ENCOUNTER — APPOINTMENT (OUTPATIENT)
Dept: ELECTROPHYSIOLOGY | Facility: CLINIC | Age: 88
End: 2023-02-01

## 2023-02-01 VITALS — RESPIRATION RATE: 18 BRPM | OXYGEN SATURATION: 96 %

## 2023-02-01 LAB
ANION GAP SERPL CALC-SCNC: 11 MMOL/L — SIGNIFICANT CHANGE UP (ref 5–17)
BUN SERPL-MCNC: 12 MG/DL — SIGNIFICANT CHANGE UP (ref 7–23)
CALCIUM SERPL-MCNC: 9.4 MG/DL — SIGNIFICANT CHANGE UP (ref 8.4–10.5)
CHLORIDE SERPL-SCNC: 105 MMOL/L — SIGNIFICANT CHANGE UP (ref 96–108)
CO2 SERPL-SCNC: 26 MMOL/L — SIGNIFICANT CHANGE UP (ref 22–31)
CREAT SERPL-MCNC: 0.5 MG/DL — SIGNIFICANT CHANGE UP (ref 0.5–1.3)
EGFR: 90 ML/MIN/1.73M2 — SIGNIFICANT CHANGE UP
GLUCOSE SERPL-MCNC: 84 MG/DL — SIGNIFICANT CHANGE UP (ref 70–99)
HCT VFR BLD CALC: 33.4 % — LOW (ref 34.5–45)
HGB BLD-MCNC: 10.2 G/DL — LOW (ref 11.5–15.5)
MAGNESIUM SERPL-MCNC: 1.8 MG/DL — SIGNIFICANT CHANGE UP (ref 1.6–2.6)
MCHC RBC-ENTMCNC: 18 PG — LOW (ref 27–34)
MCHC RBC-ENTMCNC: 30.5 GM/DL — LOW (ref 32–36)
MCV RBC AUTO: 59 FL — LOW (ref 80–100)
NRBC # BLD: 0 /100 WBCS — SIGNIFICANT CHANGE UP (ref 0–0)
PHOSPHATE SERPL-MCNC: 1.8 MG/DL — LOW (ref 2.5–4.5)
PLATELET # BLD AUTO: 260 K/UL — SIGNIFICANT CHANGE UP (ref 150–400)
POTASSIUM SERPL-MCNC: 4 MMOL/L — SIGNIFICANT CHANGE UP (ref 3.5–5.3)
POTASSIUM SERPL-SCNC: 4 MMOL/L — SIGNIFICANT CHANGE UP (ref 3.5–5.3)
RBC # BLD: 5.66 M/UL — HIGH (ref 3.8–5.2)
RBC # FLD: 17.9 % — HIGH (ref 10.3–14.5)
SODIUM SERPL-SCNC: 142 MMOL/L — SIGNIFICANT CHANGE UP (ref 135–145)
WBC # BLD: 8.52 K/UL — SIGNIFICANT CHANGE UP (ref 3.8–10.5)
WBC # FLD AUTO: 8.52 K/UL — SIGNIFICANT CHANGE UP (ref 3.8–10.5)

## 2023-02-01 PROCEDURE — 36415 COLL VENOUS BLD VENIPUNCTURE: CPT

## 2023-02-01 PROCEDURE — 83735 ASSAY OF MAGNESIUM: CPT

## 2023-02-01 PROCEDURE — 84295 ASSAY OF SERUM SODIUM: CPT

## 2023-02-01 PROCEDURE — 99285 EMERGENCY DEPT VISIT HI MDM: CPT

## 2023-02-01 PROCEDURE — 82565 ASSAY OF CREATININE: CPT

## 2023-02-01 PROCEDURE — 86900 BLOOD TYPING SEROLOGIC ABO: CPT

## 2023-02-01 PROCEDURE — 85025 COMPLETE CBC W/AUTO DIFF WBC: CPT

## 2023-02-01 PROCEDURE — 84100 ASSAY OF PHOSPHORUS: CPT

## 2023-02-01 PROCEDURE — 85610 PROTHROMBIN TIME: CPT

## 2023-02-01 PROCEDURE — 83605 ASSAY OF LACTIC ACID: CPT

## 2023-02-01 PROCEDURE — 86901 BLOOD TYPING SEROLOGIC RH(D): CPT

## 2023-02-01 PROCEDURE — 85730 THROMBOPLASTIN TIME PARTIAL: CPT

## 2023-02-01 PROCEDURE — 82330 ASSAY OF CALCIUM: CPT

## 2023-02-01 PROCEDURE — 85018 HEMOGLOBIN: CPT

## 2023-02-01 PROCEDURE — C9399: CPT

## 2023-02-01 PROCEDURE — 82947 ASSAY GLUCOSE BLOOD QUANT: CPT

## 2023-02-01 PROCEDURE — 84132 ASSAY OF SERUM POTASSIUM: CPT

## 2023-02-01 PROCEDURE — 82803 BLOOD GASES ANY COMBINATION: CPT

## 2023-02-01 PROCEDURE — 80053 COMPREHEN METABOLIC PANEL: CPT

## 2023-02-01 PROCEDURE — 93005 ELECTROCARDIOGRAM TRACING: CPT

## 2023-02-01 PROCEDURE — 86850 RBC ANTIBODY SCREEN: CPT

## 2023-02-01 PROCEDURE — 97162 PT EVAL MOD COMPLEX 30 MIN: CPT

## 2023-02-01 PROCEDURE — 85027 COMPLETE CBC AUTOMATED: CPT

## 2023-02-01 PROCEDURE — 81001 URINALYSIS AUTO W/SCOPE: CPT

## 2023-02-01 PROCEDURE — 87637 SARSCOV2&INF A&B&RSV AMP PRB: CPT

## 2023-02-01 PROCEDURE — 74177 CT ABD & PELVIS W/CONTRAST: CPT | Mod: MA

## 2023-02-01 PROCEDURE — 96374 THER/PROPH/DIAG INJ IV PUSH: CPT

## 2023-02-01 PROCEDURE — 85014 HEMATOCRIT: CPT

## 2023-02-01 PROCEDURE — 82435 ASSAY OF BLOOD CHLORIDE: CPT

## 2023-02-01 PROCEDURE — C1781: CPT

## 2023-02-01 PROCEDURE — 71045 X-RAY EXAM CHEST 1 VIEW: CPT

## 2023-02-01 PROCEDURE — 80048 BASIC METABOLIC PNL TOTAL CA: CPT

## 2023-02-01 RX ORDER — LOVASTATIN 20 MG
1 TABLET ORAL
Qty: 0 | Refills: 0 | DISCHARGE

## 2023-02-01 RX ORDER — ATORVASTATIN CALCIUM 80 MG/1
1 TABLET, FILM COATED ORAL
Qty: 0 | Refills: 0 | DISCHARGE
Start: 2023-02-01

## 2023-02-01 RX ORDER — IBUPROFEN 200 MG
1 TABLET ORAL
Qty: 0 | Refills: 0 | DISCHARGE
Start: 2023-02-01

## 2023-02-01 RX ADMIN — Medication 975 MILLIGRAM(S): at 06:49

## 2023-02-01 RX ADMIN — Medication 975 MILLIGRAM(S): at 05:49

## 2023-02-01 RX ADMIN — Medication 85 MILLIMOLE(S): at 12:12

## 2023-02-01 RX ADMIN — Medication 50 MILLIGRAM(S): at 05:46

## 2023-02-01 RX ADMIN — ENOXAPARIN SODIUM 40 MILLIGRAM(S): 100 INJECTION SUBCUTANEOUS at 12:18

## 2023-02-01 RX ADMIN — Medication 50 MICROGRAM(S): at 05:46

## 2023-02-01 RX ADMIN — AMLODIPINE BESYLATE 10 MILLIGRAM(S): 2.5 TABLET ORAL at 05:46

## 2023-02-01 RX ADMIN — Medication 975 MILLIGRAM(S): at 12:18

## 2023-02-01 RX ADMIN — LOSARTAN POTASSIUM 100 MILLIGRAM(S): 100 TABLET, FILM COATED ORAL at 05:46

## 2023-02-01 NOTE — PROGRESS NOTE ADULT - SUBJECTIVE AND OBJECTIVE BOX
Marco A Chu MD  Division of Hospital Medicine  Available via MS teams  If no response or off hours page: [phone]  ---------------------------------------------------------    AUDIE NOLASCO  89y  Female      Patient is a 89y old  Female who presents with a chief complaint of abdominal pain (2023 09:17)      INTERVAL HPI/OVERNIGHT EVENTS:  Seen at bedside. Back hurts from hospital bed. On O2 this am      REVIEW OF SYSTEMS: 10 point ROS negative unless listed above    T(C): 36.7 (23 @ 09:20), Max: 37 (23 @ 18:38)  HR: 72 (23 @ 09:20) (64 - 80)  BP: 160/70 (23 @ 09:20) (90/59 - 171/72)  RR: 18 (23 @ 09:20) (16 - 18)  SpO2: 93% (23 @ 09:20) (92% - 95%)  Wt(kg): --Vital Signs Last 24 Hrs  T(C): 36.7 (2023 09:20), Max: 37 (2023 18:38)  T(F): 98 (2023 09:20), Max: 98.6 (2023 18:38)  HR: 72 (2023 09:20) (64 - 80)  BP: 160/70 (2023 09:20) (90/59 - 171/72)  BP(mean): 68 (2023 15:42) (68 - 68)  RR: 18 (2023 09:20) (16 - 18)  SpO2: 93% (2023 09:20) (92% - 95%)    Parameters below as of 2023 09:20  Patient On (Oxygen Delivery Method): nasal cannula  O2 Flow (L/min): 2      PHYSICAL EXAM:  GENERAL: NAD, well-developed  NECK: Supple, No JVD  CHEST/LUNG: Clear to auscultation bilaterally; No wheeze  HEART: Reg rate. No M/R/G.  ABDOMEN: Soft, NT/ND, Bowel sounds present  EXTREMITIES:  2+ Peripheral Pulses, No clubbing, cyanosis, or edema  PSYCH: AAOx3  NEUROLOGY: non-focal    Consultant(s) Notes Reviewed:  [x ] YES  [ ] NO  Care Discussed with Consultants/Other Providers [ x] YES  [ ] NO    LABS:                        9.6    8.04  )-----------( 241      ( 2023 07:20 )             31.2         143  |  105  |  16  ----------------------------<  82  3.2<L>   |  27  |  0.62    Ca    8.7      2023 07:20  Phos  2.8       Mg     2.0             Urinalysis Basic - ( 2023 07:20 )    Color: Colorless / Appearance: Clear / S.010 / pH: x  Gluc: x / Ketone: Negative  / Bili: Negative / Urobili: Negative   Blood: x / Protein: Negative / Nitrite: Negative   Leuk Esterase: Large / RBC: 1 /hpf / WBC 10 /HPF   Sq Epi: x / Non Sq Epi: 1 /hpf / Bacteria: Negative      CAPILLARY BLOOD GLUCOSE            Urinalysis Basic - ( 2023 07:20 )    Color: Colorless / Appearance: Clear / S.010 / pH: x  Gluc: x / Ketone: Negative  / Bili: Negative / Urobili: Negative   Blood: x / Protein: Negative / Nitrite: Negative   Leuk Esterase: Large / RBC: 1 /hpf / WBC 10 /HPF   Sq Epi: x / Non Sq Epi: 1 /hpf / Bacteria: Negative        RADIOLOGY & ADDITIONAL TESTS:    Imaging Personally Reviewed:  [ ] YES  [ ] NO
TRAUMA SURGERY    Patient seen and examined on AM rounds. Feeling well without complaints. No bowel function yet. Gallegos in place.     PHYSICAL EXAM:  ABD: Abdomen soft, non distended, non tender, RLQ Incision C/D/I.
TRAUMA SURGERY    Patient seen and examined on AM rounds. Patient feeling well, reports lack of sleep. Eager to be discharged.     PHYSICAL EXAM:  ABD: Abdomen soft, non distended, non tender, RLQ dressing removed, steristrips in place - clean and dry. 
TRAUMA SURGERY    Patient seen and examined on AM rounds. Patient c/o back pain from bed and frequent urination.    PHYSICAL EXAM:  ABD: Abdomen soft, non distended, non tender, RLQ dressing clean, dry and intact.

## 2023-02-01 NOTE — DISCHARGE NOTE NURSING/CASE MANAGEMENT/SOCIAL WORK - NSDCPNINST_GEN_ALL_CORE
Call MD or go to ER if severe abdominal pain not relieved with pain meds, nausea, vomiting, fever, signs and symptoms of infections.

## 2023-02-01 NOTE — DISCHARGE NOTE NURSING/CASE MANAGEMENT/SOCIAL WORK - PATIENT PORTAL LINK FT
You can access the FollowMyHealth Patient Portal offered by [facility] by registering at the following website: [email or web address]. By joining [employer]’s FollowMyHealth portal, you will also be able to view your health information using other applications (apps) compatible with our system.

## 2023-02-01 NOTE — DISCHARGE NOTE NURSING/CASE MANAGEMENT/SOCIAL WORK - NSDCPEFALRISK_GEN_ALL_CORE
For information on Fall & Injury Prevention, visit: [email or web address] OR  [email or web address] OR  https://www.cdc.gov/steadi/patient.html

## 2023-02-01 NOTE — PROGRESS NOTE ADULT - ASSESSMENT
89y Female s/p RLQ hernia repair for incarcerated hernia     PLAN:  Pain control PRN  Clear liquid diet  D/C Gallegos, f/u TOV  OOB and ambulating as tolerated, PT eval  Dispo planning     ACS/Trauma Surgery  [id number]
89y Female s/p RLQ hernia repair for incarcerated hernia     PLAN:  Pain control PRN  Regular diet  Incentive spirometry   OOB and ambulating as tolerated  D/c to home later today with outpatient PT    ACS/Trauma Surgery  [phone]
89y Female s/p RLQ hernia repair for incarcerated hernia     PLAN:  Pain control PRN  Regular diet  Incentive spirometry   OOB and ambulating as tolerated  Check post void residual on bladder scan  PT dheerajal pending   Dispo planning for possible discharge later today     ACS/Trauma Surgery  [phone]
89-year-old female with a history HTN, HLD, hypothyroidism, breast cancer, AS s/p TAVR, s/p micra ppm, Raynaud's, asthma who presents to the ED with right lower quadrant abdominal pain found to have incarcerated hernia s/p RLQ hernia s/p hernia repair

## 2023-03-10 ENCOUNTER — RX RENEWAL (OUTPATIENT)
Age: 88
End: 2023-03-10

## 2023-03-13 ENCOUNTER — FORM ENCOUNTER (OUTPATIENT)
Age: 88
End: 2023-03-13

## 2023-03-30 ENCOUNTER — FORM ENCOUNTER (OUTPATIENT)
Age: 88
End: 2023-03-30

## 2023-03-30 ENCOUNTER — APPOINTMENT (OUTPATIENT)
Dept: ELECTROPHYSIOLOGY | Facility: CLINIC | Age: 88
End: 2023-03-30

## 2023-04-18 ENCOUNTER — RX RENEWAL (OUTPATIENT)
Age: 88
End: 2023-04-18

## 2023-05-01 ENCOUNTER — APPOINTMENT (OUTPATIENT)
Dept: ELECTROPHYSIOLOGY | Facility: CLINIC | Age: 88
End: 2023-05-01

## 2023-06-02 ENCOUNTER — APPOINTMENT (OUTPATIENT)
Dept: ELECTROPHYSIOLOGY | Facility: CLINIC | Age: 88
End: 2023-06-02

## 2023-06-07 NOTE — PRE-ANESTHESIA EVALUATION ADULT - NSANTHTOBACCOSD_GEN_ALL_CORE
06/07/23                            Tima Cuellar  [address] [address]    To Whom It May Concern:    This is to certify Tima Cuellar was evaluated with Ru Ozuna MD on 06/07/23 and can return to modified work on 6/8/23.     RESTRICTIONS: 5 pound lifting restriction left arm only.      Electronically signed by:  Ru Ozuna MD  [facility]-[address] [address]  Dept Phone: [phone]       
No

## 2023-06-08 ENCOUNTER — NON-APPOINTMENT (OUTPATIENT)
Age: 88
End: 2023-06-08

## 2023-06-30 ENCOUNTER — APPOINTMENT (OUTPATIENT)
Dept: ELECTROPHYSIOLOGY | Facility: CLINIC | Age: 88
End: 2023-06-30

## 2023-12-20 NOTE — ED PROVIDER NOTE - CCCP TRG CHIEF CMPLNT
Labor  Pregnancy normally lasts 39-41 weeks.  labor is when labor starts before you have been pregnant for 37 weeks. Babies who are born too early may have a higher risk for long-term problems like cerebral palsy or developmental delays. They may also have problems soon after birth, such as problems with blood sugar, body temperature, heart, and breathing. These problems may be very serious in babies who are born before 34 weeks of pregnancy.  What are the causes?  The cause of this condition is not known.  What increases the risk?  You are more likely to have  labor if:  You have medical problems, now or in the past.  You have problems now or in your past pregnancies.  You have lifestyle problems.  Medical history  You have problems of the womb (uterus).  You have an infection, including infections you get from sex.  You have problems that do not go away, such as:  Blood clots.  High blood pressure.  High blood sugar.  You have low body weight or too much body weight.  Present and past pregnancies  You have had  labor before.  You are pregnant with two babies or more.  You have a condition in which the placenta covers your cervix.  You waited less than 18 months between giving birth and becoming pregnant again.  Your unborn baby has some problems.  You have bleeding from your vagina.  You became pregnant by a method called IVF.  Lifestyle  You smoke.  You drink alcohol.  You use drugs.  You have stress.  You have abuse in your home.  You come in contact with chemicals that harm the body (pollutants).  Other factors  You are younger than 17 years or older than 35 years.  What are the signs or symptoms?  Symptoms of this condition include:  Cramps. The cramps may feel like cramps from a period. You may also have watery poop (diarrhea).  Pain in the belly (abdomen).  Pain in the lower back.  Regular contractions. It may feel like your belly is getting tighter.  Pressure in the lower  belly.  More fluid leaking from the vagina. The fluid may be watery or bloody.  Water breaking.  How is this treated?  Treatment for this condition depends on your health, the health of your baby, and how old your pregnancy is. It may include:  Taking medicines, such as:  Hormone medicines.  Medicines to stop contractions.  Medicines to help mature the baby's lungs.  Medicines to prevent your baby from getting cerebral palsy or other problems.  Bed rest. If the labor happens before 34 weeks of pregnancy, you may need to stay in the hospital.  Delivering the baby.  Follow these instructions at home:    Do not smoke or use any products that contain nicotine or tobacco. If you need help quitting, ask your doctor.  Do not drink alcohol.  Take over-the-counter and prescription medicines only as told by your doctor.  Rest as told by your doctor.  Return to your normal activities when your doctor says that it is safe.  Keep all follow-up visits.  How is this prevented?  To have a healthy pregnancy:  Do not use drugs.  Do not use any medicines unless you ask your doctor if they are safe for you.  Talk with your doctor before taking any herbal supplements.  Make sure you gain enough weight.  Watch for infection. If you think you might have an infection, get it checked right away. Symptoms of infection may include:  Fever.  Vaginal discharge that smells bad or is not normal.  Pain or burning when you pee.  Needing to pee urgently.  Needing to pee often.  Peeing small amounts often.  Blood in your pee.  Pee that smells bad or unusual.  Where to find more information  U.S. Department of Health and Human Services Office on Women's Health: www.womenshealth.gov  The American College of Obstetricians and Gynecologists: www.acog.org  Centers for Disease Control and Prevention: www.cdc.gov  Contact a doctor if:  You think you are going into  labor.  You have symptoms of  labor.  You have symptoms of infection.  Get help  right away if:  You are having painful contractions every 5 minutes or less.  Your water breaks.  Summary   labor is labor that starts before you reach 37 weeks of pregnancy.  Your baby may have problems if delivered early.  You are more likely to have  labor if you have certain medical problems or problems with a pregnancy now or in the past. Some lifestyle factors can also increase the risk.  Contact a doctor if you have symptoms of  labor.  This information is not intended to replace advice given to you by your health care provider. Make sure you discuss any questions you have with your health care provider.  Document Revised: 2021 Document Reviewed: 2021  Elsevier Patient Education ©  Elsevier Inc.     Nosebleed and R elbow pain s/p trip and fall in parking lot, denies LOC

## 2024-02-01 NOTE — DISCUSSION/SUMMARY
[FreeTextEntry1] : Could not perform in office UA due to recent dose of Uristat. Urine culture sent.  Will empirically treat with Bactrim DS.  After Bactrim DS she will start methenamine for UTI prevention.  Last cystoscopy was 2014.  Denies having upper tract imaging.  PT will return for cystoscopy and ok per Dr. Meadows to start with renal sonogram.  \par \par She is doing well with pessary. She will RTO in 3 months for f/u of prolapse or sooner if needed. \par \par Pt agrees to call office with any problems/concerns. 
absent

## 2024-10-31 NOTE — CONSULT NOTE ADULT - SUBJECTIVE AND OBJECTIVE BOX
88y Female RHD presents c/o R elbow pain sp mechanical fall today. Patient states that she tripped and fell hitting her nose on the ground. She is unsure if she landed on her elbow or fell onto her arm. Denies LOC. Denies numbness/tingling. Denies fever/chills. Denies pain/injury elsewhere. No other complaints. Patient was ambulatory after the fall. Patient has been ambulating to the bathroom in the ED as well without pain.      Allergies    No Known Allergies    Vital Signs Last 24 Hrs  T(C): 36.4 (10-29-21 @ 12:27), Max: 36.4 (10-29-21 @ 12:27)  T(F): 97.6 (10-29-21 @ 12:27), Max: 97.6 (10-29-21 @ 12:27)  HR: 75 (10-29-21 @ 12:27) (70 - 75)  BP: 166/75 (10-29-21 @ 12:27) (166/75 - 170/80)  BP(mean): --  RR: 20 (10-29-21 @ 12:27) (16 - 20)  SpO2: 95% (10-29-21 @ 12:27) (95% - 100%)    Imaging: XR R elbow demonstrates a displaced comminuted olecranon fracture    Physical Exam  Gen: NAD  R UE: Skin intact, small abrasion over the elbow,  +ttp elbow, +elbow effusion,   +r/u/m/ain/pin function, SILT, radial pulse intact,   compartments soft/compressible,  Able to extend elbow,   no DRUJ instability noted,   no bony ttp elsewhere    Procedure: Patient placed in well padded posterior slab. Post procedure exam unchanged, NV intact. Post procedure imaging obtained, adequate alignment.     A/P: 88y Female with R olecranon fracture  Pain control  NWB R UE in splint, keep c/d/I, sling for comfort  Ice/elevation   Active movement of fingers encouraged  Possible need for surgical intervention discussed with patient  All question answered  Follow up with  as outpatient early next week, call office for appointment   Ortho stable    31-Oct-2024 01-Nov-2024

## 2025-04-16 NOTE — PATIENT PROFILE ADULT - MEDICATIONS/VISITS
no
[facility]  [facility] [address]  Scheduled Appointment: 04/29/2025

## 2025-05-08 NOTE — PHYSICAL EXAM
No [No Deformities] : no deformities [Normal Conjunctiva] : the conjunctiva exhibited no abnormalities [Eyelids - No Xanthelasma] : the eyelids demonstrated no xanthelasmas [Normal Oral Mucosa] : normal oral mucosa [No Oral Pallor] : no oral pallor [No Oral Cyanosis] : no oral cyanosis [Normal Jugular Venous A Waves Present] : normal jugular venous A waves present [Normal Jugular Venous V Waves Present] : normal jugular venous V waves present [No Jugular Venous Romano A Waves] : no jugular venous romano A waves [Respiration, Rhythm And Depth] : normal respiratory rhythm and effort [Exaggerated Use Of Accessory Muscles For Inspiration] : no accessory muscle use [Auscultation Breath Sounds / Voice Sounds] : lungs were clear to auscultation bilaterally [Heart Rate And Rhythm] : heart rate and rhythm were normal [Heart Sounds] : normal S1 and S2 [Systolic grade ___/6] : A grade [unfilled]/6 systolic murmur was heard. [Abdomen Soft] : soft [Abdomen Tenderness] : non-tender [Abdomen Mass (___ Cm)] : no abdominal mass palpated [Abnormal Walk] : normal gait [Gait - Sufficient For Exercise Testing] : the gait was sufficient for exercise testing [Nail Clubbing] : no clubbing of the fingernails [Cyanosis, Localized] : no localized cyanosis [Petechial Hemorrhages (___cm)] : no petechial hemorrhages [Skin Color & Pigmentation] : normal skin color and pigmentation [] : no rash [No Venous Stasis] : no venous stasis [Skin Lesions] : no skin lesions [No Skin Ulcers] : no skin ulcer [No Xanthoma] : no  xanthoma was observed [Oriented To Time, Place, And Person] : oriented to person, place, and time [Affect] : the affect was normal [Mood] : the mood was normal [No Anxiety] : not feeling anxious

## (undated) DEVICE — DRAPE FEMORAL ANGIOGRAPHY W TROUGH

## (undated) DEVICE — SOL IRR POUR NS 0.9% 500ML

## (undated) DEVICE — BLADE SCALPEL SAFETYLOCK #15

## (undated) DEVICE — STAPLER SKIN VISI-STAT 35 WIDE

## (undated) DEVICE — GLV 7.5 PROTEXIS (WHITE)

## (undated) DEVICE — GLV 8 RADIATION

## (undated) DEVICE — SLV STER PROGRAM WAND

## (undated) DEVICE — PACK UNIVERSAL CARDIAC

## (undated) DEVICE — DRAIN CHANNEL 19FR ROUND FULL FLUTED

## (undated) DEVICE — SOL NORMOSOL-R PH7.4 1000ML

## (undated) DEVICE — NDL PERC BASEPLT 18GX7CM

## (undated) DEVICE — TUBING PRESSURE 100"

## (undated) DEVICE — FOLEY TRAY 16FR 5CC LF LUBRISIL ADVANCE TEMP CLOSED

## (undated) DEVICE — STEALTH CLAMP INSERT FIBRA/FIBRA 90MM

## (undated) DEVICE — PREP CHLORAPREP HI-LITE ORANGE 26ML

## (undated) DEVICE — SPONGE DISSECTOR PEANUT

## (undated) DEVICE — PACK CARDIAC YELLOW

## (undated) DEVICE — LIGASURE IMPACT

## (undated) DEVICE — POSITIONER FOAM EGG CRATE ULNAR 2PCS (PINK)

## (undated) DEVICE — DRSG OPSITE 13.75 X 4"

## (undated) DEVICE — DRAPE MAYO STAND 30"

## (undated) DEVICE — DRAPE LIGHT HANDLE COVER (BLUE)

## (undated) DEVICE — DRAPE TOWEL BLUE 17" X 24"

## (undated) DEVICE — DRAPE IOBAN 23" X 23"

## (undated) DEVICE — SUT BOOT STANDARD (ASSORTED) 5 PAIR

## (undated) DEVICE — SUCTION TUBE CARDIAC SOFT TIP 6FR SHAFT 10FR TIP 6"

## (undated) DEVICE — Device

## (undated) DEVICE — STEALTH CLAMP INSERT FIBRA/FIBRA 60MM

## (undated) DEVICE — WARMING BLANKET FULL UNDERBODY

## (undated) DEVICE — SUT SOFSILK 0 30" TIES

## (undated) DEVICE — VESSEL LOOP MAXI-RED  0.120" X 16"

## (undated) DEVICE — SUT PROLENE 5-0 36" RB-1

## (undated) DEVICE — SUT PDS II 1 48" TP-1

## (undated) DEVICE — SPECIMEN CONTAINER 100ML

## (undated) DEVICE — DRSG TEGADERM 6"X8"

## (undated) DEVICE — SUT PDS II 0 27" OS-6

## (undated) DEVICE — DRAPE 1/2 SHEET 40X57"

## (undated) DEVICE — WARMING BLANKET LOWER ADULT

## (undated) DEVICE — ELCTR HEX BLADE

## (undated) DEVICE — VENODYNE/SCD SLEEVE CALF LARGE

## (undated) DEVICE — DILATOR VESSEL 20FR

## (undated) DEVICE — DRSG MASTISOL

## (undated) DEVICE — SUT SOFSILK 3-0 18" V-20 (POP-OFF)

## (undated) DEVICE — FOLEY HOLDER STATLOCK 2 WAY ADULT

## (undated) DEVICE — GLV 8.5 PROTEXIS (WHITE)

## (undated) DEVICE — MARKING PEN W RULER

## (undated) DEVICE — GLV 6.5 PROTEXIS (WHITE)

## (undated) DEVICE — ELCTR BOVIE PENCIL HANDPIECE ROCKER SWITCH 15FT

## (undated) DEVICE — DRSG STERISTRIPS 0.5 X 4"

## (undated) DEVICE — DRAPE INSTRUMENT POUCH 6.75" X 11"

## (undated) DEVICE — ELCTR BOVIE PENCIL HANDPIECE

## (undated) DEVICE — GLV 7 PROTEXIS (WHITE)

## (undated) DEVICE — SUT SURGICAL STEEL 6 30" BP-1

## (undated) DEVICE — MEDICATION LABELS W MARKER

## (undated) DEVICE — SUT BIOSYN 4-0 18" P-12

## (undated) DEVICE — SUMP INTRACARDIAC 20FR 1/4" ADULT

## (undated) DEVICE — BLADE SCALPEL SAFETYLOCK #10

## (undated) DEVICE — SYR ASEPTO

## (undated) DEVICE — GLV 6 PROTEXIS W HYDROGEL

## (undated) DEVICE — WARMING BLANKET UPPER ADULT

## (undated) DEVICE — PACK MAJOR ABDOMINAL SUPINE

## (undated) DEVICE — STAPLER COVIDIEN ENDO GIA STANDARD HANDLE

## (undated) DEVICE — CONNECTOR STRAIGHT 3/8 X 3/8" W LUER LOCK

## (undated) DEVICE — SENSOR MYOCARDIAL TEMP 15MM

## (undated) DEVICE — VISITEC 4X4

## (undated) DEVICE — SUT PROLENE 3-0 36" SH

## (undated) DEVICE — GLV 8 PROTEXIS (WHITE)

## (undated) DEVICE — PACK UNIVERSAL CARDIAC SUPPLEMNTAL B

## (undated) DEVICE — PACING CABLE A/V TEMP SCREW DOWN 6FT

## (undated) DEVICE — SUCTION YANKAUER NO CONTROL VENT

## (undated) DEVICE — SUT BLUNT SZ 5

## (undated) DEVICE — SUT PROLENE 5-0 30" RB-2

## (undated) DEVICE — LAP PAD 18 X 18"

## (undated) DEVICE — MNFLD SETUP

## (undated) DEVICE — SUT PROLENE 4-0 36" SH

## (undated) DEVICE — SUT PROLENE 4-0 36" BB

## (undated) DEVICE — SUT POLYSORB 2 30" GS-26

## (undated) DEVICE — BULLDOG SPRING CLIP 6MM SOFT/SOFT

## (undated) DEVICE — SUT SOFSILK 2 60" TIES

## (undated) DEVICE — TUBING ATS SUCTION LINE

## (undated) DEVICE — SUT PLEDGET PRE PUNCH 4.8 X 9.5 X 1.5 MM

## (undated) DEVICE — SUT POLYSORB 2-0 30" GS-21 UNDYED

## (undated) DEVICE — CONNECTOR STRAIGHT 3/8 X 1/2"

## (undated) DEVICE — FOLEY TRAY 16FR 5CC LTX UMETER CLOSED

## (undated) DEVICE — TUBING SUCTION 20FT

## (undated) DEVICE — SUT SOFSILK 2-0 18" V-20 (POP-OFF)

## (undated) DEVICE — SOL IRR POUR H2O 250ML

## (undated) DEVICE — NDL COUNTER FOAM AND MAGNET 40-70

## (undated) DEVICE — GOWN TRIMAX LG

## (undated) DEVICE — BLADE SCALPEL SAFETYLOCK #11

## (undated) DEVICE — PACING CABLE TEMP MEDTRONIC WITH PAC-LOC

## (undated) DEVICE — WARMING BLANKET DUO-THERM HYPER/HYPOTHERM ADULT

## (undated) DEVICE — GOWN LG

## (undated) DEVICE — DRAPE 3/4 SHEET W REINFORCEMENT 56X77"

## (undated) DEVICE — TOURNIQUET SET 12FR (1 RED, 1 BLUE, 1 SNARE) 7"

## (undated) DEVICE — SUT PDS II 3-0 36" SH

## (undated) DEVICE — GOWN TRIMAX XXL

## (undated) DEVICE — DRSG OPSITE 2.5 X 2"

## (undated) DEVICE — PACK BASIN SPECIAL PROCEDURE

## (undated) DEVICE — VENTING ADAPTER "Y" (RED/BLUE) 7.5"

## (undated) DEVICE — DRAIN PLEUROVAC